# Patient Record
Sex: FEMALE | Race: WHITE | NOT HISPANIC OR LATINO | Employment: FULL TIME | URBAN - METROPOLITAN AREA
[De-identification: names, ages, dates, MRNs, and addresses within clinical notes are randomized per-mention and may not be internally consistent; named-entity substitution may affect disease eponyms.]

---

## 2021-04-15 ENCOUNTER — PATIENT MESSAGE (OUTPATIENT)
Dept: RESEARCH | Facility: HOSPITAL | Age: 41
End: 2021-04-15

## 2023-05-17 ENCOUNTER — HOSPITAL ENCOUNTER (INPATIENT)
Facility: HOSPITAL | Age: 43
LOS: 1 days | Discharge: HOME | DRG: 355 | End: 2023-05-19
Attending: EMERGENCY MEDICINE | Admitting: SURGERY
Payer: COMMERCIAL

## 2023-05-17 DIAGNOSIS — K45.8 INTERNAL HERNIA: Primary | ICD-10-CM

## 2023-05-17 PROCEDURE — 99285 PR EMERGENCY DEPT VISIT,LEVEL V: ICD-10-PCS | Mod: ,,, | Performed by: EMERGENCY MEDICINE

## 2023-05-17 PROCEDURE — 99285 EMERGENCY DEPT VISIT HI MDM: CPT | Mod: ,,, | Performed by: EMERGENCY MEDICINE

## 2023-05-17 PROCEDURE — 99285 EMERGENCY DEPT VISIT HI MDM: CPT

## 2023-05-18 ENCOUNTER — ANESTHESIA EVENT (OUTPATIENT)
Dept: SURGERY | Facility: HOSPITAL | Age: 43
DRG: 355 | End: 2023-05-18
Payer: COMMERCIAL

## 2023-05-18 ENCOUNTER — ANESTHESIA (OUTPATIENT)
Dept: SURGERY | Facility: HOSPITAL | Age: 43
DRG: 355 | End: 2023-05-18
Payer: COMMERCIAL

## 2023-05-18 LAB
ABO + RH BLD: NORMAL
ALBUMIN SERPL BCP-MCNC: 3.3 G/DL (ref 3.5–5.2)
ALBUMIN SERPL BCP-MCNC: 3.5 G/DL (ref 3.5–5.2)
ALP SERPL-CCNC: 45 U/L (ref 55–135)
ALP SERPL-CCNC: 49 U/L (ref 55–135)
ALT SERPL W/O P-5'-P-CCNC: 14 U/L (ref 10–44)
ALT SERPL W/O P-5'-P-CCNC: 15 U/L (ref 10–44)
ANION GAP SERPL CALC-SCNC: 11 MMOL/L (ref 8–16)
ANION GAP SERPL CALC-SCNC: 8 MMOL/L (ref 8–16)
AST SERPL-CCNC: 20 U/L (ref 10–40)
AST SERPL-CCNC: 21 U/L (ref 10–40)
B-HCG UR QL: NEGATIVE
BASOPHILS # BLD AUTO: 0.05 K/UL (ref 0–0.2)
BASOPHILS # BLD AUTO: 0.06 K/UL (ref 0–0.2)
BASOPHILS NFR BLD: 0.9 % (ref 0–1.9)
BASOPHILS NFR BLD: 1 % (ref 0–1.9)
BILIRUB SERPL-MCNC: 0.4 MG/DL (ref 0.1–1)
BILIRUB SERPL-MCNC: 0.5 MG/DL (ref 0.1–1)
BLD GP AB SCN CELLS X3 SERPL QL: NORMAL
BUN SERPL-MCNC: 14 MG/DL (ref 6–20)
BUN SERPL-MCNC: 14 MG/DL (ref 6–20)
CALCIUM SERPL-MCNC: 8.2 MG/DL (ref 8.7–10.5)
CALCIUM SERPL-MCNC: 8.2 MG/DL (ref 8.7–10.5)
CHLORIDE SERPL-SCNC: 107 MMOL/L (ref 95–110)
CHLORIDE SERPL-SCNC: 107 MMOL/L (ref 95–110)
CO2 SERPL-SCNC: 23 MMOL/L (ref 23–29)
CO2 SERPL-SCNC: 25 MMOL/L (ref 23–29)
CREAT SERPL-MCNC: 0.7 MG/DL (ref 0.5–1.4)
CREAT SERPL-MCNC: 0.7 MG/DL (ref 0.5–1.4)
CTP QC/QA: YES
DIFFERENTIAL METHOD: ABNORMAL
DIFFERENTIAL METHOD: ABNORMAL
EOSINOPHIL # BLD AUTO: 0.1 K/UL (ref 0–0.5)
EOSINOPHIL # BLD AUTO: 0.1 K/UL (ref 0–0.5)
EOSINOPHIL NFR BLD: 1 % (ref 0–8)
EOSINOPHIL NFR BLD: 2.2 % (ref 0–8)
ERYTHROCYTE [DISTWIDTH] IN BLOOD BY AUTOMATED COUNT: 18.6 % (ref 11.5–14.5)
ERYTHROCYTE [DISTWIDTH] IN BLOOD BY AUTOMATED COUNT: 18.6 % (ref 11.5–14.5)
EST. GFR  (NO RACE VARIABLE): >60 ML/MIN/1.73 M^2
EST. GFR  (NO RACE VARIABLE): >60 ML/MIN/1.73 M^2
GLUCOSE SERPL-MCNC: 83 MG/DL (ref 70–110)
GLUCOSE SERPL-MCNC: 89 MG/DL (ref 70–110)
HCT VFR BLD AUTO: 30.5 % (ref 37–48.5)
HCT VFR BLD AUTO: 30.7 % (ref 37–48.5)
HCV AB SERPL QL IA: NORMAL
HGB BLD-MCNC: 8.6 G/DL (ref 12–16)
HGB BLD-MCNC: 9 G/DL (ref 12–16)
HIV 1+2 AB+HIV1 P24 AG SERPL QL IA: NORMAL
IMM GRANULOCYTES # BLD AUTO: 0.01 K/UL (ref 0–0.04)
IMM GRANULOCYTES # BLD AUTO: 0.02 K/UL (ref 0–0.04)
IMM GRANULOCYTES NFR BLD AUTO: 0.2 % (ref 0–0.5)
IMM GRANULOCYTES NFR BLD AUTO: 0.3 % (ref 0–0.5)
LACTATE SERPL-SCNC: 0.6 MMOL/L (ref 0.5–2.2)
LYMPHOCYTES # BLD AUTO: 1.7 K/UL (ref 1–4.8)
LYMPHOCYTES # BLD AUTO: 1.8 K/UL (ref 1–4.8)
LYMPHOCYTES NFR BLD: 29 % (ref 18–48)
LYMPHOCYTES NFR BLD: 32.5 % (ref 18–48)
MAGNESIUM SERPL-MCNC: 2 MG/DL (ref 1.6–2.6)
MCH RBC QN AUTO: 21.6 PG (ref 27–31)
MCH RBC QN AUTO: 22.4 PG (ref 27–31)
MCHC RBC AUTO-ENTMCNC: 28.2 G/DL (ref 32–36)
MCHC RBC AUTO-ENTMCNC: 29.3 G/DL (ref 32–36)
MCV RBC AUTO: 76 FL (ref 82–98)
MCV RBC AUTO: 77 FL (ref 82–98)
MONOCYTES # BLD AUTO: 0.5 K/UL (ref 0.3–1)
MONOCYTES # BLD AUTO: 0.6 K/UL (ref 0.3–1)
MONOCYTES NFR BLD: 11.6 % (ref 4–15)
MONOCYTES NFR BLD: 8.6 % (ref 4–15)
NEUTROPHILS # BLD AUTO: 2.9 K/UL (ref 1.8–7.7)
NEUTROPHILS # BLD AUTO: 3.5 K/UL (ref 1.8–7.7)
NEUTROPHILS NFR BLD: 52.6 % (ref 38–73)
NEUTROPHILS NFR BLD: 60.1 % (ref 38–73)
NRBC BLD-RTO: 0 /100 WBC
NRBC BLD-RTO: 0 /100 WBC
PHOSPHATE SERPL-MCNC: 4.4 MG/DL (ref 2.7–4.5)
PLATELET # BLD AUTO: 321 K/UL (ref 150–450)
PLATELET # BLD AUTO: 322 K/UL (ref 150–450)
PMV BLD AUTO: 10.1 FL (ref 9.2–12.9)
PMV BLD AUTO: 9.6 FL (ref 9.2–12.9)
POTASSIUM SERPL-SCNC: 3.4 MMOL/L (ref 3.5–5.1)
POTASSIUM SERPL-SCNC: 3.7 MMOL/L (ref 3.5–5.1)
PROT SERPL-MCNC: 5.8 G/DL (ref 6–8.4)
PROT SERPL-MCNC: 6.1 G/DL (ref 6–8.4)
RBC # BLD AUTO: 3.98 M/UL (ref 4–5.4)
RBC # BLD AUTO: 4.02 M/UL (ref 4–5.4)
SODIUM SERPL-SCNC: 140 MMOL/L (ref 136–145)
SODIUM SERPL-SCNC: 141 MMOL/L (ref 136–145)
WBC # BLD AUTO: 5.53 K/UL (ref 3.9–12.7)
WBC # BLD AUTO: 5.9 K/UL (ref 3.9–12.7)

## 2023-05-18 PROCEDURE — 63600175 PHARM REV CODE 636 W HCPCS: Performed by: NURSE ANESTHETIST, CERTIFIED REGISTERED

## 2023-05-18 PROCEDURE — 63600175 PHARM REV CODE 636 W HCPCS

## 2023-05-18 PROCEDURE — D9220A PRA ANESTHESIA: ICD-10-PCS | Mod: CRNA,,, | Performed by: NURSE ANESTHETIST, CERTIFIED REGISTERED

## 2023-05-18 PROCEDURE — 63600175 PHARM REV CODE 636 W HCPCS: Performed by: STUDENT IN AN ORGANIZED HEALTH CARE EDUCATION/TRAINING PROGRAM

## 2023-05-18 PROCEDURE — 71000015 HC POSTOP RECOV 1ST HR: Performed by: SURGERY

## 2023-05-18 PROCEDURE — 71000033 HC RECOVERY, INTIAL HOUR: Performed by: SURGERY

## 2023-05-18 PROCEDURE — C9113 INJ PANTOPRAZOLE SODIUM, VIA: HCPCS | Performed by: STUDENT IN AN ORGANIZED HEALTH CARE EDUCATION/TRAINING PROGRAM

## 2023-05-18 PROCEDURE — 86900 BLOOD TYPING SEROLOGIC ABO: CPT | Performed by: ANESTHESIOLOGY

## 2023-05-18 PROCEDURE — D9220A PRA ANESTHESIA: ICD-10-PCS | Mod: ANES,,, | Performed by: ANESTHESIOLOGY

## 2023-05-18 PROCEDURE — 84100 ASSAY OF PHOSPHORUS: CPT | Performed by: STUDENT IN AN ORGANIZED HEALTH CARE EDUCATION/TRAINING PROGRAM

## 2023-05-18 PROCEDURE — 44238 UNLISTED LAPS PX INTESTINE: CPT | Mod: ,,, | Performed by: SURGERY

## 2023-05-18 PROCEDURE — 87389 HIV-1 AG W/HIV-1&-2 AB AG IA: CPT | Performed by: PHYSICIAN ASSISTANT

## 2023-05-18 PROCEDURE — 44238 PR LAPAROSCOPY; REDUCT/REPAIR  INTERNAL HERNIA/VOLVULUS/DEFECT: ICD-10-PCS | Mod: ,,, | Performed by: SURGERY

## 2023-05-18 PROCEDURE — 37000009 HC ANESTHESIA EA ADD 15 MINS: Performed by: SURGERY

## 2023-05-18 PROCEDURE — 83735 ASSAY OF MAGNESIUM: CPT | Performed by: STUDENT IN AN ORGANIZED HEALTH CARE EDUCATION/TRAINING PROGRAM

## 2023-05-18 PROCEDURE — 81025 URINE PREGNANCY TEST: CPT | Performed by: EMERGENCY MEDICINE

## 2023-05-18 PROCEDURE — 85025 COMPLETE CBC W/AUTO DIFF WBC: CPT | Performed by: STUDENT IN AN ORGANIZED HEALTH CARE EDUCATION/TRAINING PROGRAM

## 2023-05-18 PROCEDURE — 25000003 PHARM REV CODE 250: Performed by: STUDENT IN AN ORGANIZED HEALTH CARE EDUCATION/TRAINING PROGRAM

## 2023-05-18 PROCEDURE — D9220A PRA ANESTHESIA: Mod: CRNA,,, | Performed by: NURSE ANESTHETIST, CERTIFIED REGISTERED

## 2023-05-18 PROCEDURE — 71000016 HC POSTOP RECOV ADDL HR: Performed by: SURGERY

## 2023-05-18 PROCEDURE — 83605 ASSAY OF LACTIC ACID: CPT | Performed by: EMERGENCY MEDICINE

## 2023-05-18 PROCEDURE — 36000709 HC OR TIME LEV III EA ADD 15 MIN: Performed by: SURGERY

## 2023-05-18 PROCEDURE — 25000003 PHARM REV CODE 250: Performed by: SURGERY

## 2023-05-18 PROCEDURE — 36000708 HC OR TIME LEV III 1ST 15 MIN: Performed by: SURGERY

## 2023-05-18 PROCEDURE — 86803 HEPATITIS C AB TEST: CPT | Performed by: PHYSICIAN ASSISTANT

## 2023-05-18 PROCEDURE — 85025 COMPLETE CBC W/AUTO DIFF WBC: CPT | Mod: 91 | Performed by: EMERGENCY MEDICINE

## 2023-05-18 PROCEDURE — 25000003 PHARM REV CODE 250: Performed by: NURSE ANESTHETIST, CERTIFIED REGISTERED

## 2023-05-18 PROCEDURE — 63600175 PHARM REV CODE 636 W HCPCS: Performed by: ANESTHESIOLOGY

## 2023-05-18 PROCEDURE — 94761 N-INVAS EAR/PLS OXIMETRY MLT: CPT

## 2023-05-18 PROCEDURE — 80053 COMPREHEN METABOLIC PANEL: CPT | Mod: 91 | Performed by: EMERGENCY MEDICINE

## 2023-05-18 PROCEDURE — 27201423 OPTIME MED/SURG SUP & DEVICES STERILE SUPPLY: Performed by: SURGERY

## 2023-05-18 PROCEDURE — D9220A PRA ANESTHESIA: Mod: ANES,,, | Performed by: ANESTHESIOLOGY

## 2023-05-18 PROCEDURE — 37000008 HC ANESTHESIA 1ST 15 MINUTES: Performed by: SURGERY

## 2023-05-18 PROCEDURE — 80053 COMPREHEN METABOLIC PANEL: CPT | Performed by: STUDENT IN AN ORGANIZED HEALTH CARE EDUCATION/TRAINING PROGRAM

## 2023-05-18 PROCEDURE — 21400001 HC TELEMETRY ROOM

## 2023-05-18 RX ORDER — PANTOPRAZOLE SODIUM 40 MG/10ML
40 INJECTION, POWDER, LYOPHILIZED, FOR SOLUTION INTRAVENOUS DAILY
Status: DISCONTINUED | OUTPATIENT
Start: 2023-05-18 | End: 2023-05-19 | Stop reason: HOSPADM

## 2023-05-18 RX ORDER — ENOXAPARIN SODIUM 100 MG/ML
40 INJECTION SUBCUTANEOUS EVERY 24 HOURS
Status: DISCONTINUED | OUTPATIENT
Start: 2023-05-18 | End: 2023-05-19 | Stop reason: HOSPADM

## 2023-05-18 RX ORDER — SODIUM CHLORIDE, SODIUM LACTATE, POTASSIUM CHLORIDE, CALCIUM CHLORIDE 600; 310; 30; 20 MG/100ML; MG/100ML; MG/100ML; MG/100ML
INJECTION, SOLUTION INTRAVENOUS CONTINUOUS
Status: DISCONTINUED | OUTPATIENT
Start: 2023-05-18 | End: 2023-05-19

## 2023-05-18 RX ORDER — ACETAMINOPHEN 10 MG/ML
INJECTION, SOLUTION INTRAVENOUS
Status: DISCONTINUED | OUTPATIENT
Start: 2023-05-18 | End: 2023-05-18

## 2023-05-18 RX ORDER — OXYCODONE HYDROCHLORIDE 5 MG/1
5 TABLET ORAL EVERY 4 HOURS PRN
Status: DISCONTINUED | OUTPATIENT
Start: 2023-05-18 | End: 2023-05-19 | Stop reason: HOSPADM

## 2023-05-18 RX ORDER — HYDROMORPHONE HYDROCHLORIDE 1 MG/ML
0.2 INJECTION, SOLUTION INTRAMUSCULAR; INTRAVENOUS; SUBCUTANEOUS EVERY 5 MIN PRN
Status: DISCONTINUED | OUTPATIENT
Start: 2023-05-18 | End: 2023-05-18 | Stop reason: HOSPADM

## 2023-05-18 RX ORDER — ACETAMINOPHEN 10 MG/ML
1000 INJECTION, SOLUTION INTRAVENOUS EVERY 8 HOURS
Status: DISCONTINUED | OUTPATIENT
Start: 2023-05-18 | End: 2023-05-19 | Stop reason: HOSPADM

## 2023-05-18 RX ORDER — HYDROMORPHONE HYDROCHLORIDE 1 MG/ML
INJECTION, SOLUTION INTRAMUSCULAR; INTRAVENOUS; SUBCUTANEOUS
Status: COMPLETED
Start: 2023-05-18 | End: 2023-05-18

## 2023-05-18 RX ORDER — HYDROMORPHONE HYDROCHLORIDE 1 MG/ML
0.5 INJECTION, SOLUTION INTRAMUSCULAR; INTRAVENOUS; SUBCUTANEOUS EVERY 4 HOURS PRN
Status: DISCONTINUED | OUTPATIENT
Start: 2023-05-18 | End: 2023-05-18

## 2023-05-18 RX ORDER — LIDOCAINE HYDROCHLORIDE 10 MG/ML
1 INJECTION, SOLUTION EPIDURAL; INFILTRATION; INTRACAUDAL; PERINEURAL ONCE AS NEEDED
Status: DISCONTINUED | OUTPATIENT
Start: 2023-05-18 | End: 2023-05-19 | Stop reason: HOSPADM

## 2023-05-18 RX ORDER — SUCCINYLCHOLINE CHLORIDE 20 MG/ML
INJECTION INTRAMUSCULAR; INTRAVENOUS
Status: DISCONTINUED | OUTPATIENT
Start: 2023-05-18 | End: 2023-05-18

## 2023-05-18 RX ORDER — DEXAMETHASONE SODIUM PHOSPHATE 4 MG/ML
INJECTION, SOLUTION INTRA-ARTICULAR; INTRALESIONAL; INTRAMUSCULAR; INTRAVENOUS; SOFT TISSUE
Status: DISCONTINUED | OUTPATIENT
Start: 2023-05-18 | End: 2023-05-18

## 2023-05-18 RX ORDER — HALOPERIDOL 5 MG/ML
INJECTION INTRAMUSCULAR
Status: DISCONTINUED | OUTPATIENT
Start: 2023-05-18 | End: 2023-05-18

## 2023-05-18 RX ORDER — SODIUM CHLORIDE 0.9 % (FLUSH) 0.9 %
10 SYRINGE (ML) INJECTION
Status: DISCONTINUED | OUTPATIENT
Start: 2023-05-18 | End: 2023-05-18 | Stop reason: HOSPADM

## 2023-05-18 RX ORDER — TRAMADOL HYDROCHLORIDE 50 MG/1
50 TABLET ORAL EVERY 4 HOURS PRN
Status: DISCONTINUED | OUTPATIENT
Start: 2023-05-18 | End: 2023-05-19 | Stop reason: HOSPADM

## 2023-05-18 RX ORDER — PROPOFOL 10 MG/ML
VIAL (ML) INTRAVENOUS
Status: DISCONTINUED | OUTPATIENT
Start: 2023-05-18 | End: 2023-05-18

## 2023-05-18 RX ORDER — BUPIVACAINE HYDROCHLORIDE 2.5 MG/ML
INJECTION, SOLUTION EPIDURAL; INFILTRATION; INTRACAUDAL
Status: DISCONTINUED | OUTPATIENT
Start: 2023-05-18 | End: 2023-05-18 | Stop reason: HOSPADM

## 2023-05-18 RX ORDER — LIDOCAINE HYDROCHLORIDE 20 MG/ML
INJECTION, SOLUTION EPIDURAL; INFILTRATION; INTRACAUDAL; PERINEURAL
Status: DISCONTINUED | OUTPATIENT
Start: 2023-05-18 | End: 2023-05-18

## 2023-05-18 RX ORDER — DEXMEDETOMIDINE HYDROCHLORIDE 100 UG/ML
INJECTION, SOLUTION INTRAVENOUS
Status: DISCONTINUED | OUTPATIENT
Start: 2023-05-18 | End: 2023-05-18

## 2023-05-18 RX ORDER — ONDANSETRON 2 MG/ML
4 INJECTION INTRAMUSCULAR; INTRAVENOUS EVERY 12 HOURS PRN
Status: DISCONTINUED | OUTPATIENT
Start: 2023-05-18 | End: 2023-05-19 | Stop reason: HOSPADM

## 2023-05-18 RX ORDER — ONDANSETRON 2 MG/ML
INJECTION INTRAMUSCULAR; INTRAVENOUS
Status: DISCONTINUED | OUTPATIENT
Start: 2023-05-18 | End: 2023-05-18

## 2023-05-18 RX ORDER — ROCURONIUM BROMIDE 10 MG/ML
INJECTION, SOLUTION INTRAVENOUS
Status: DISCONTINUED | OUTPATIENT
Start: 2023-05-18 | End: 2023-05-18

## 2023-05-18 RX ORDER — QUETIAPINE FUMARATE 25 MG/1
50 TABLET, FILM COATED ORAL NIGHTLY PRN
COMMUNITY
End: 2023-10-03

## 2023-05-18 RX ORDER — FAMOTIDINE 10 MG/ML
INJECTION INTRAVENOUS
Status: DISCONTINUED | OUTPATIENT
Start: 2023-05-18 | End: 2023-05-18

## 2023-05-18 RX ORDER — CEFAZOLIN SODIUM 1 G/3ML
INJECTION, POWDER, FOR SOLUTION INTRAMUSCULAR; INTRAVENOUS
Status: DISCONTINUED | OUTPATIENT
Start: 2023-05-18 | End: 2023-05-18

## 2023-05-18 RX ORDER — MIDAZOLAM HYDROCHLORIDE 1 MG/ML
INJECTION, SOLUTION INTRAMUSCULAR; INTRAVENOUS
Status: DISCONTINUED | OUTPATIENT
Start: 2023-05-18 | End: 2023-05-18

## 2023-05-18 RX ORDER — QUETIAPINE FUMARATE 25 MG/1
50 TABLET, FILM COATED ORAL NIGHTLY PRN
Status: DISCONTINUED | OUTPATIENT
Start: 2023-05-18 | End: 2023-05-19 | Stop reason: HOSPADM

## 2023-05-18 RX ORDER — OXYCODONE HYDROCHLORIDE 10 MG/1
10 TABLET ORAL EVERY 4 HOURS PRN
Status: DISCONTINUED | OUTPATIENT
Start: 2023-05-18 | End: 2023-05-19 | Stop reason: HOSPADM

## 2023-05-18 RX ORDER — FENTANYL CITRATE 50 UG/ML
INJECTION, SOLUTION INTRAMUSCULAR; INTRAVENOUS
Status: DISCONTINUED | OUTPATIENT
Start: 2023-05-18 | End: 2023-05-18

## 2023-05-18 RX ORDER — HALOPERIDOL 5 MG/ML
0.5 INJECTION INTRAMUSCULAR EVERY 10 MIN PRN
Status: DISCONTINUED | OUTPATIENT
Start: 2023-05-18 | End: 2023-05-18 | Stop reason: HOSPADM

## 2023-05-18 RX ORDER — SODIUM CHLORIDE 0.9 % (FLUSH) 0.9 %
10 SYRINGE (ML) INJECTION
Status: DISCONTINUED | OUTPATIENT
Start: 2023-05-18 | End: 2023-05-19 | Stop reason: HOSPADM

## 2023-05-18 RX ORDER — ONDANSETRON 2 MG/ML
4 INJECTION INTRAMUSCULAR; INTRAVENOUS DAILY PRN
Status: DISCONTINUED | OUTPATIENT
Start: 2023-05-18 | End: 2023-05-18 | Stop reason: HOSPADM

## 2023-05-18 RX ORDER — SODIUM CHLORIDE, SODIUM LACTATE, POTASSIUM CHLORIDE, CALCIUM CHLORIDE 600; 310; 30; 20 MG/100ML; MG/100ML; MG/100ML; MG/100ML
INJECTION, SOLUTION INTRAVENOUS CONTINUOUS
Status: DISCONTINUED | OUTPATIENT
Start: 2023-05-18 | End: 2023-05-18

## 2023-05-18 RX ORDER — POLYETHYLENE GLYCOL 3350 17 G/17G
17 POWDER, FOR SOLUTION ORAL DAILY
Status: DISCONTINUED | OUTPATIENT
Start: 2023-05-18 | End: 2023-05-19 | Stop reason: HOSPADM

## 2023-05-18 RX ADMIN — FENTANYL CITRATE 50 MCG: 50 INJECTION, SOLUTION INTRAMUSCULAR; INTRAVENOUS at 11:05

## 2023-05-18 RX ADMIN — ACETAMINOPHEN 1000 MG: 10 INJECTION INTRAVENOUS at 09:05

## 2023-05-18 RX ADMIN — DEXMEDETOMIDINE 8 MCG: 100 INJECTION, SOLUTION, CONCENTRATE INTRAVENOUS at 11:05

## 2023-05-18 RX ADMIN — OXYCODONE HYDROCHLORIDE 10 MG: 10 TABLET ORAL at 09:05

## 2023-05-18 RX ADMIN — PROPOFOL 200 MG: 10 INJECTION, EMULSION INTRAVENOUS at 10:05

## 2023-05-18 RX ADMIN — HALOPERIDOL LACTATE 0.5 MG: 5 INJECTION, SOLUTION INTRAMUSCULAR at 11:05

## 2023-05-18 RX ADMIN — POLYETHYLENE GLYCOL 3350 17 G: 17 POWDER, FOR SOLUTION ORAL at 04:05

## 2023-05-18 RX ADMIN — LIDOCAINE HYDROCHLORIDE 100 MG: 20 INJECTION, SOLUTION EPIDURAL; INFILTRATION; INTRACAUDAL at 10:05

## 2023-05-18 RX ADMIN — ONDANSETRON 4 MG: 2 INJECTION INTRAMUSCULAR; INTRAVENOUS at 10:05

## 2023-05-18 RX ADMIN — ROCURONIUM BROMIDE 25 MG: 10 INJECTION, SOLUTION INTRAVENOUS at 10:05

## 2023-05-18 RX ADMIN — METHOCARBAMOL 500 MG: 100 INJECTION INTRAMUSCULAR; INTRAVENOUS at 10:05

## 2023-05-18 RX ADMIN — HYDROMORPHONE HYDROCHLORIDE 0.2 MG: 1 INJECTION, SOLUTION INTRAMUSCULAR; INTRAVENOUS; SUBCUTANEOUS at 12:05

## 2023-05-18 RX ADMIN — OXYCODONE 5 MG: 5 TABLET ORAL at 04:05

## 2023-05-18 RX ADMIN — CEFAZOLIN 2 G: 330 INJECTION, POWDER, FOR SOLUTION INTRAMUSCULAR; INTRAVENOUS at 10:05

## 2023-05-18 RX ADMIN — ROCURONIUM BROMIDE 50 MG: 10 INJECTION, SOLUTION INTRAVENOUS at 10:05

## 2023-05-18 RX ADMIN — PIPERACILLIN AND TAZOBACTAM 4.5 G: 4; .5 INJECTION, POWDER, LYOPHILIZED, FOR SOLUTION INTRAVENOUS; PARENTERAL at 04:05

## 2023-05-18 RX ADMIN — SODIUM CHLORIDE, POTASSIUM CHLORIDE, SODIUM LACTATE AND CALCIUM CHLORIDE: 600; 310; 30; 20 INJECTION, SOLUTION INTRAVENOUS at 12:05

## 2023-05-18 RX ADMIN — FENTANYL CITRATE 50 MCG: 50 INJECTION, SOLUTION INTRAMUSCULAR; INTRAVENOUS at 09:05

## 2023-05-18 RX ADMIN — SODIUM CHLORIDE, POTASSIUM CHLORIDE, SODIUM LACTATE AND CALCIUM CHLORIDE: 600; 310; 30; 20 INJECTION, SOLUTION INTRAVENOUS at 02:05

## 2023-05-18 RX ADMIN — FAMOTIDINE 20 MG: 10 INJECTION, SOLUTION INTRAVENOUS at 10:05

## 2023-05-18 RX ADMIN — FENTANYL CITRATE 50 MCG: 50 INJECTION, SOLUTION INTRAMUSCULAR; INTRAVENOUS at 10:05

## 2023-05-18 RX ADMIN — HYDROMORPHONE HYDROCHLORIDE 0.5 MG: 1 INJECTION, SOLUTION INTRAMUSCULAR; INTRAVENOUS; SUBCUTANEOUS at 04:05

## 2023-05-18 RX ADMIN — SODIUM CHLORIDE, SODIUM GLUCONATE, SODIUM ACETATE, POTASSIUM CHLORIDE, MAGNESIUM CHLORIDE, SODIUM PHOSPHATE, DIBASIC, AND POTASSIUM PHOSPHATE: .53; .5; .37; .037; .03; .012; .00082 INJECTION, SOLUTION INTRAVENOUS at 09:05

## 2023-05-18 RX ADMIN — ACETAMINOPHEN 1000 MG: 10 INJECTION INTRAVENOUS at 10:05

## 2023-05-18 RX ADMIN — SUCCINYLCHOLINE 120 MG: 20 INJECTION, SOLUTION INTRAMUSCULAR; INTRAVENOUS at 10:05

## 2023-05-18 RX ADMIN — PANTOPRAZOLE SODIUM 40 MG: 40 INJECTION, POWDER, FOR SOLUTION INTRAVENOUS at 04:05

## 2023-05-18 RX ADMIN — SUGAMMADEX 200 MG: 100 INJECTION, SOLUTION INTRAVENOUS at 11:05

## 2023-05-18 RX ADMIN — MIDAZOLAM HYDROCHLORIDE 2 MG: 1 INJECTION, SOLUTION INTRAMUSCULAR; INTRAVENOUS at 09:05

## 2023-05-18 RX ADMIN — DEXAMETHASONE SODIUM PHOSPHATE 8 MG: 4 INJECTION INTRA-ARTICULAR; INTRALESIONAL; INTRAMUSCULAR; INTRAVENOUS; SOFT TISSUE at 10:05

## 2023-05-18 RX ADMIN — ENOXAPARIN SODIUM 40 MG: 40 INJECTION SUBCUTANEOUS at 04:05

## 2023-05-18 NOTE — ANESTHESIA POSTPROCEDURE EVALUATION
Anesthesia Post Evaluation    Patient: Sarah Padron    Procedure(s) Performed: Procedure(s) (LRB):  LAPAROSCOPY, DIAGNOSTIC (N/A)    Final Anesthesia Type: general      Patient location during evaluation: PACU  Patient participation: Yes- Able to Participate  Level of consciousness: awake and alert  Post-procedure vital signs: reviewed and stable  Pain management: adequate  Airway patency: patent    PONV status at discharge: No PONV  Anesthetic complications: no      Cardiovascular status: blood pressure returned to baseline  Respiratory status: unassisted  Hydration status: euvolemic  Follow-up not needed.          Vitals Value Taken Time   BP 94/51 05/18/23 1317   Temp 36.6 °C (97.8 °F) 05/18/23 1300   Pulse 66 05/18/23 1320   Resp 13 05/18/23 1320   SpO2 100 % 05/18/23 1320   Vitals shown include unvalidated device data.      Event Time   Out of Recovery 05/18/2023 12:30:00         Pain/Wesley Score: Pain Rating Prior to Med Admin: 6 (5/18/2023 12:49 PM)  Pain Rating Post Med Admin: 9 (headache) (5/18/2023  9:22 AM)  Wesley Score: 10 (5/18/2023 12:30 PM)

## 2023-05-18 NOTE — ASSESSMENT & PLAN NOTE
43 y.o. female with PMHx of obesity s/p lap band in 2008, lap band removal with conversion to bypass in 2014, ex lap with reduction of internal hernia in 2016 who presents as transfer for evaluation of an internal hernia.     - Plan for OR today for diag lap, possible ex lap; consent obtained (in folder)  - lactate normal  - NPO  - mIVF  - Pain meds prn  - Antiemetics prn  - Daily labs  - Please call with questions or concerns

## 2023-05-18 NOTE — HPI
Sarah Padron is a 43 y.o. female with PMHx of obesity s/p lap band in 2008, lap band removal with conversion to bypass in 2014, ex lap with reduction of internal hernia in 2016 who presents as transfer for evaluation of an internal hernia. 4 days ago she developed epigastric pain after eating eggplant parmesan. This pain got progressively worse prompting her visit to an outside ED yesterday. She notes nausea and dry heaving, but no vomiting. Her last BM was yesterday, denies blood in stool, diarrhea, constipation. No fevers at home. She states her pain has now almost completely resolved after receiving a dose of Morphine.   She is hemodynamically stable with HR 70s to 80s, sBP 110s to 120s. WBC within normal limits, labs unremarkable overall. CT A/P with evidence of internal hernia.

## 2023-05-18 NOTE — ED PROVIDER NOTES
Encounter Date: 5/17/2023       History     Chief Complaint   Patient presents with    Transfer      transfer for twisted internal hernia. Here to see bariatrics      Patient is a 43-year-old female with past medical history of cholecystectomy, gastric lap band, hernia repair who presents from [facility] as a transfer for further evaluation and treatment of an internal hernia.  Patient had a CT scan at the outside hospital showing interval development of whirl sign at the root of the mesentery.  She reports her pain has improved since her arrival to  and is currently 2/10.     The history is provided by the patient and medical records.   Review of patient's allergies indicates:   Allergen Reactions    Dilaudid [hydromorphone] Itching     History reviewed. No pertinent past medical history.  Past Surgical History:   Procedure Laterality Date    ABDOMINAL SURGERY      BREAST SURGERY      lift    CHOLECYSTECTOMY      DIAGNOSTIC LAPAROSCOPY N/A 5/18/2023    Procedure: LAPAROSCOPY, DIAGNOSTIC;  Surgeon: Davy Barnes MD;  Location: [facility] OR [address] [place] [address];  Service: General;  Laterality: N/A;  REPAIR INTERNAL HERNIA    EYE SURGERY Right     cataract    GASTRIC BYPASS      HERNIA REPAIR N/A 2016    internal    lap band removal  7/9/14    LAPAROSCOPIC GASTRIC BANDING      LAPAROSCOPIC GASTRIC BANDING  2008    revision     Family History   Problem Relation Age of Onset    Hypertension Mother     Hypertension Father     Obesity Father      Social History     Tobacco Use    Smoking status: Never    Smokeless tobacco: Never   Substance Use Topics    Alcohol use: Yes     Comment: ocassional; wine    Drug use: No         Physical Exam     Initial Vitals [05/17/23 2313]   BP Pulse Resp Temp SpO2   122/68 96 16 98.2 °F (36.8 °C) 98 %      MAP       --         Physical Exam    Nursing note and vitals reviewed.  Constitutional: She appears well-developed and well-nourished. She is not diaphoretic. No distress.    HENT:   Head: Normocephalic and atraumatic.   Eyes: Conjunctivae and EOM are normal.   Neck: Neck supple.   Normal range of motion.  Cardiovascular:  Normal rate and regular rhythm.           Pulmonary/Chest: No respiratory distress.   Abdominal: She exhibits no distension. There is abdominal tenderness (mild to moderate). There is no rebound and no guarding.   Musculoskeletal:      Cervical back: Normal range of motion and neck supple.     Neurological: She is alert and oriented to person, place, and time. GCS score is 15. GCS eye subscore is 4. GCS verbal subscore is 5. GCS motor subscore is 6.   Skin: Skin is warm and dry.   Psychiatric: She has a normal mood and affect. Her behavior is normal. Judgment and thought content normal.       ED Course   Procedures  Labs Reviewed   COMPREHENSIVE METABOLIC PANEL - Abnormal; Notable for the following components:       Result Value    Potassium 3.4 (*)     Calcium 8.2 (*)     Alkaline Phosphatase 49 (*)     All other components within normal limits   CBC W/ AUTO DIFFERENTIAL - Abnormal; Notable for the following components:    Hemoglobin 9.0 (*)     Hematocrit 30.7 (*)     MCV 76 (*)     MCH 22.4 (*)     MCHC 29.3 (*)     RDW 18.6 (*)     All other components within normal limits   CBC W/ AUTO DIFFERENTIAL - Abnormal; Notable for the following components:    RBC 3.98 (*)     Hemoglobin 8.6 (*)     Hematocrit 30.5 (*)     MCV 77 (*)     MCH 21.6 (*)     MCHC 28.2 (*)     RDW 18.6 (*)     All other components within normal limits   COMPREHENSIVE METABOLIC PANEL - Abnormal; Notable for the following components:    Calcium 8.2 (*)     Total Protein 5.8 (*)     Albumin 3.3 (*)     Alkaline Phosphatase 45 (*)     All other components within normal limits   HIV 1 / 2 ANTIBODY    Narrative:     Release to patient->Immediate   HEPATITIS C ANTIBODY    Narrative:     Release to patient->Immediate   LACTIC ACID, PLASMA   MAGNESIUM   PHOSPHORUS   POCT URINE PREGNANCY           Imaging Results    None          Medications - No data to display  Medical Decision Making:   History:   Old Medical Records: I decided to obtain old medical records.  Old Records Summarized: records from clinic visits and records from previous admission(s).  Differential Diagnosis:   Internal hernia, SBO, abscess  Clinical Tests:   Lab Tests: Reviewed and Ordered  Radiological Study: Reviewed  Other:   I have discussed this case with another health care provider.       <> Summary of the Discussion: General surgery was consulted for internal hernia and pt was evaluated in the ED by surgery and admitted to the surgical service                        Clinical Impression:   Final diagnoses:  [K45.8] Internal hernia (Primary)        ED Disposition Condition    Admit                 Elissa Motley MD  05/20/23 0759

## 2023-05-18 NOTE — BRIEF OP NOTE
Operative Note       Surgery Date: 5/18/2023     Surgeon(s) and Role:     * Davy Barnes MD - Primary     * Lidia Cruz MD - Resident - Assisting    Pre-op Diagnosis:  Internal hernia [K45.8]    Post-op Diagnosis:  Internal hernia [K45.8]    Procedure(s) (LRB):  LAPAROSCOPY, DIAGNOSTIC (N/A)    Anesthesia: General    Procedure in Detail/Findings:  Large internal hernia with relatively small defect under andre limb.  All bowel viable.  No j-j defect.    Estimated Blood Loss: Minimal           Specimens (From admission, onward)      None          Implants: * No implants in log *           Disposition: PACU - hemodynamically stable.           Condition: Good    Attestation:  I was present and scrubbed for the entire procedure.

## 2023-05-18 NOTE — SUBJECTIVE & OBJECTIVE
No current facility-administered medications on file prior to encounter.     Current Outpatient Medications on File Prior to Encounter   Medication Sig    amitriptyline (ELAVIL) 25 MG tablet Take 1 tablet (25 mg total) by mouth nightly as needed for Insomnia.    ergocalciferol (ERGOCALCIFEROL) 50,000 unit Cap Take 1 capsule (50,000 Units total) by mouth twice a week.    ergocalciferol (VITAMIN D2) 50,000 unit Cap Take 1 capsule (50,000 Units total) by mouth twice a week.    esomeprazole (NEXIUM PACKET) 40 mg GrPS Take 40 mg by mouth before breakfast. Open capsule and dissolve pellets in water    pediatric multivit-iron-min (FLINTSTONES COMPLETE, IRON,) Chew Take 1 tablet by mouth 2 (two) times daily.    promethazine (PHENERGAN) 25 MG tablet Take 0.5 tablets (12.5 mg total) by mouth every 6 (six) hours as needed for Nausea.       Review of patient's allergies indicates:   Allergen Reactions    Dilaudid [hydromorphone] Itching       No past medical history on file.  Past Surgical History:   Procedure Laterality Date    ABDOMINAL SURGERY      BREAST SURGERY      lift    CHOLECYSTECTOMY      EYE SURGERY Right     cataract    GASTRIC BYPASS      HERNIA REPAIR N/A 2016    internal    lap band removal  7/9/14    LAPAROSCOPIC GASTRIC BANDING      LAPAROSCOPIC GASTRIC BANDING  2008    revision     Family History       Problem Relation (Age of Onset)    Hypertension Mother, Father    Obesity Father          Tobacco Use    Smoking status: Never    Smokeless tobacco: Never   Substance and Sexual Activity    Alcohol use: Yes     Comment: ocassional; wine    Drug use: No    Sexual activity: Yes     Partners: Male     Review of Systems   Constitutional:  Negative for fever.   Respiratory:  Negative for shortness of breath.    Cardiovascular:  Negative for chest pain.   Gastrointestinal:  Positive for abdominal pain and nausea. Negative for blood in stool, constipation, diarrhea and vomiting.   Genitourinary:  Negative for  difficulty urinating.   Objective:     Vital Signs (Most Recent):  Temp: 98.2 °F (36.8 °C) (05/17/23 2313)  Pulse: 89 (05/17/23 2345)  Resp: 18 (05/17/23 2345)  BP: (!) 121/58 (05/17/23 2345)  SpO2: 99 % (05/17/23 2345) Vital Signs (24h Range):  Temp:  [98.2 °F (36.8 °C)] 98.2 °F (36.8 °C)  Pulse:  [89-96] 89  Resp:  [16-18] 18  SpO2:  [98 %-99 %] 99 %  BP: (121-122)/(58-68) 121/58     Weight: 68.9 kg (152 lb)  Body mass index is 26.93 kg/m².     Physical Exam  Vitals reviewed.   Constitutional:       General: She is not in acute distress.     Appearance: She is well-developed.   HENT:      Head: Normocephalic and atraumatic.   Eyes:      General:         Right eye: No discharge.         Left eye: No discharge.      Conjunctiva/sclera: Conjunctivae normal.   Cardiovascular:      Rate and Rhythm: Normal rate and regular rhythm.   Pulmonary:      Effort: Pulmonary effort is normal. No respiratory distress.   Abdominal:      General: There is no distension.      Palpations: Abdomen is soft.      Tenderness: There is no abdominal tenderness. There is no guarding or rebound.      Comments: Non tender to deep palpation in all four quadrants  Well-healed incision   Musculoskeletal:         General: No deformity. Normal range of motion.      Cervical back: Normal range of motion.   Skin:     General: Skin is warm and dry.   Neurological:      Mental Status: She is alert and oriented to person, place, and time.   Psychiatric:         Behavior: Behavior normal.          I have reviewed all pertinent lab results within the past 24 hours.  CBC:   Recent Labs   Lab 05/18/23  0035   WBC 5.90   RBC 4.02   HGB 9.0*   HCT 30.7*      MCV 76*   MCH 22.4*   MCHC 29.3*     CMP: No results for input(s): GLU, CALCIUM, ALBUMIN, PROT, NA, K, CO2, CL, BUN, CREATININE, ALKPHOS, ALT, AST, BILITOT in the last 168 hours.    Significant Diagnostics:  I have reviewed all pertinent imaging results/findings within the past 24 hours.    CT  A/P  INTERVAL DEVELOPMENT OF WHIRL SIGN AT THE ROOT OF THE MESENTERY, LIKELY SECONDARY TO INTERNAL HERNIA. THERE IS ASSOCIATED DELAYED OPACIFICATION OF THE MESENTERIC VENOUS BRANCHES AND SOFT TISSUE STRANDING OF THE SMALL BOWEL MESENTERY, CONSISTENT WITH SOME DEGREE OF ISCHEMIA.     PREVIOUS GASTRIC BYPASS AND CHOLECYSTECTOMY.     INTERVAL DEVELOPMENT OF RADIOGRAPHIC AREA OF DECREASED DENSITY IN THE POSTERIOR LATERAL SEGMENT OF THE LEFT HEPATIC LOBE, LIKELY REPRESENTING FOCAL AREA OF FATTY INFILTRATION. THIS MAY BE CONFIRMED WITH ULTRASOUND OF THE ABDOMEN ON A NONEMERGENT BASIS.     INCIDENTAL FINDING OF HEPATIC CYSTS.

## 2023-05-18 NOTE — TRANSFER OF CARE
"Anesthesia Transfer of Care Note    Patient: Sarah Padron    Procedure(s) Performed: Procedure(s) (LRB):  LAPAROSCOPY, DIAGNOSTIC (N/A)    Patient location: PACU    Anesthesia Type: general    Transport from OR: Transported from OR on room air with adequate spontaneous ventilation    Post pain: adequate analgesia    Post assessment: no apparent anesthetic complications and tolerated procedure well    Post vital signs: stable    Level of consciousness: awake, alert and oriented    Nausea/Vomiting: no nausea/vomiting    Complications: none    Transfer of care protocol was followed      Last vitals:   Visit Vitals  BP (!) 99/57   Pulse 105   Temp 36.4 °C (97.5 °F) (Temporal)   Resp 16   Ht 5' 3" (1.6 m)   Wt 68.9 kg (152 lb)   SpO2 (!) 94%   BMI 26.93 kg/m²     "

## 2023-05-18 NOTE — SUBJECTIVE & OBJECTIVE
Interval History: NARESH  Admitted from ED for internal hernia; plan for operative exploration this am, time pending staff, OR availability    Medications:  Continuous Infusions:   lactated ringers 125 mL/hr at 05/18/23 0202     Scheduled Meds:  PRN Meds:HYDROmorphone, LIDOcaine (PF) 10 mg/ml (1%), ondansetron, oxyCODONE, sodium chloride 0.9%     Review of patient's allergies indicates:   Allergen Reactions    Dilaudid [hydromorphone] Itching     Objective:     Vital Signs (Most Recent):  Temp: 97.8 °F (36.6 °C) (05/18/23 0503)  Pulse: 76 (05/18/23 0503)  Resp: 18 (05/18/23 0503)  BP: (!) 101/59 (05/18/23 0503)  SpO2: 97 % (05/18/23 0503) Vital Signs (24h Range):  Temp:  [97.8 °F (36.6 °C)-98.2 °F (36.8 °C)] 97.8 °F (36.6 °C)  Pulse:  [68-96] 76  Resp:  [16-18] 18  SpO2:  [96 %-100 %] 97 %  BP: ()/(56-68) 101/59     Weight: 68.9 kg (152 lb)  Body mass index is 26.93 kg/m².    Intake/Output - Last 3 Shifts       None             Physical Exam  Vitals and nursing note reviewed.   Constitutional:       Appearance: Normal appearance.      Comments: Moving around in bed a lot, appears uncomfortable    HENT:      Head: Normocephalic and atraumatic.   Cardiovascular:      Rate and Rhythm: Normal rate and regular rhythm.   Pulmonary:      Effort: Pulmonary effort is normal. No respiratory distress.   Abdominal:      General: Abdomen is flat.      Palpations: Abdomen is soft. There is no mass.      Hernia: No hernia is present.      Comments: Multiple prior scars from prior surgeries, well healed  Pain in epigastrium primarily, moderately tender to palpation   Soft, nondistended   Musculoskeletal:      Right lower leg: No edema.      Left lower leg: No edema.   Skin:     General: Skin is warm and dry.   Neurological:      General: No focal deficit present.      Mental Status: She is alert and oriented to person, place, and time.   Psychiatric:         Mood and Affect: Mood normal.         Behavior: Behavior normal.         Significant Labs:  I have reviewed all pertinent lab results within the past 24 hours.    Significant Diagnostics:  I have reviewed all pertinent imaging results/findings within the past 24 hours.

## 2023-05-18 NOTE — ANESTHESIA PROCEDURE NOTES
Intubation    Date/Time: 5/18/2023 10:06 AM  Performed by: Fabiana Warner CRNA  Authorized by: Andrea Foster III, MD     Intubation:     Induction:  Intravenous    Intubated:  Postinduction    Mask Ventilation:  Not attempted    Attempts:  1    Attempted By:  CRNA    Method of Intubation:  Video laryngoscopy    Blade:  Bloom 3    Laryngeal View Grade: Grade I - full view of cords      Difficult Airway Encountered?: No      Complications:  None    Airway Device:  Oral endotracheal tube    Airway Device Size:  7.5    Style/Cuff Inflation:  Cuffed    Inflation Amount (mL):  5    Tube secured:  21    Secured at:  The lips    Placement Verified By:  Capnometry    Complicating Factors:  None    Findings Post-Intubation:  BS equal bilateral and atraumatic/condition of teeth unchanged

## 2023-05-18 NOTE — ED NOTES
"Surgery RN team at bedside to bring pt to OR. Report given, pt is A&Ox4 LR runs at 125ml/hr to 20G RFA pts belongings at bedside chart given to RN with no surgical consent.     Per pt she signed consent with unknown resident "last night after I got transferred here". Pt requesting pain meds for headache. Meds held as pt is NPO and consents need to be verified and/or signed. Pt aware   "

## 2023-05-18 NOTE — CONSULTS
[facility] - Emergency Dept  General Surgery  Consult Note    Patient Name: Sarah Padron  MRN: [id number]  Code Status: Full Code  Admission Date: 5/17/2023  Hospital Length of Stay: 0 days  Attending Physician: Davy Barnes MD  Primary Care Provider: Margarito Neil MD    Patient information was obtained from patient, past medical records and ER records.     Inpatient consult to General surgery  Consult performed by: Pietro Dale MD  Consult ordered by: Elissa Motley MD        Subjective:     Principal Problem: Internal hernia    History of Present Illness: Sarah Padron is a 43 y.o. female with PMHx of obesity s/p lap band in 2008, lap band removal with conversion to bypass in 2014, ex lap with reduction of internal hernia in 2016 who presents as transfer for evaluation of an internal hernia. 4 days ago she developed epigastric pain after eating eggplant parmesan. This pain got progressively worse prompting her visit to an outside ED yesterday. She notes nausea and dry heaving, but no vomiting. Her last BM was yesterday, denies blood in stool, diarrhea, constipation. No fevers at home. She states her pain has now almost completely resolved after receiving a dose of Morphine.   She is hemodynamically stable with HR 70s to 80s, sBP 110s to 120s. WBC within normal limits, labs unremarkable overall. CT A/P with evidence of internal hernia.             No current facility-administered medications on file prior to encounter.     Current Outpatient Medications on File Prior to Encounter   Medication Sig    amitriptyline (ELAVIL) 25 MG tablet Take 1 tablet (25 mg total) by mouth nightly as needed for Insomnia.    ergocalciferol (ERGOCALCIFEROL) 50,000 unit Cap Take 1 capsule (50,000 Units total) by mouth twice a week.    ergocalciferol (VITAMIN D2) 50,000 unit Cap Take 1 capsule (50,000 Units total) by mouth twice a week.    esomeprazole (NEXIUM PACKET) 40 mg GrPS Take 40 mg  by mouth before breakfast. Open capsule and dissolve pellets in water    pediatric multivit-iron-min (FLINTSTONES COMPLETE, IRON,) Chew Take 1 tablet by mouth 2 (two) times daily.    promethazine (PHENERGAN) 25 MG tablet Take 0.5 tablets (12.5 mg total) by mouth every 6 (six) hours as needed for Nausea.       Review of patient's allergies indicates:   Allergen Reactions    Dilaudid [hydromorphone] Itching       No past medical history on file.  Past Surgical History:   Procedure Laterality Date    ABDOMINAL SURGERY      BREAST SURGERY      lift    CHOLECYSTECTOMY      EYE SURGERY Right     cataract    GASTRIC BYPASS      HERNIA REPAIR N/A 2016    internal    lap band removal  7/9/14    LAPAROSCOPIC GASTRIC BANDING      LAPAROSCOPIC GASTRIC BANDING  2008    revision     Family History       Problem Relation (Age of Onset)    Hypertension Mother, Father    Obesity Father          Tobacco Use    Smoking status: Never    Smokeless tobacco: Never   Substance and Sexual Activity    Alcohol use: Yes     Comment: ocassional; wine    Drug use: No    Sexual activity: Yes     Partners: Male     Review of Systems   Constitutional:  Negative for fever.   Respiratory:  Negative for shortness of breath.    Cardiovascular:  Negative for chest pain.   Gastrointestinal:  Positive for abdominal pain and nausea. Negative for blood in stool, constipation, diarrhea and vomiting.   Genitourinary:  Negative for difficulty urinating.   Objective:     Vital Signs (Most Recent):  Temp: 98.2 °F (36.8 °C) (05/17/23 2313)  Pulse: 89 (05/17/23 2345)  Resp: 18 (05/17/23 2345)  BP: (!) 121/58 (05/17/23 2345)  SpO2: 99 % (05/17/23 2345) Vital Signs (24h Range):  Temp:  [98.2 °F (36.8 °C)] 98.2 °F (36.8 °C)  Pulse:  [89-96] 89  Resp:  [16-18] 18  SpO2:  [98 %-99 %] 99 %  BP: (121-122)/(58-68) 121/58     Weight: 68.9 kg (152 lb)  Body mass index is 26.93 kg/m².     Physical Exam  Vitals reviewed.   Constitutional:       General: She  is not in acute distress.     Appearance: She is well-developed.   HENT:      Head: Normocephalic and atraumatic.   Eyes:      General:         Right eye: No discharge.         Left eye: No discharge.      Conjunctiva/sclera: Conjunctivae normal.   Cardiovascular:      Rate and Rhythm: Normal rate and regular rhythm.   Pulmonary:      Effort: Pulmonary effort is normal. No respiratory distress.   Abdominal:      General: There is no distension.      Palpations: Abdomen is soft.      Tenderness: There is no abdominal tenderness. There is no guarding or rebound.      Comments: Non tender to deep palpation in all four quadrants  Well-healed incision   Musculoskeletal:         General: No deformity. Normal range of motion.      Cervical back: Normal range of motion.   Skin:     General: Skin is warm and dry.   Neurological:      Mental Status: She is alert and oriented to person, place, and time.   Psychiatric:         Behavior: Behavior normal.          I have reviewed all pertinent lab results within the past 24 hours.  CBC:   Recent Labs   Lab 05/18/23  0035   WBC 5.90   RBC 4.02   HGB 9.0*   HCT 30.7*      MCV 76*   MCH 22.4*   MCHC 29.3*     CMP: No results for input(s): GLU, CALCIUM, ALBUMIN, PROT, NA, K, CO2, CL, BUN, CREATININE, ALKPHOS, ALT, AST, BILITOT in the last 168 hours.    Significant Diagnostics:  I have reviewed all pertinent imaging results/findings within the past 24 hours.    CT A/P  INTERVAL DEVELOPMENT OF WHIRL SIGN AT THE ROOT OF THE MESENTERY, LIKELY SECONDARY TO INTERNAL HERNIA. THERE IS ASSOCIATED DELAYED OPACIFICATION OF THE MESENTERIC VENOUS BRANCHES AND SOFT TISSUE STRANDING OF THE SMALL BOWEL MESENTERY, CONSISTENT WITH SOME DEGREE OF ISCHEMIA.     PREVIOUS GASTRIC BYPASS AND CHOLECYSTECTOMY.     INTERVAL DEVELOPMENT OF RADIOGRAPHIC AREA OF DECREASED DENSITY IN THE POSTERIOR LATERAL SEGMENT OF THE LEFT HEPATIC LOBE, LIKELY REPRESENTING FOCAL AREA OF FATTY INFILTRATION. THIS MAY BE  CONFIRMED WITH ULTRASOUND OF THE ABDOMEN ON A NONEMERGENT BASIS.     INCIDENTAL FINDING OF HEPATIC CYSTS.        Assessment/Plan:     * Internal hernia  43 y.o. female with PMHx of obesity s/p lap band in 2008, lap band removal with conversion to bypass in 2014, ex lap with reduction of internal hernia in 2016 who presents as transfer for evaluation of an internal hernia.     - Admit to general surgery  - Plan for OR today for diag lap, possible ex lap; consent obtained (in folder)  - F/u lactate  - NPO  - mIVF  - Pain meds prn  - Antiemetics prn  - Daily labs  - Please call with questions or concerns      VTE Risk Mitigation (From admission, onward)         Ordered     IP VTE HIGH RISK PATIENT  Once         05/18/23 0046     Place sequential compression device  Until discontinued         05/18/23 0046                    Pietro Dale MD  General Surgery  [facility] - [facility] Dept

## 2023-05-18 NOTE — ED NOTES
Pt stated was having bilateral flank pain, but has improved since medication was given. Pt denies any nausea or vomiting. Pt breathing regular, unlabored, and symmetrical with no accessory muscle use. Pt abdomen is soft, non distended, and non tender. Pt skin is warm, dry, and intact. Pt is AAOx4, ambulatory, and VSS.

## 2023-05-18 NOTE — NURSING TRANSFER
Nursing Transfer Note      5/18/2023     Transfer To: 509    Transfer via stretcher    Transported by transporter    Medicines sent: LR gtt    Chart send with patient: Yes    Notified: mother    Patient reassessed at: 5/18/2023 1300    Report given to RADHA Augustine

## 2023-05-18 NOTE — ASSESSMENT & PLAN NOTE
43 y.o. female with PMHx of obesity s/p lap band in 2008, lap band removal with conversion to bypass in 2014, ex lap with reduction of internal hernia in 2016 who presents as transfer for evaluation of an internal hernia.     - Admit to general surgery  - Plan for OR today for diag lap, possible ex lap; consent obtained (in folder)  - F/u lactate  - NPO  - mIVF  - Pain meds prn  - Antiemetics prn  - Daily labs  - Please call with questions or concerns

## 2023-05-18 NOTE — BRIEF OP NOTE
[facility] - Surgery ([facility])  Brief Operative Note    SUMMARY     Surgery Date: 5/18/2023     Surgeon(s) and Role:     * Davy Barnes MD - Primary     * Lidia Cruz MD - Resident - Assisting        Pre-op Diagnosis:  Internal hernia [K45.8]    Post-op Diagnosis:  Post-Op Diagnosis Codes:     * Internal hernia [K45.8]    Procedure(s) (LRB):  LAPAROSCOPY, DIAGNOSTIC (N/A)    Anesthesia: General    Operative Findings: Entirety of bowel ran from cecum to ligament of treitz and closely examined. Internal hernia through Galeana's space, bowel reduced and appeared viable. Space closed with Endostitch.    Estimated Blood Loss: Minimal    Estimated Blood Loss has been documented.         Specimens:   Specimen (24h ago, onward)      None            [id number]

## 2023-05-18 NOTE — NURSING
Nurses Note -- 4 Eyes      5/18/2023   4:56 PM      Skin assessed during: Admit      [x] No Altered Skin Integrity Present    []Prevention Measures Documented      [] Yes- Altered Skin Integrity Present or Discovered   [] LDA Added if Not in Epic (Describe Wound)   [] New Altered Skin Integrity was Present on Admit and Documented in LDA   [] Wound Image Taken    Wound Care Consulted? NO    Attending Nurse:  Ingrid De Souza RN     Second RN/Staff Member: Fawn Hardy RN

## 2023-05-18 NOTE — PROGRESS NOTES
[facility] Dept  General Surgery  Progress Note    Subjective:     History of Present Illness:  Sarah aPdron is a 43 y.o. female with PMHx of obesity s/p lap band in 2008, lap band removal with conversion to bypass in 2014, ex lap with reduction of internal hernia in 2016 who presents as transfer for evaluation of an internal hernia. 4 days ago she developed epigastric pain after eating eggplant parmesan. This pain got progressively worse prompting her visit to an outside ED yesterday. She notes nausea and dry heaving, but no vomiting. Her last BM was yesterday, denies blood in stool, diarrhea, constipation. No fevers at home. She states her pain has now almost completely resolved after receiving a dose of Morphine.   She is hemodynamically stable with HR 70s to 80s, sBP 110s to 120s. WBC within normal limits, labs unremarkable overall. CT A/P with evidence of internal hernia.             Post-Op Info:  Procedure(s) (LRB):  LAPAROSCOPY, DIAGNOSTIC, possible ex lap, possible bowel resection (N/A)         Interval History: NARESH  Admitted from ED for internal hernia; plan for operative exploration this am, time pending staff, OR availability    Medications:  Continuous Infusions:   lactated ringers 125 mL/hr at 05/18/23 0202     Scheduled Meds:  PRN Meds:HYDROmorphone, LIDOcaine (PF) 10 mg/ml (1%), ondansetron, oxyCODONE, sodium chloride 0.9%     Review of patient's allergies indicates:   Allergen Reactions    Dilaudid [hydromorphone] Itching     Objective:     Vital Signs (Most Recent):  Temp: 97.8 °F (36.6 °C) (05/18/23 0503)  Pulse: 76 (05/18/23 0503)  Resp: 18 (05/18/23 0503)  BP: (!) 101/59 (05/18/23 0503)  SpO2: 97 % (05/18/23 0503) Vital Signs (24h Range):  Temp:  [97.8 °F (36.6 °C)-98.2 °F (36.8 °C)] 97.8 °F (36.6 °C)  Pulse:  [68-96] 76  Resp:  [16-18] 18  SpO2:  [96 %-100 %] 97 %  BP: ()/(56-68) 101/59     Weight: 68.9 kg (152 lb)  Body mass index is 26.93  kg/m².    Intake/Output - Last 3 Shifts       None             Physical Exam  Vitals and nursing note reviewed.   Constitutional:       Appearance: Normal appearance.      Comments: Moving around in bed a lot, appears uncomfortable    HENT:      Head: Normocephalic and atraumatic.   Cardiovascular:      Rate and Rhythm: Normal rate and regular rhythm.   Pulmonary:      Effort: Pulmonary effort is normal. No respiratory distress.   Abdominal:      General: Abdomen is flat.      Palpations: Abdomen is soft. There is no mass.      Hernia: No hernia is present.      Comments: Multiple prior scars from prior surgeries, well healed  Pain in epigastrium primarily, moderately tender to palpation   Soft, nondistended   Musculoskeletal:      Right lower leg: No edema.      Left lower leg: No edema.   Skin:     General: Skin is warm and dry.   Neurological:      General: No focal deficit present.      Mental Status: She is alert and oriented to person, place, and time.   Psychiatric:         Mood and Affect: Mood normal.         Behavior: Behavior normal.        Significant Labs:  I have reviewed all pertinent lab results within the past 24 hours.    Significant Diagnostics:  I have reviewed all pertinent imaging results/findings within the past 24 hours.    Assessment/Plan:     * Internal hernia  43 y.o. female with PMHx of obesity s/p lap band in 2008, lap band removal with conversion to bypass in 2014, ex lap with reduction of internal hernia in 2016 who presents as transfer for evaluation of an internal hernia.     - Plan for OR today for diag lap, possible ex lap; consent obtained (in folder)  - lactate normal  - NPO  - mIVF  - Pain meds prn  - Antiemetics prn  - Daily labs  - Please call with questions or concerns        Shelley Funk MD  General Surgery  [facility] - Emergency Dept

## 2023-05-18 NOTE — OP NOTE
DATE OF PROCEDURE: 5/18/2023    PRE OP DIAGNOSIS: Internal hernia [K45.8]    POST OP DIAGNOSIS: Internal hernia [K45.8]    PROCEDURE: Procedure(s) (LRB):  LAPAROSCOPY, DIAGNOSTIC (N/A)    Surgeon(s) and Role:     * Davy Barnes MD - Primary     * Lidia Cruz MD - Resident - Assisting    ANESTHESIA: General.     Procedure:    The patient was placed under general anesthesia and prepped and draped in the usual manner.  Access to the peritoneum was gained through the umbilicus and pneumoperitoneum to 15mm with CO2 was obtained.  2 5mm trocars were placed in the left abdomen and a 5 and 10mm trocar placed in the right.  There was no ischemic appearing bowel.  The small bowel was run from the cecum to the jj and then to the proximal stomach pouch and the LOT.  A retro andre internal hernia was reduced and there was obstructive changes in the bowel and mesentery without ischemic changes such as lymphatic obstructive changes and there was chronic inflammatory change around the edges of the hernia defect.  This was repaired with running 2-0 permanent endostitch.  There was no jj defect.  No other abnormalities seen.  The abdomen was inspected for hemostasis, trocars removed under direct vision and pneumoperitoneum was released prior to the last trocar was removed.  The fascia at the navel was repaired with 0 vicryl and skin incisions infiltrated with marcaine and repaired with 4-0 plain catgut and reinforced with dermabond.  The patient tolerated the procedure well and brought to the recovery room in stable condition.  Sponge and needle counts were correct at the end of the case.  Blood loss was minimal, complications none and pathology none.

## 2023-05-18 NOTE — ANESTHESIA PREPROCEDURE EVALUATION
05/18/2023  Pre-operative evaluation for Procedure(s) (LRB):  LAPAROSCOPY, DIAGNOSTIC (N/A)  LAPAROTOMY, EXPLORATORY (N/A)  EXCISION, SMALL INTESTINE (N/A)    Sarah Padron is a 43 y.o. female with PMHx of obesity s/p lap band in 2008, lap band removal with conversion to bypass in 2014, ex lap with reduction of internal hernia in 2016 who presents as transfer for evaluation of an internal hernia.     Patient Active Problem List   Diagnosis    Vitamin D deficiency    History of Yogesh-en-Y gastric bypass    Cervical radiculopathy    Facet arthropathy, cervical    DDD (degenerative disc disease)    Spondylosis without myelopathy    Cervical radicular pain    Pain in limb    Internal hernia    Supervision of normal pregnancy in third trimester    BMI 27.0-27.9,adult       Review of patient's allergies indicates:   Allergen Reactions    Dilaudid [hydromorphone] Itching       No current facility-administered medications on file prior to encounter.     Current Outpatient Medications on File Prior to Encounter   Medication Sig Dispense Refill    amitriptyline (ELAVIL) 25 MG tablet Take 1 tablet (25 mg total) by mouth nightly as needed for Insomnia. 30 tablet 2    ergocalciferol (ERGOCALCIFEROL) 50,000 unit Cap Take 1 capsule (50,000 Units total) by mouth twice a week. 24 capsule 0    ergocalciferol (VITAMIN D2) 50,000 unit Cap Take 1 capsule (50,000 Units total) by mouth twice a week. 24 capsule 0    esomeprazole (NEXIUM PACKET) 40 mg GrPS Take 40 mg by mouth before breakfast. Open capsule and dissolve pellets in water 1200 mg 2    pediatric multivit-iron-min (FLINTSTONES COMPLETE, IRON,) Chew Take 1 tablet by mouth 2 (two) times daily.      promethazine (PHENERGAN) 25 MG tablet Take 0.5 tablets (12.5 mg total) by mouth every 6 (six) hours as needed for Nausea. 15 tablet 0       Past  Surgical History:   Procedure Laterality Date    ABDOMINAL SURGERY      BREAST SURGERY      lift    CHOLECYSTECTOMY      EYE SURGERY Right     cataract    GASTRIC BYPASS      HERNIA REPAIR N/A 2016    internal    lap band removal  14    LAPAROSCOPIC GASTRIC BANDING      LAPAROSCOPIC GASTRIC BANDING  2008    revision       Social History     Socioeconomic History    Marital status:    Tobacco Use    Smoking status: Never    Smokeless tobacco: Never   Substance and Sexual Activity    Alcohol use: Yes     Comment: ocassional; wine    Drug use: No    Sexual activity: Yes     Partners: Male         CBC:   Recent Labs     23  0035 23  0409   WBC 5.90 5.53   RBC 4.02 3.98*   HGB 9.0* 8.6*   HCT 30.7* 30.5*    322   MCV 76* 77*   MCH 22.4* 21.6*   MCHC 29.3* 28.2*       CMP:   Recent Labs     235 23  0409    140   K 3.4* 3.7    107   CO2 23 25   BUN 14 14   CREATININE 0.7 0.7   GLU 83 89   MG  --  2.0   PHOS  --  4.4   CALCIUM 8.2* 8.2*   ALBUMIN 3.5 3.3*   PROT 6.1 5.8*   ALKPHOS 49* 45*   ALT 15 14   AST 21 20   BILITOT 0.5 0.4       INR  No results for input(s): PT, INR, PROTIME, APTT in the last 72 hours.        Diagnostic Studies:      EKD Echo:  No results found for this or any previous visit.        Pre-op Assessment    I have reviewed the Patient Summary Reports.     I have reviewed the Nursing Notes.       Review of Systems  Anesthesia Hx:  No problems with previous Anesthesia    Hematology/Oncology:     Oncology Normal    -- Anemia:   EENT/Dental:EENT/Dental Normal   Cardiovascular:  Cardiovascular Normal     Pulmonary:  Pulmonary Normal    Renal/:  Renal/ Normal     Hepatic/GI:   Extensive abdominal surgical history now s/p gastric bypass with history of internal hernia   Musculoskeletal:   Arthritis   Spine Disorders: cervical    Endocrine:  Endocrine Normal    Psych:  Psychiatric Normal           Physical Exam  General: Well  nourished, Cooperative, Alert and Oriented    Airway:  Mallampati: I   Mouth Opening: Small, but > 3cm  TM Distance: Normal  Neck ROM: Normal ROM        Anesthesia Plan  Type of Anesthesia, risks & benefits discussed:    Anesthesia Type: Gen ETT  Intra-op Monitoring Plan: Standard ASA Monitors  Post Op Pain Control Plan: multimodal analgesia and IV/PO Opioids PRN  Induction:  IV and rapid sequence  Airway Plan: Video  Informed Consent: Informed consent signed with the Patient and all parties understand the risks and agree with anesthesia plan.  All questions answered.   ASA Score: 3 Emergent  Day of Surgery Review of History & Physical: H&P Update referred to the surgeon/provider.    Ready For Surgery From Anesthesia Perspective.     .

## 2023-05-19 VITALS
WEIGHT: 152 LBS | BODY MASS INDEX: 26.93 KG/M2 | DIASTOLIC BLOOD PRESSURE: 58 MMHG | TEMPERATURE: 97 F | RESPIRATION RATE: 18 BRPM | HEIGHT: 63 IN | OXYGEN SATURATION: 100 % | HEART RATE: 73 BPM | SYSTOLIC BLOOD PRESSURE: 100 MMHG

## 2023-05-19 LAB
ALBUMIN SERPL BCP-MCNC: 2.9 G/DL (ref 3.5–5.2)
ALP SERPL-CCNC: 46 U/L (ref 55–135)
ALT SERPL W/O P-5'-P-CCNC: 11 U/L (ref 10–44)
ANION GAP SERPL CALC-SCNC: 8 MMOL/L (ref 8–16)
AST SERPL-CCNC: 18 U/L (ref 10–40)
BASOPHILS # BLD AUTO: 0.08 K/UL (ref 0–0.2)
BASOPHILS NFR BLD: 1.1 % (ref 0–1.9)
BILIRUB SERPL-MCNC: 0.5 MG/DL (ref 0.1–1)
BUN SERPL-MCNC: 7 MG/DL (ref 6–20)
CALCIUM SERPL-MCNC: 8.3 MG/DL (ref 8.7–10.5)
CHLORIDE SERPL-SCNC: 105 MMOL/L (ref 95–110)
CO2 SERPL-SCNC: 27 MMOL/L (ref 23–29)
CREAT SERPL-MCNC: 0.6 MG/DL (ref 0.5–1.4)
DIFFERENTIAL METHOD: ABNORMAL
EOSINOPHIL # BLD AUTO: 0.1 K/UL (ref 0–0.5)
EOSINOPHIL NFR BLD: 1.7 % (ref 0–8)
ERYTHROCYTE [DISTWIDTH] IN BLOOD BY AUTOMATED COUNT: 18.7 % (ref 11.5–14.5)
EST. GFR  (NO RACE VARIABLE): >60 ML/MIN/1.73 M^2
GLUCOSE SERPL-MCNC: 87 MG/DL (ref 70–110)
HCT VFR BLD AUTO: 28.6 % (ref 37–48.5)
HGB BLD-MCNC: 8.1 G/DL (ref 12–16)
IMM GRANULOCYTES # BLD AUTO: 0.02 K/UL (ref 0–0.04)
IMM GRANULOCYTES NFR BLD AUTO: 0.3 % (ref 0–0.5)
LYMPHOCYTES # BLD AUTO: 1.5 K/UL (ref 1–4.8)
LYMPHOCYTES NFR BLD: 20.9 % (ref 18–48)
MAGNESIUM SERPL-MCNC: 1.9 MG/DL (ref 1.6–2.6)
MCH RBC QN AUTO: 21.8 PG (ref 27–31)
MCHC RBC AUTO-ENTMCNC: 28.3 G/DL (ref 32–36)
MCV RBC AUTO: 77 FL (ref 82–98)
MONOCYTES # BLD AUTO: 0.8 K/UL (ref 0.3–1)
MONOCYTES NFR BLD: 11.5 % (ref 4–15)
NEUTROPHILS # BLD AUTO: 4.6 K/UL (ref 1.8–7.7)
NEUTROPHILS NFR BLD: 64.5 % (ref 38–73)
NRBC BLD-RTO: 0 /100 WBC
PHOSPHATE SERPL-MCNC: 3.6 MG/DL (ref 2.7–4.5)
PLATELET # BLD AUTO: 309 K/UL (ref 150–450)
PMV BLD AUTO: 10 FL (ref 9.2–12.9)
POTASSIUM SERPL-SCNC: 3.5 MMOL/L (ref 3.5–5.1)
PROT SERPL-MCNC: 5 G/DL (ref 6–8.4)
RBC # BLD AUTO: 3.72 M/UL (ref 4–5.4)
SODIUM SERPL-SCNC: 140 MMOL/L (ref 136–145)
WBC # BLD AUTO: 7.16 K/UL (ref 3.9–12.7)

## 2023-05-19 PROCEDURE — 63600175 PHARM REV CODE 636 W HCPCS: Performed by: STUDENT IN AN ORGANIZED HEALTH CARE EDUCATION/TRAINING PROGRAM

## 2023-05-19 PROCEDURE — 83735 ASSAY OF MAGNESIUM: CPT | Performed by: STUDENT IN AN ORGANIZED HEALTH CARE EDUCATION/TRAINING PROGRAM

## 2023-05-19 PROCEDURE — 94761 N-INVAS EAR/PLS OXIMETRY MLT: CPT

## 2023-05-19 PROCEDURE — 36415 COLL VENOUS BLD VENIPUNCTURE: CPT | Performed by: STUDENT IN AN ORGANIZED HEALTH CARE EDUCATION/TRAINING PROGRAM

## 2023-05-19 PROCEDURE — 25000003 PHARM REV CODE 250: Performed by: STUDENT IN AN ORGANIZED HEALTH CARE EDUCATION/TRAINING PROGRAM

## 2023-05-19 PROCEDURE — C9113 INJ PANTOPRAZOLE SODIUM, VIA: HCPCS | Performed by: STUDENT IN AN ORGANIZED HEALTH CARE EDUCATION/TRAINING PROGRAM

## 2023-05-19 PROCEDURE — 80053 COMPREHEN METABOLIC PANEL: CPT | Performed by: STUDENT IN AN ORGANIZED HEALTH CARE EDUCATION/TRAINING PROGRAM

## 2023-05-19 PROCEDURE — 84100 ASSAY OF PHOSPHORUS: CPT | Performed by: STUDENT IN AN ORGANIZED HEALTH CARE EDUCATION/TRAINING PROGRAM

## 2023-05-19 PROCEDURE — 94799 UNLISTED PULMONARY SVC/PX: CPT

## 2023-05-19 PROCEDURE — 85025 COMPLETE CBC W/AUTO DIFF WBC: CPT | Performed by: STUDENT IN AN ORGANIZED HEALTH CARE EDUCATION/TRAINING PROGRAM

## 2023-05-19 RX ORDER — POLYETHYLENE GLYCOL 3350 17 G/17G
17 POWDER, FOR SOLUTION ORAL DAILY
Refills: 0 | COMMUNITY
Start: 2023-05-19 | End: 2023-10-03

## 2023-05-19 RX ORDER — ESOMEPRAZOLE MAGNESIUM 40 MG/1
40 GRANULE, DELAYED RELEASE ORAL
Qty: 30 EACH | Refills: 11 | Status: SHIPPED | OUTPATIENT
Start: 2023-05-19 | End: 2023-10-03

## 2023-05-19 RX ORDER — OXYCODONE HYDROCHLORIDE 5 MG/1
5 TABLET ORAL EVERY 4 HOURS PRN
Qty: 20 TABLET | Refills: 0 | Status: ON HOLD | OUTPATIENT
Start: 2023-05-19 | End: 2023-07-06 | Stop reason: HOSPADM

## 2023-05-19 RX ADMIN — OXYCODONE HYDROCHLORIDE 10 MG: 10 TABLET ORAL at 01:05

## 2023-05-19 RX ADMIN — OXYCODONE HYDROCHLORIDE 10 MG: 10 TABLET ORAL at 07:05

## 2023-05-19 RX ADMIN — POLYETHYLENE GLYCOL 3350 17 G: 17 POWDER, FOR SOLUTION ORAL at 08:05

## 2023-05-19 RX ADMIN — METHOCARBAMOL 500 MG: 100 INJECTION INTRAMUSCULAR; INTRAVENOUS at 06:05

## 2023-05-19 RX ADMIN — PANTOPRAZOLE SODIUM 40 MG: 40 INJECTION, POWDER, FOR SOLUTION INTRAVENOUS at 08:05

## 2023-05-19 RX ADMIN — ACETAMINOPHEN 1000 MG: 10 INJECTION INTRAVENOUS at 06:05

## 2023-05-19 RX ADMIN — PIPERACILLIN AND TAZOBACTAM 4.5 G: 4; .5 INJECTION, POWDER, LYOPHILIZED, FOR SOLUTION INTRAVENOUS; PARENTERAL at 12:05

## 2023-05-19 NOTE — PLAN OF CARE
[facility] - [facility]  Initial Discharge Assessment       Primary Care Provider: Margarito Neil MD    Admission Diagnosis: Internal hernia [K45.8]    Admission Date: 5/17/2023  Expected Discharge Date: 5/19/2023    Transition of Care Barriers: None    Payor: [facility] RESOURCES / Plan: [facility] HEALTH / Product Type: Commercial /     Extended Emergency Contact Information  Primary Emergency Contact: Niels Padron  Address: [address]            [place] [address] [facility]  Home Phone: [phone]  Mobile Phone: [phone]  Relation: Spouse    Discharge Plan A: Home with family         [id number] - [place] - [address] [facility] [address] [place]  [address]  [place] [address]  Phone: [phone] Fax: [phone]      Initial Assessment (most recent)       Adult Discharge Assessment - 05/19/23 1100          Discharge Assessment    Assessment Type Discharge Planning Assessment     Confirmed/corrected address, phone number and insurance Yes     Confirmed Demographics Correct on Facesheet     Source of Information patient     Communicated AMANDA with patient/caregiver Yes     People in Home child(aidan), dependent     Prior to hospitilization cognitive status: Alert/Oriented     Current cognitive status: Alert/Oriented     Home Accessibility wheelchair accessible     Home Layout Able to live on 1st floor     Equipment Currently Used at Home none     Readmission within 30 days? No     Patient currently being followed by outpatient case management? No     Do you currently have service(s) that help you manage your care at home? No     Do you take prescription medications? Yes     Do you have prescription coverage? Yes     Is the patient taking medications as prescribed? yes     How do you get to doctors appointments? car, drives self;family or friend will provide     Are you on dialysis? No     Do you take coumadin? No     Discharge Plan A Home with family     DME  Needed Upon Discharge  none     Discharge Plan discussed with: Patient     Transition of Care Barriers None        Social Connections    Are you , , , , never , or living with a partner?                          SW completed discharge planning assessment with the patient at bedside. SW verified demographic information listed on the pt.'s Face sheet.Pt reports living alone in a single story home w/ her children. Pt is . Pt reports having a good support system, pt's mother ans/or sister able to help mange her care upon d/c.SW is following this Pt for DC planning needs. There are no identified needs at this time.    Mary Galeas, CHRISTOFER  Case Management   [facility]   Ext. [phone]

## 2023-05-19 NOTE — DISCHARGE SUMMARY
HOSPITAL DISCHARGE SUMMARY      I.   IDENTIFYING DATA         A.  Name:Sarah Padron              Sex:female              MRN:[id number]              :1980              Date of admission:2023 11:15 PM              Date of discharge: 2023  2:50 PM       II. SUCCINCT SUMMARY OF ADMISSION STATUS AND HOSPITAL COURSE    For details of hospital stay, please refer to daily progress notes. Briefly, this is a 43 y.o. female presented on  with several days of abdominal pain admitted for internal hernia.  Patient was taken to OR  later that day and underwent laparoscopic repair of a large internal hernia. Post-operatively patient was admitted to the floor and had an uncomplicated hospital recovery.     On the day of discharge, the patient was ambulating without difficulty, voiding spontaneously, was tolerating a diet without nausea or vomiting, and pain was well controlled on PO pain medications. Discharge instructions were explained to the patient and appropriate follow-up was arranged.      III. PATIENT'S MEDICAL CONDITION AT DISCHARGE       good    IV. SUMMARY OF PROCEDURE(S) PERFORMED          Procedure(s) (LRB):  LAPAROSCOPY, DIAGNOSTIC (N/A)      V.  DISCHARGE DIAGNOSES        Internal hernia       VI. RESULTS PENDING AT TIME OF DISCHARGE         None    VII. MEDICATIONS TAKEN PRIOR TO ADMISSION    Current Outpatient Medications   Medication Instructions    amitriptyline (ELAVIL) 25 mg, Oral, Nightly PRN    ergocalciferol (ERGOCALCIFEROL) 50,000 Units, Oral, Twice weekly    ergocalciferol (VITAMIN D2) 50,000 Units, Oral, Twice weekly    esomeprazole (NEXIUM PACKET) 40 mg, Oral, Before breakfast, Open capsule and dissolve pellets in water    oxyCODONE (ROXICODONE) 5 mg, Oral, Every 4 hours PRN    pediatric multivit-iron-min (FLINTSTONES COMPLETE, IRON,) Chew 1 tablet, Oral, 2 times daily    polyethylene glycol (GLYCOLAX) 17 g, Oral, Daily    promethazine (PHENERGAN) 12.5 mg, Oral, Every 6  hours PRN    QUEtiapine (SEROQUEL) 50 mg, Oral, Nightly PRN        VIII. MEDICATIONS TO BE TAKEN FOLLOWING DISCHARGE       Medication List        START taking these medications      oxyCODONE 5 MG immediate release tablet  Commonly known as: ROXICODONE  Take 1 tablet (5 mg total) by mouth every 4 (four) hours as needed for Pain.     polyethylene glycol 17 gram Pwpk  Commonly known as: GLYCOLAX  Take 17 g by mouth once daily.            CONTINUE taking these medications      amitriptyline 25 MG tablet  Commonly known as: ELAVIL  Take 1 tablet (25 mg total) by mouth nightly as needed for Insomnia.     * ergocalciferol 50,000 unit Cap  Commonly known as: VITAMIN D2  Take 1 capsule (50,000 Units total) by mouth twice a week.     * ergocalciferol 50,000 unit Cap  Commonly known as: ERGOCALCIFEROL  Take 1 capsule (50,000 Units total) by mouth twice a week.     esomeprazole 40 mg Grps  Commonly known as: NexIUM Packet  Take 40 mg by mouth before breakfast. Open capsule and dissolve pellets in water     FLINTSTONES COMPLETE (IRON) Chew  Generic drug: pediatric multivit-iron-min     promethazine 25 MG tablet  Commonly known as: PHENERGAN  Take 0.5 tablets (12.5 mg total) by mouth every 6 (six) hours as needed for Nausea.     QUEtiapine 25 MG Tab  Commonly known as: SEROQUEL           * This list has 2 medication(s) that are the same as other medications prescribed for you. Read the directions carefully, and ask your doctor or other care provider to review them with you.                   Where to Get Your Medications        These medications were sent to [facility] [address], [place] [address]      Hours: Mon-Fri 7a-7p, Sat-Sun 10a-4p Phone: [phone]   esomeprazole 40 mg Grps  oxyCODONE 5 MG immediate release tablet       You can get these medications from any pharmacy    You don't need a prescription for these medications  polyethylene glycol 17 gram Pwpk          IX.  DISCHARGE ORDERS AND  INSTRUCTIONS    Discharge Procedure Orders   Diet clear liquid   Order Comments: Advance as tolerated, per patient request     No driving until:   Order Comments: No driving while still taking pain medications daily.   Take a stool softener while taking pain medications daily.     Lifting restrictions   Order Comments: No lifting more than 10 lbs for 6 weeks.     Notify your health care provider if you experience any of the following:  increased confusion or weakness     Notify your health care provider if you experience any of the following:  persistent dizziness, light-headedness, or visual disturbances     Notify your health care provider if you experience any of the following:  worsening rash     Notify your health care provider if you experience any of the following:  severe persistent headache     Notify your health care provider if you experience any of the following:  difficulty breathing or increased cough     Notify your health care provider if you experience any of the following:  redness, tenderness, or signs of infection (pain, swelling, redness, odor or green/yellow discharge around incision site)     Notify your health care provider if you experience any of the following:  severe uncontrolled pain     Notify your health care provider if you experience any of the following:  persistent nausea and vomiting or diarrhea     Notify your health care provider if you experience any of the following:  temperature >100.4     No dressing needed   Order Comments: Okay to take shower and get incision wet in 48 hours. Do NOT soak/submerge incision in water (aka no bathing, swimming, etc) for 2 weeks. Skin glue overlying incision will fall off on its own in 2-3 weeks.     Activity as tolerated       X. DISCHARGE DISPOSITION          Home or Self Care      Debbie De Santiago M.D.  General Surgery, PGY-1

## 2023-05-19 NOTE — SUBJECTIVE & OBJECTIVE
Interval History: NAEON. S/p laparoscopic repair of internal hernia, no bowel resected. Pain well controlled. Tolerating CLD with no N/V.    Medications:  Continuous Infusions:   lactated ringers 100 mL/hr at 05/18/23 1554     Scheduled Meds:   acetaminophen  1,000 mg Intravenous Q8H    enoxparin  40 mg Subcutaneous Daily    methocarbamol (ROBAXIN) IVPB  500 mg Intravenous Q8H    pantoprazole  40 mg Intravenous Daily    piperacillin-tazobactam (ZOSYN) IVPB  4.5 g Intravenous Q8H    polyethylene glycol  17 g Oral Daily     PRN Meds:LIDOcaine (PF) 10 mg/ml (1%), ondansetron, oxyCODONE, oxyCODONE, QUEtiapine, sodium chloride 0.9%, traMADoL     Review of patient's allergies indicates:   Allergen Reactions    Dilaudid [hydromorphone] Itching     Objective:     Vital Signs (Most Recent):  Temp: 98 °F (36.7 °C) (05/19/23 0500)  Pulse: 82 (05/19/23 0730)  Resp: 18 (05/19/23 0726)  BP: 110/67 (05/19/23 0730)  SpO2: 99 % (05/19/23 0730) Vital Signs (24h Range):  Temp:  [97.5 °F (36.4 °C)-98.3 °F (36.8 °C)] 98 °F (36.7 °C)  Pulse:  [] 82  Resp:  [11-22] 18  SpO2:  [94 %-100 %] 99 %  BP: ()/(51-67) 110/67     Weight: 68.9 kg (152 lb)  Body mass index is 26.93 kg/m².    Intake/Output - Last 3 Shifts         05/17 0700 05/18 0659 05/18 0700  05/19 0659 05/19 0700 05/20 0659    P.O.  120     IV Piggyback  1200     Total Intake(mL/kg)  1320 (19.2)     Urine (mL/kg/hr)  300 (0.2)     Total Output  300     Net  +1020            Urine Occurrence  1 x              Physical Exam  Vitals and nursing note reviewed.   Constitutional:       General: She is not in acute distress.     Appearance: Normal appearance.   HENT:      Head: Normocephalic and atraumatic.   Cardiovascular:      Rate and Rhythm: Normal rate and regular rhythm.   Pulmonary:      Effort: Pulmonary effort is normal. No respiratory distress.   Abdominal:      General: Abdomen is flat.      Palpations: Abdomen is soft. There is no mass.      Hernia: No hernia  is present.      Comments: Appropriately tender to palpation, port site incisions c/d/i  Soft, nondistended   Musculoskeletal:      Right lower leg: No edema.      Left lower leg: No edema.   Skin:     General: Skin is warm and dry.   Neurological:      General: No focal deficit present.      Mental Status: She is alert and oriented to person, place, and time.   Psychiatric:         Mood and Affect: Mood normal.         Behavior: Behavior normal.        Significant Labs:  I have reviewed all pertinent lab results within the past 24 hours.    Significant Diagnostics:  I have reviewed all pertinent imaging results/findings within the past 24 hours.

## 2023-05-19 NOTE — NURSING
Paged on-call for surgery per patient request for Seroquel patient takes at night, at home. On-call resident states she will confer with attending.

## 2023-05-19 NOTE — PROGRESS NOTES
[facility]  General Surgery  Progress Note    Subjective:     History of Present Illness:  Sarah Padron is a 43 y.o. female with PMHx of obesity s/p lap band in 2008, lap band removal with conversion to bypass in 2014, ex lap with reduction of internal hernia in 2016 who presents as transfer for evaluation of an internal hernia. 4 days ago she developed epigastric pain after eating eggplant parmesan. This pain got progressively worse prompting her visit to an outside ED yesterday. She notes nausea and dry heaving, but no vomiting. Her last BM was yesterday, denies blood in stool, diarrhea, constipation. No fevers at home. She states her pain has now almost completely resolved after receiving a dose of Morphine.   She is hemodynamically stable with HR 70s to 80s, sBP 110s to 120s. WBC within normal limits, labs unremarkable overall. CT A/P with evidence of internal hernia.             Post-Op Info:  Procedure(s) (LRB):  LAPAROSCOPY, DIAGNOSTIC (N/A)   1 Day Post-Op     Interval History: NAEON. S/p laparoscopic repair of internal hernia, no bowel resected. Pain well controlled. Tolerating CLD with no N/V.    Medications:  Continuous Infusions:   lactated ringers 100 mL/hr at 05/18/23 1554     Scheduled Meds:   acetaminophen  1,000 mg Intravenous Q8H    enoxparin  40 mg Subcutaneous Daily    methocarbamol (ROBAXIN) IVPB  500 mg Intravenous Q8H    pantoprazole  40 mg Intravenous Daily    piperacillin-tazobactam (ZOSYN) IVPB  4.5 g Intravenous Q8H    polyethylene glycol  17 g Oral Daily     PRN Meds:LIDOcaine (PF) 10 mg/ml (1%), ondansetron, oxyCODONE, oxyCODONE, QUEtiapine, sodium chloride 0.9%, traMADoL     Review of patient's allergies indicates:   Allergen Reactions    Dilaudid [hydromorphone] Itching     Objective:     Vital Signs (Most Recent):  Temp: 98 °F (36.7 °C) (05/19/23 0500)  Pulse: 82 (05/19/23 0730)  Resp: 18 (05/19/23 0726)  BP: 110/67 (05/19/23 0730)  SpO2: 99 % (05/19/23  0730) Vital Signs (24h Range):  Temp:  [97.5 °F (36.4 °C)-98.3 °F (36.8 °C)] 98 °F (36.7 °C)  Pulse:  [] 82  Resp:  [11-22] 18  SpO2:  [94 %-100 %] 99 %  BP: ()/(51-67) 110/67     Weight: 68.9 kg (152 lb)  Body mass index is 26.93 kg/m².    Intake/Output - Last 3 Shifts         05/17 0700  05/18 0659 05/18 0700  05/19 0659 05/19 0700  05/20 0659    P.O.  120     IV Piggyback  1200     Total Intake(mL/kg)  1320 (19.2)     Urine (mL/kg/hr)  300 (0.2)     Total Output  300     Net  +1020            Urine Occurrence  1 x             Physical Exam  Vitals and nursing note reviewed.   Constitutional:       General: She is not in acute distress.     Appearance: Normal appearance.   HENT:      Head: Normocephalic and atraumatic.   Cardiovascular:      Rate and Rhythm: Normal rate and regular rhythm.   Pulmonary:      Effort: Pulmonary effort is normal. No respiratory distress.   Abdominal:      General: Abdomen is flat.      Palpations: Abdomen is soft. There is no mass.      Hernia: No hernia is present.      Comments: Appropriately tender to palpation, port site incisions c/d/i  Soft, nondistended   Musculoskeletal:      Right lower leg: No edema.      Left lower leg: No edema.   Skin:     General: Skin is warm and dry.   Neurological:      General: No focal deficit present.      Mental Status: She is alert and oriented to person, place, and time.   Psychiatric:         Mood and Affect: Mood normal.         Behavior: Behavior normal.        Significant Labs:  I have reviewed all pertinent lab results within the past 24 hours.    Significant Diagnostics:  I have reviewed all pertinent imaging results/findings within the past 24 hours.    Assessment/Plan:     * Internal hernia  43 y.o. female with PMHx of obesity s/p lap band in 2008, lap band removal with conversion to bypass in 2014, ex lap with reduction of internal hernia in 2016 who presents as transfer for evaluation of an internal hernia. Now s/p  laparoscopic repair of internal hernia.    - CLD  - d/c mIVF  - Pain meds scheduled and prn oxy  - Antiemetics prn  - Daily labs  - PPI  - Likely d/c to home this afternoon        Debbie De Santiago MD  General Surgery  [facility]

## 2023-05-19 NOTE — ASSESSMENT & PLAN NOTE
43 y.o. female with PMHx of obesity s/p lap band in 2008, lap band removal with conversion to bypass in 2014, ex lap with reduction of internal hernia in 2016 who presents as transfer for evaluation of an internal hernia. Now s/p laparoscopic repair of internal hernia.    - CLD  - d/c mIVF  - Pain meds scheduled and prn oxy  - Antiemetics prn  - Daily labs  - PPI  - Likely d/c to home this afternoon

## 2023-05-19 NOTE — NURSING
Pts blood pressure=87/51- HR=73, asymptomatic, MD notified, no new orders given will continue to monitor.

## 2023-07-04 ENCOUNTER — HOSPITAL ENCOUNTER (INPATIENT)
Facility: HOSPITAL | Age: 43
LOS: 2 days | Discharge: HOME | DRG: 331 | End: 2023-07-06
Attending: EMERGENCY MEDICINE | Admitting: SURGERY
Payer: COMMERCIAL

## 2023-07-04 DIAGNOSIS — K56.1: Primary | ICD-10-CM

## 2023-07-04 LAB
ALBUMIN SERPL BCP-MCNC: 4.2 G/DL (ref 3.5–5.2)
ALP SERPL-CCNC: 60 U/L (ref 55–135)
ALT SERPL W/O P-5'-P-CCNC: 60 U/L (ref 10–44)
ANION GAP SERPL CALC-SCNC: 13 MMOL/L (ref 8–16)
AST SERPL-CCNC: 231 U/L (ref 10–40)
B-HCG UR QL: NEGATIVE
BASOPHILS # BLD AUTO: 0.11 K/UL (ref 0–0.2)
BASOPHILS NFR BLD: 1 % (ref 0–1.9)
BILIRUB SERPL-MCNC: 0.3 MG/DL (ref 0.1–1)
BILIRUB UR QL STRIP: NEGATIVE
BUN SERPL-MCNC: 20 MG/DL (ref 6–20)
BUN SERPL-MCNC: 23 MG/DL (ref 6–30)
CALCIUM SERPL-MCNC: 9.3 MG/DL (ref 8.7–10.5)
CHLORIDE SERPL-SCNC: 108 MMOL/L (ref 95–110)
CHLORIDE SERPL-SCNC: 108 MMOL/L (ref 95–110)
CLARITY UR REFRACT.AUTO: CLEAR
CO2 SERPL-SCNC: 19 MMOL/L (ref 23–29)
COLOR UR AUTO: COLORLESS
CREAT SERPL-MCNC: 0.7 MG/DL (ref 0.5–1.4)
CREAT SERPL-MCNC: 0.9 MG/DL (ref 0.5–1.4)
CTP QC/QA: YES
DIFFERENTIAL METHOD: ABNORMAL
EOSINOPHIL # BLD AUTO: 0.2 K/UL (ref 0–0.5)
EOSINOPHIL NFR BLD: 1.6 % (ref 0–8)
ERYTHROCYTE [DISTWIDTH] IN BLOOD BY AUTOMATED COUNT: 17.8 % (ref 11.5–14.5)
EST. GFR  (NO RACE VARIABLE): >60 ML/MIN/1.73 M^2
GLUCOSE SERPL-MCNC: 55 MG/DL (ref 70–110)
GLUCOSE SERPL-MCNC: 59 MG/DL (ref 70–110)
GLUCOSE UR QL STRIP: NEGATIVE
HCT VFR BLD AUTO: 33.5 % (ref 37–48.5)
HCT VFR BLD CALC: 36 %PCV (ref 36–54)
HGB BLD-MCNC: 9.9 G/DL (ref 12–16)
HGB UR QL STRIP: NEGATIVE
IMM GRANULOCYTES # BLD AUTO: 0.02 K/UL (ref 0–0.04)
IMM GRANULOCYTES NFR BLD AUTO: 0.2 % (ref 0–0.5)
KETONES UR QL STRIP: NEGATIVE
LACTATE SERPL-SCNC: 1.9 MMOL/L (ref 0.5–2.2)
LEUKOCYTE ESTERASE UR QL STRIP: NEGATIVE
LIPASE SERPL-CCNC: 28 U/L (ref 4–60)
LYMPHOCYTES # BLD AUTO: 3.7 K/UL (ref 1–4.8)
LYMPHOCYTES NFR BLD: 35.2 % (ref 18–48)
MCH RBC QN AUTO: 22.3 PG (ref 27–31)
MCHC RBC AUTO-ENTMCNC: 29.6 G/DL (ref 32–36)
MCV RBC AUTO: 76 FL (ref 82–98)
MONOCYTES # BLD AUTO: 0.9 K/UL (ref 0.3–1)
MONOCYTES NFR BLD: 8.5 % (ref 4–15)
NEUTROPHILS # BLD AUTO: 5.7 K/UL (ref 1.8–7.7)
NEUTROPHILS NFR BLD: 53.5 % (ref 38–73)
NITRITE UR QL STRIP: NEGATIVE
NRBC BLD-RTO: 0 /100 WBC
PH UR STRIP: 5 [PH] (ref 5–8)
PLATELET # BLD AUTO: 361 K/UL (ref 150–450)
PMV BLD AUTO: 9.5 FL (ref 9.2–12.9)
POC IONIZED CALCIUM: 1.11 MMOL/L (ref 1.06–1.42)
POC TCO2 (MEASURED): 19 MMOL/L (ref 23–29)
POTASSIUM BLD-SCNC: 3.8 MMOL/L (ref 3.5–5.1)
POTASSIUM SERPL-SCNC: 3.7 MMOL/L (ref 3.5–5.1)
PROT SERPL-MCNC: 7.8 G/DL (ref 6–8.4)
PROT UR QL STRIP: NEGATIVE
RBC # BLD AUTO: 4.43 M/UL (ref 4–5.4)
SAMPLE: ABNORMAL
SODIUM BLD-SCNC: 139 MMOL/L (ref 136–145)
SODIUM SERPL-SCNC: 140 MMOL/L (ref 136–145)
SP GR UR STRIP: 1 (ref 1–1.03)
URN SPEC COLLECT METH UR: ABNORMAL
WBC # BLD AUTO: 10.62 K/UL (ref 3.9–12.7)

## 2023-07-04 PROCEDURE — 80047 BASIC METABLC PNL IONIZED CA: CPT

## 2023-07-04 PROCEDURE — 81003 URINALYSIS AUTO W/O SCOPE: CPT

## 2023-07-04 PROCEDURE — 99285 EMERGENCY DEPT VISIT HI MDM: CPT | Mod: 25

## 2023-07-04 PROCEDURE — 83605 ASSAY OF LACTIC ACID: CPT

## 2023-07-04 PROCEDURE — 96374 THER/PROPH/DIAG INJ IV PUSH: CPT

## 2023-07-04 PROCEDURE — 94761 N-INVAS EAR/PLS OXIMETRY MLT: CPT

## 2023-07-04 PROCEDURE — 83690 ASSAY OF LIPASE: CPT

## 2023-07-04 PROCEDURE — 81025 URINE PREGNANCY TEST: CPT

## 2023-07-04 PROCEDURE — 12000002 HC ACUTE/MED SURGE SEMI-PRIVATE ROOM

## 2023-07-04 PROCEDURE — 80053 COMPREHEN METABOLIC PANEL: CPT

## 2023-07-04 PROCEDURE — 25500020 PHARM REV CODE 255: Performed by: EMERGENCY MEDICINE

## 2023-07-04 PROCEDURE — 96361 HYDRATE IV INFUSION ADD-ON: CPT

## 2023-07-04 PROCEDURE — 63600175 PHARM REV CODE 636 W HCPCS

## 2023-07-04 PROCEDURE — 63600175 PHARM REV CODE 636 W HCPCS: Performed by: STUDENT IN AN ORGANIZED HEALTH CARE EDUCATION/TRAINING PROGRAM

## 2023-07-04 PROCEDURE — 85025 COMPLETE CBC W/AUTO DIFF WBC: CPT

## 2023-07-04 PROCEDURE — 96375 TX/PRO/DX INJ NEW DRUG ADDON: CPT

## 2023-07-04 RX ORDER — SODIUM CHLORIDE 0.9 % (FLUSH) 0.9 %
10 SYRINGE (ML) INJECTION
Status: DISCONTINUED | OUTPATIENT
Start: 2023-07-04 | End: 2023-07-06 | Stop reason: HOSPADM

## 2023-07-04 RX ORDER — DIPHENHYDRAMINE HYDROCHLORIDE 50 MG/ML
12.5 INJECTION INTRAMUSCULAR; INTRAVENOUS
Status: COMPLETED | OUTPATIENT
Start: 2023-07-04 | End: 2023-07-04

## 2023-07-04 RX ORDER — HYDROMORPHONE HYDROCHLORIDE 1 MG/ML
0.5 INJECTION, SOLUTION INTRAMUSCULAR; INTRAVENOUS; SUBCUTANEOUS EVERY 6 HOURS PRN
Status: DISCONTINUED | OUTPATIENT
Start: 2023-07-04 | End: 2023-07-05

## 2023-07-04 RX ORDER — ACETAMINOPHEN 10 MG/ML
1000 INJECTION, SOLUTION INTRAVENOUS EVERY 8 HOURS
Status: COMPLETED | OUTPATIENT
Start: 2023-07-04 | End: 2023-07-05

## 2023-07-04 RX ORDER — MORPHINE SULFATE 2 MG/ML
6 INJECTION, SOLUTION INTRAMUSCULAR; INTRAVENOUS
Status: COMPLETED | OUTPATIENT
Start: 2023-07-04 | End: 2023-07-04

## 2023-07-04 RX ORDER — AMITRIPTYLINE HYDROCHLORIDE 25 MG/1
25 TABLET, FILM COATED ORAL NIGHTLY PRN
Status: DISCONTINUED | OUTPATIENT
Start: 2023-07-04 | End: 2023-07-06 | Stop reason: HOSPADM

## 2023-07-04 RX ORDER — LIDOCAINE HYDROCHLORIDE 10 MG/ML
1 INJECTION, SOLUTION EPIDURAL; INFILTRATION; INTRACAUDAL; PERINEURAL ONCE AS NEEDED
Status: DISCONTINUED | OUTPATIENT
Start: 2023-07-04 | End: 2023-07-06 | Stop reason: HOSPADM

## 2023-07-04 RX ORDER — PANTOPRAZOLE SODIUM 40 MG/10ML
40 INJECTION, POWDER, LYOPHILIZED, FOR SOLUTION INTRAVENOUS DAILY
Status: DISCONTINUED | OUTPATIENT
Start: 2023-07-05 | End: 2023-07-06

## 2023-07-04 RX ORDER — QUETIAPINE FUMARATE 25 MG/1
50 TABLET, FILM COATED ORAL NIGHTLY PRN
Status: DISCONTINUED | OUTPATIENT
Start: 2023-07-04 | End: 2023-07-06 | Stop reason: HOSPADM

## 2023-07-04 RX ORDER — DEXTROSE MONOHYDRATE AND SODIUM CHLORIDE 5; .9 G/100ML; G/100ML
1000 INJECTION, SOLUTION INTRAVENOUS
Status: COMPLETED | OUTPATIENT
Start: 2023-07-04 | End: 2023-07-04

## 2023-07-04 RX ORDER — ONDANSETRON 2 MG/ML
4 INJECTION INTRAMUSCULAR; INTRAVENOUS ONCE
Status: COMPLETED | OUTPATIENT
Start: 2023-07-04 | End: 2023-07-04

## 2023-07-04 RX ORDER — DEXTROSE, SODIUM CHLORIDE, SODIUM LACTATE, POTASSIUM CHLORIDE, AND CALCIUM CHLORIDE 5; .6; .31; .03; .02 G/100ML; G/100ML; G/100ML; G/100ML; G/100ML
INJECTION, SOLUTION INTRAVENOUS CONTINUOUS
Status: DISCONTINUED | OUTPATIENT
Start: 2023-07-04 | End: 2023-07-05

## 2023-07-04 RX ORDER — HYDROMORPHONE HYDROCHLORIDE 1 MG/ML
0.2 INJECTION, SOLUTION INTRAMUSCULAR; INTRAVENOUS; SUBCUTANEOUS EVERY 6 HOURS PRN
Status: DISCONTINUED | OUTPATIENT
Start: 2023-07-04 | End: 2023-07-05

## 2023-07-04 RX ADMIN — DIPHENHYDRAMINE HYDROCHLORIDE 12.5 MG: 50 INJECTION, SOLUTION INTRAMUSCULAR; INTRAVENOUS at 06:07

## 2023-07-04 RX ADMIN — DEXTROSE AND SODIUM CHLORIDE 1000 ML: 5; 900 INJECTION, SOLUTION INTRAVENOUS at 09:07

## 2023-07-04 RX ADMIN — IOHEXOL 75 ML: 350 INJECTION, SOLUTION INTRAVENOUS at 07:07

## 2023-07-04 RX ADMIN — MORPHINE SULFATE 6 MG: 2 INJECTION, SOLUTION INTRAMUSCULAR; INTRAVENOUS at 06:07

## 2023-07-04 RX ADMIN — ONDANSETRON 4 MG: 2 INJECTION INTRAMUSCULAR; INTRAVENOUS at 06:07

## 2023-07-04 RX ADMIN — SODIUM CHLORIDE, SODIUM LACTATE, POTASSIUM CHLORIDE, CALCIUM CHLORIDE AND DEXTROSE MONOHYDRATE: 5; 600; 310; 30; 20 INJECTION, SOLUTION INTRAVENOUS at 11:07

## 2023-07-04 NOTE — ED NOTES
I-STAT Chem-8+ Results:   Value Reference Range   Sodium 139 136-145 mmol/L   Potassium  3.8 3.5-5.1 mmol/L   Chloride 108  mmol/L   Ionized Calcium 1.11 1.06-1.42 mmol/L   CO2 (measured) 19 23-29 mmol/L   Glucose 59  mg/dL   BUN 23 6-30 mg/dL   Creatinine 0.9 0.5-1.4 mg/dL   Hematocrit 36 36-54%

## 2023-07-04 NOTE — Clinical Note
Diagnosis: Enteric intussusception [[id number]]   Future Attending Provider: LALA DUCKWORTH JR [[id number]]   Admitting Provider:: LALA DUCKWORTH JR [[id number]]

## 2023-07-04 NOTE — ED PROVIDER NOTES
Encounter Date: 7/4/2023       History     Chief Complaint   Patient presents with    Abdominal Pain     Gastric bypass 2014, revision 6 mos ago, sm intestine was trapped in bet stomach     Sarah Padron is a 43 y.o. female with PMHx of obesity s/p lap band in 2008, lap band removal with conversion to bypass in 2014, ex lap with reduction of internal hernia in 2016 who presents to the ED regarding epigastric abdominal pain onset 3 hours ago. One episode of dark brown emesis since arriving to the ED. She had internal hernia repaired here 5/18 with no complications since then. States it feels similar to previous ED visit. Denies diarrhea, constipation, fever, chest pain, shortness of breath, or any other complaints at this time.     The history is provided by the patient and medical records.   Review of patient's allergies indicates:   Allergen Reactions    Dilaudid [hydromorphone] Itching    Demerol [meperidine] Nausea And Vomiting     History reviewed. No pertinent past medical history.  Past Surgical History:   Procedure Laterality Date    ABDOMINAL SURGERY      BREAST SURGERY      lift    CHOLECYSTECTOMY      DIAGNOSTIC LAPAROSCOPY N/A 5/18/2023    Procedure: LAPAROSCOPY, DIAGNOSTIC;  Surgeon: Davy Barnes MD;  Location: [facility] OR [address] [place] [address];  Service: General;  Laterality: N/A;  REPAIR INTERNAL HERNIA    EYE SURGERY Right     cataract    GASTRIC BYPASS      HERNIA REPAIR N/A 2016    internal    lap band removal  7/9/14    LAPAROSCOPIC GASTRIC BANDING      LAPAROSCOPIC GASTRIC BANDING  2008    revision     Family History   Problem Relation Age of Onset    Hypertension Mother     Hypertension Father     Obesity Father      Social History     Tobacco Use    Smoking status: Never    Smokeless tobacco: Never   Substance Use Topics    Alcohol use: Yes     Comment: ocassional; wine    Drug use: No     Review of Systems   Constitutional:  Negative for fever.   HENT:  Negative for sore throat.     Respiratory:  Negative for shortness of breath.    Cardiovascular:  Negative for chest pain.   Gastrointestinal:  Positive for abdominal pain, nausea and vomiting.   Genitourinary:  Negative for dysuria.   Musculoskeletal:  Negative for back pain.   Skin:  Negative for rash.   Neurological:  Negative for weakness.   Hematological:  Does not bruise/bleed easily.     Physical Exam     Initial Vitals [07/04/23 1812]   BP Pulse Resp Temp SpO2   127/72 108 18 98.6 °F (37 °C) 97 %      MAP       --         Physical Exam    Vitals reviewed.  Constitutional: She appears well-developed and well-nourished. She is diaphoretic. She appears distressed.   Actively vomiting and diaphoretic, mildly in distress   HENT:   Head: Normocephalic and atraumatic.   Neck: Neck supple.   Cardiovascular:  Normal rate, regular rhythm and normal heart sounds.     Exam reveals no gallop and no friction rub.       No murmur heard.  Pulmonary/Chest: Breath sounds normal. She has no wheezes. She has no rhonchi. She has no rales.   Abdominal: Abdomen is soft and flat. There is no abdominal tenderness. There is no rebound and no guarding.   Musculoskeletal:      Cervical back: Neck supple.     Neurological: She is alert and oriented to person, place, and time.   Psychiatric: She has a normal mood and affect.       ED Course   Procedures  Labs Reviewed   CBC W/ AUTO DIFFERENTIAL - Abnormal; Notable for the following components:       Result Value    Hemoglobin 9.9 (*)     Hematocrit 33.5 (*)     MCV 76 (*)     MCH 22.3 (*)     MCHC 29.6 (*)     RDW 17.8 (*)     All other components within normal limits   COMPREHENSIVE METABOLIC PANEL - Abnormal; Notable for the following components:    CO2 19 (*)     Glucose 55 (*)      (*)     ALT 60 (*)     All other components within normal limits   URINALYSIS, REFLEX TO URINE CULTURE - Abnormal; Notable for the following components:    Color, UA Colorless (*)     All other components within normal limits     Narrative:     Specimen Source->Urine   ISTAT PROCEDURE - Abnormal; Notable for the following components:    POC Glucose 59 (*)     POC TCO2 (MEASURED) 19 (*)     All other components within normal limits   LIPASE   LACTIC ACID, PLASMA   POCT URINE PREGNANCY   ISTAT CHEM8          Imaging Results              CT Abdomen Pelvis With Contrast (Final result)  Result time 07/04/23 19:59:31      Final result by Christopher Driscoll MD (07/04/23 19:59:31)                   Impression:      1. Suspected newly developed short-segment small bowel into small bowel intussusception in the left mid abdomen near the small bowel anastomosis site with mild upstream dilatation; however, no convincing CT evidence of high-grade bowel obstruction at this time.  Further evaluation/follow-up as warranted.  2. Previous whirling of the SMA and SMV/whirl sign at the root of the mesentery is no longer identified.  3. Previous gastric bypass and cholecystectomy with grossly stable prominence of the intrahepatic and common bile ducts.  4. Grossly stable additional chronic findings as above.      Electronically signed by: Christopher Driscoll MD  Date:    07/04/2023  Time:    19:59               Narrative:    EXAMINATION:  CT ABDOMEN PELVIS WITH CONTRAST    CLINICAL HISTORY:  Abdominal pain, acute, nonlocalized;    TECHNIQUE:  Low dose axial images, sagittal and coronal reformations were obtained from the lung bases to the pubic symphysis following the IV administration of 75 mL of Omnipaque 350 .  Oral contrast was not given.    COMPARISON:  CT abdomen and pelvis 05/17/2023, MRI abdomen 02/03/2016, abdominal ultrasound 02/03/2016    FINDINGS:  Partially imaged bilateral breast implants.  Imaged lung bases are clear.  Base of the heart is within normal limits.    Patient is status post gastric bypass with left abdominal small bowel anastomosis.  Stomach and duodenum are otherwise without significant abnormality.    There is suspected short-segment small  bowel into small bowel intussusception about the region of the left mid abdominal small bowel anastomosis with mostly decompressed bowel loops distal to this and new from prior.  Remainder of the small bowel and large bowel loops are within normal limits.  No CT evidence of high-grade bowel obstruction at this time.  Appendix and terminal ileum are within normal limits.    Previous swirling of the SMA and SMV is no longer identified.  Interval resolution of previous soft tissue stranding throughout the mesenteric fat.    Appendix and terminal ileum are within normal limits.  No pneumatosis or portal venous gas.    Remote cholecystectomy.  Stable prominence of the intrahepatic and common bile ducts.  Liver and spleen are normal in size.  Stable subcentimeter hypoattenuating few parenchymal foci within the liver which are too small to characterize.  Pancreas and bilateral adrenal glands are within normal limits.    No ascites or free air.  No lymphadenopathy by CT criteria.  No significant atherosclerosis.  No aortic aneurysm or dissection.    Bilateral kidneys are normal in size, shape and location with symmetric normal enhancement.  No hydronephrosis or significant perinephric stranding.  Ureters are nondilated.  Urinary bladder is well distended without wall thickening.  Uterus is somewhat anteflexed.  Few small follicles at each adnexa.  Trace volume nonspecific free fluid in the cul-de-sac, commonly physiologic in this age group.  Few scattered punctate pelvic phleboliths noted.    Extraperitoneal soft tissues are similar to prior.  Osseous structures appear stable without acute process seen.                                       Medications   ondansetron injection 4 mg (4 mg Intravenous Given 7/4/23 1837)   morphine injection 6 mg (6 mg Intravenous Given 7/4/23 1837)   diphenhydrAMINE injection 12.5 mg (12.5 mg Intravenous Given 7/4/23 1837)   iohexoL (OMNIPAQUE 350) injection 75 mL (75 mLs Intravenous Given  7/4/23 1940)   dextrose 5 % and 0.9 % NaCl infusion (1,000 mLs Intravenous New Bag 7/4/23 2118)     Medical Decision Making:   History:   Old Medical Records: I decided to obtain old medical records.  Old Records Summarized: records from previous admission(s).       <> Summary of Records:  Patient had a CT scan at the outside hospital showing interval development of whirl sign at the root of the mesentery.  Initial Assessment:   Sarah Padron is a 43 y.o. female with PMHx of obesity s/p lap band in 2008, lap band removal with conversion to bypass in 2014, ex lap with reduction of internal hernia in 2016 and 5/18/23 who presents to ED for acute epigastric abdominal pain onset 3 hours. Dark brown emesis. Vitals WNL. Appears diaphoretic and mildly distressed. Abdomen soft, nontender. Pain appears out of proportion to exam. Concered for recurrent internal hernia or mesenteric ischemia.   Differential Diagnosis:   My differential diagnoses include but are not limited to:   Internal hernia, mesenteric ischemia, gastritis, SBO, perforation, sepsis though unlikely with vitals WNL  Clinical Tests:   Lab Tests: Ordered and Reviewed  The following lab test(s) were unremarkable: Urinalysis, Lipase, Lactate and CBC       <> Summary of Lab: Elevated LFTs  Radiological Study: Ordered and Reviewed  ED Management:  CT showed suspected newly developed short-segment small bowel into small bowel intussusception in the left mid abdomen near the small bowel anastomosis site with mild upstream dilatation; however, no convincing CT evidence of high-grade bowel obstruction at this time. Consulted general surgery. Patient educated on findings. General surgery will admit to their service for observation.  I have reviewed the patient's records and discussed with my supervising physician.           ED Course as of 07/04/23 2201 Tue Jul 04, 2023 1858 Preg Test, Ur: Negative [KB]   1858 Hemoglobin(!): 9.9  Baseline [KB]   1937  Lactate, Mc: 1.9 [KB]   1938 ALT(!): 60 [KB]   1938 AST(!): 231 [KB]   1938 Lipase: 28 [KB]   2017 CT Abdomen Pelvis With Contrast  1. Suspected newly developed short-segment small bowel into small bowel intussusception in the left mid abdomen near the small bowel anastomosis site with mild upstream dilatation; however, no convincing CT evidence of high-grade bowel obstruction at this time.  Further evaluation/follow-up as warranted.  2. Previous whirling of the SMA and SMV/whirl sign at the root of the mesentery is no longer identified.  3. Previous gastric bypass and cholecystectomy with grossly stable prominence of the intrahepatic and common bile ducts.  4. Grossly stable additional chronic findings as above. [KB]   2034 Glucose(!): 55  Will give D5 [KB]   2040 Consulted general surgery who is coming to see patient [KB]      ED Course User Index  [KB] Karrie Stone PA-C                 Clinical Impression:   Final diagnoses:  [K56.1] Enteric intussusception (Primary)               Karrie Stone PA-C  07/04/23 6418

## 2023-07-04 NOTE — ED TRIAGE NOTES
Gastric bypass 2014, revision 6 mos ago, sm intestine was trapped in bet stomach, pt complaint of pain, pt vomiting coffee ground .

## 2023-07-05 ENCOUNTER — ANESTHESIA (OUTPATIENT)
Dept: SURGERY | Facility: HOSPITAL | Age: 43
DRG: 331 | End: 2023-07-05
Payer: COMMERCIAL

## 2023-07-05 ENCOUNTER — ANESTHESIA EVENT (OUTPATIENT)
Dept: SURGERY | Facility: HOSPITAL | Age: 43
DRG: 331 | End: 2023-07-05
Payer: COMMERCIAL

## 2023-07-05 LAB
ALBUMIN SERPL BCP-MCNC: 3.2 G/DL (ref 3.5–5.2)
ALP SERPL-CCNC: 64 U/L (ref 55–135)
ALT SERPL W/O P-5'-P-CCNC: 84 U/L (ref 10–44)
ANION GAP SERPL CALC-SCNC: 6 MMOL/L (ref 8–16)
AST SERPL-CCNC: 147 U/L (ref 10–40)
BASOPHILS # BLD AUTO: 0.05 K/UL (ref 0–0.2)
BASOPHILS NFR BLD: 0.7 % (ref 0–1.9)
BILIRUB SERPL-MCNC: 0.4 MG/DL (ref 0.1–1)
BUN SERPL-MCNC: 14 MG/DL (ref 6–20)
CALCIUM SERPL-MCNC: 8.2 MG/DL (ref 8.7–10.5)
CHLORIDE SERPL-SCNC: 108 MMOL/L (ref 95–110)
CO2 SERPL-SCNC: 26 MMOL/L (ref 23–29)
CREAT SERPL-MCNC: 0.6 MG/DL (ref 0.5–1.4)
DIFFERENTIAL METHOD: ABNORMAL
EOSINOPHIL # BLD AUTO: 0.2 K/UL (ref 0–0.5)
EOSINOPHIL NFR BLD: 3 % (ref 0–8)
ERYTHROCYTE [DISTWIDTH] IN BLOOD BY AUTOMATED COUNT: 17.5 % (ref 11.5–14.5)
EST. GFR  (NO RACE VARIABLE): >60 ML/MIN/1.73 M^2
GLUCOSE SERPL-MCNC: 86 MG/DL (ref 70–110)
HCT VFR BLD AUTO: 29.8 % (ref 37–48.5)
HGB BLD-MCNC: 8.6 G/DL (ref 12–16)
IMM GRANULOCYTES # BLD AUTO: 0.03 K/UL (ref 0–0.04)
IMM GRANULOCYTES NFR BLD AUTO: 0.4 % (ref 0–0.5)
LYMPHOCYTES # BLD AUTO: 1.3 K/UL (ref 1–4.8)
LYMPHOCYTES NFR BLD: 17.1 % (ref 18–48)
MAGNESIUM SERPL-MCNC: 1.8 MG/DL (ref 1.6–2.6)
MCH RBC QN AUTO: 21.8 PG (ref 27–31)
MCHC RBC AUTO-ENTMCNC: 28.9 G/DL (ref 32–36)
MCV RBC AUTO: 75 FL (ref 82–98)
MONOCYTES # BLD AUTO: 0.7 K/UL (ref 0.3–1)
MONOCYTES NFR BLD: 10 % (ref 4–15)
NEUTROPHILS # BLD AUTO: 5.1 K/UL (ref 1.8–7.7)
NEUTROPHILS NFR BLD: 68.8 % (ref 38–73)
NRBC BLD-RTO: 0 /100 WBC
PHOSPHATE SERPL-MCNC: 3.5 MG/DL (ref 2.7–4.5)
PLATELET # BLD AUTO: 278 K/UL (ref 150–450)
PMV BLD AUTO: 9.5 FL (ref 9.2–12.9)
POCT GLUCOSE: 89 MG/DL (ref 70–110)
POTASSIUM SERPL-SCNC: 3.9 MMOL/L (ref 3.5–5.1)
PROT SERPL-MCNC: 6 G/DL (ref 6–8.4)
RBC # BLD AUTO: 3.95 M/UL (ref 4–5.4)
SODIUM SERPL-SCNC: 140 MMOL/L (ref 136–145)
WBC # BLD AUTO: 7.43 K/UL (ref 3.9–12.7)

## 2023-07-05 PROCEDURE — 36000709 HC OR TIME LEV III EA ADD 15 MIN: Performed by: SURGERY

## 2023-07-05 PROCEDURE — 27201423 OPTIME MED/SURG SUP & DEVICES STERILE SUPPLY: Performed by: SURGERY

## 2023-07-05 PROCEDURE — 63600175 PHARM REV CODE 636 W HCPCS

## 2023-07-05 PROCEDURE — D9220A PRA ANESTHESIA: ICD-10-PCS | Mod: ,,, | Performed by: ANESTHESIOLOGY

## 2023-07-05 PROCEDURE — 25000003 PHARM REV CODE 250

## 2023-07-05 PROCEDURE — 36000708 HC OR TIME LEV III 1ST 15 MIN: Performed by: SURGERY

## 2023-07-05 PROCEDURE — 80053 COMPREHEN METABOLIC PANEL: CPT | Performed by: STUDENT IN AN ORGANIZED HEALTH CARE EDUCATION/TRAINING PROGRAM

## 2023-07-05 PROCEDURE — 63600175 PHARM REV CODE 636 W HCPCS: Performed by: STUDENT IN AN ORGANIZED HEALTH CARE EDUCATION/TRAINING PROGRAM

## 2023-07-05 PROCEDURE — 37000009 HC ANESTHESIA EA ADD 15 MINS: Performed by: SURGERY

## 2023-07-05 PROCEDURE — 83735 ASSAY OF MAGNESIUM: CPT | Performed by: STUDENT IN AN ORGANIZED HEALTH CARE EDUCATION/TRAINING PROGRAM

## 2023-07-05 PROCEDURE — 84100 ASSAY OF PHOSPHORUS: CPT | Performed by: STUDENT IN AN ORGANIZED HEALTH CARE EDUCATION/TRAINING PROGRAM

## 2023-07-05 PROCEDURE — 71000015 HC POSTOP RECOV 1ST HR: Performed by: SURGERY

## 2023-07-05 PROCEDURE — 44238 UNLISTED LAPS PX INTESTINE: CPT | Mod: ,,, | Performed by: SURGERY

## 2023-07-05 PROCEDURE — 36415 COLL VENOUS BLD VENIPUNCTURE: CPT | Performed by: STUDENT IN AN ORGANIZED HEALTH CARE EDUCATION/TRAINING PROGRAM

## 2023-07-05 PROCEDURE — 44238 PR LAPAROSCOPIC ENTEROENTEROSTOMY: ICD-10-PCS | Mod: ,,, | Performed by: SURGERY

## 2023-07-05 PROCEDURE — 71000033 HC RECOVERY, INTIAL HOUR: Performed by: SURGERY

## 2023-07-05 PROCEDURE — 94761 N-INVAS EAR/PLS OXIMETRY MLT: CPT

## 2023-07-05 PROCEDURE — 37000008 HC ANESTHESIA 1ST 15 MINUTES: Performed by: SURGERY

## 2023-07-05 PROCEDURE — 11000001 HC ACUTE MED/SURG PRIVATE ROOM

## 2023-07-05 PROCEDURE — 85025 COMPLETE CBC W/AUTO DIFF WBC: CPT | Performed by: STUDENT IN AN ORGANIZED HEALTH CARE EDUCATION/TRAINING PROGRAM

## 2023-07-05 PROCEDURE — C9113 INJ PANTOPRAZOLE SODIUM, VIA: HCPCS | Performed by: STUDENT IN AN ORGANIZED HEALTH CARE EDUCATION/TRAINING PROGRAM

## 2023-07-05 PROCEDURE — D9220A PRA ANESTHESIA: Mod: ,,, | Performed by: ANESTHESIOLOGY

## 2023-07-05 RX ORDER — HYDROMORPHONE HYDROCHLORIDE 1 MG/ML
0.5 INJECTION, SOLUTION INTRAMUSCULAR; INTRAVENOUS; SUBCUTANEOUS
Status: DISCONTINUED | OUTPATIENT
Start: 2023-07-05 | End: 2023-07-06 | Stop reason: HOSPADM

## 2023-07-05 RX ORDER — FENTANYL CITRATE 50 UG/ML
INJECTION, SOLUTION INTRAMUSCULAR; INTRAVENOUS
Status: DISCONTINUED | OUTPATIENT
Start: 2023-07-05 | End: 2023-07-05

## 2023-07-05 RX ORDER — PROPOFOL 10 MG/ML
VIAL (ML) INTRAVENOUS
Status: DISCONTINUED | OUTPATIENT
Start: 2023-07-05 | End: 2023-07-05

## 2023-07-05 RX ORDER — KETAMINE HCL IN 0.9 % NACL 50 MG/5 ML
SYRINGE (ML) INTRAVENOUS
Status: DISCONTINUED | OUTPATIENT
Start: 2023-07-05 | End: 2023-07-05

## 2023-07-05 RX ORDER — OXYCODONE HYDROCHLORIDE 5 MG/1
5 TABLET ORAL EVERY 4 HOURS PRN
Status: DISCONTINUED | OUTPATIENT
Start: 2023-07-05 | End: 2023-07-06

## 2023-07-05 RX ORDER — MIDAZOLAM HYDROCHLORIDE 1 MG/ML
INJECTION, SOLUTION INTRAMUSCULAR; INTRAVENOUS
Status: DISCONTINUED | OUTPATIENT
Start: 2023-07-05 | End: 2023-07-05

## 2023-07-05 RX ORDER — FENTANYL CITRATE 50 UG/ML
25 INJECTION, SOLUTION INTRAMUSCULAR; INTRAVENOUS EVERY 5 MIN PRN
Status: DISCONTINUED | OUTPATIENT
Start: 2023-07-05 | End: 2023-07-05 | Stop reason: HOSPADM

## 2023-07-05 RX ORDER — CEFAZOLIN SODIUM 1 G/3ML
INJECTION, POWDER, FOR SOLUTION INTRAMUSCULAR; INTRAVENOUS
Status: DISCONTINUED | OUTPATIENT
Start: 2023-07-05 | End: 2023-07-05

## 2023-07-05 RX ORDER — ROCURONIUM BROMIDE 10 MG/ML
INJECTION, SOLUTION INTRAVENOUS
Status: DISCONTINUED | OUTPATIENT
Start: 2023-07-05 | End: 2023-07-05

## 2023-07-05 RX ORDER — LIDOCAINE HYDROCHLORIDE 20 MG/ML
INJECTION, SOLUTION EPIDURAL; INFILTRATION; INTRACAUDAL; PERINEURAL
Status: DISCONTINUED | OUTPATIENT
Start: 2023-07-05 | End: 2023-07-05

## 2023-07-05 RX ORDER — ACETAMINOPHEN 500 MG
1000 TABLET ORAL EVERY 8 HOURS
Status: DISCONTINUED | OUTPATIENT
Start: 2023-07-05 | End: 2023-07-06

## 2023-07-05 RX ORDER — SODIUM CHLORIDE 0.9 % (FLUSH) 0.9 %
10 SYRINGE (ML) INJECTION
Status: DISCONTINUED | OUTPATIENT
Start: 2023-07-05 | End: 2023-07-05 | Stop reason: HOSPADM

## 2023-07-05 RX ORDER — ONDANSETRON 2 MG/ML
INJECTION INTRAMUSCULAR; INTRAVENOUS
Status: DISCONTINUED | OUTPATIENT
Start: 2023-07-05 | End: 2023-07-05

## 2023-07-05 RX ORDER — HALOPERIDOL 5 MG/ML
0.5 INJECTION INTRAMUSCULAR EVERY 10 MIN PRN
Status: DISCONTINUED | OUTPATIENT
Start: 2023-07-05 | End: 2023-07-05 | Stop reason: HOSPADM

## 2023-07-05 RX ORDER — DEXAMETHASONE SODIUM PHOSPHATE 4 MG/ML
INJECTION, SOLUTION INTRA-ARTICULAR; INTRALESIONAL; INTRAMUSCULAR; INTRAVENOUS; SOFT TISSUE
Status: DISCONTINUED | OUTPATIENT
Start: 2023-07-05 | End: 2023-07-05

## 2023-07-05 RX ADMIN — DEXAMETHASONE SODIUM PHOSPHATE 8 MG: 4 INJECTION INTRA-ARTICULAR; INTRALESIONAL; INTRAMUSCULAR; INTRAVENOUS; SOFT TISSUE at 11:07

## 2023-07-05 RX ADMIN — OXYCODONE HYDROCHLORIDE 5 MG: 5 TABLET ORAL at 04:07

## 2023-07-05 RX ADMIN — SUGAMMADEX 200 MG: 100 INJECTION, SOLUTION INTRAVENOUS at 11:07

## 2023-07-05 RX ADMIN — ACETAMINOPHEN 1000 MG: 10 INJECTION INTRAVENOUS at 01:07

## 2023-07-05 RX ADMIN — OXYCODONE HYDROCHLORIDE 5 MG: 5 TABLET ORAL at 11:07

## 2023-07-05 RX ADMIN — ROCURONIUM BROMIDE 30 MG: 10 INJECTION, SOLUTION INTRAVENOUS at 11:07

## 2023-07-05 RX ADMIN — PROPOFOL 200 MG: 10 INJECTION, EMULSION INTRAVENOUS at 11:07

## 2023-07-05 RX ADMIN — ROCURONIUM BROMIDE 20 MG: 10 INJECTION, SOLUTION INTRAVENOUS at 11:07

## 2023-07-05 RX ADMIN — PROPOFOL 20 MG: 10 INJECTION, EMULSION INTRAVENOUS at 11:07

## 2023-07-05 RX ADMIN — FENTANYL CITRATE 25 MCG: 50 INJECTION, SOLUTION INTRAMUSCULAR; INTRAVENOUS at 12:07

## 2023-07-05 RX ADMIN — ACETAMINOPHEN 1000 MG: 10 INJECTION INTRAVENOUS at 05:07

## 2023-07-05 RX ADMIN — Medication 10 MG: at 11:07

## 2023-07-05 RX ADMIN — PANTOPRAZOLE SODIUM 40 MG: 40 INJECTION, POWDER, FOR SOLUTION INTRAVENOUS at 08:07

## 2023-07-05 RX ADMIN — ACETAMINOPHEN 1000 MG: 10 INJECTION INTRAVENOUS at 12:07

## 2023-07-05 RX ADMIN — OXYCODONE HYDROCHLORIDE 5 MG: 5 TABLET ORAL at 12:07

## 2023-07-05 RX ADMIN — HYDROMORPHONE HYDROCHLORIDE 0.5 MG: 1 INJECTION, SOLUTION INTRAMUSCULAR; INTRAVENOUS; SUBCUTANEOUS at 08:07

## 2023-07-05 RX ADMIN — MIDAZOLAM 2 MG: 1 INJECTION INTRAMUSCULAR; INTRAVENOUS at 10:07

## 2023-07-05 RX ADMIN — ONDANSETRON 4 MG: 2 INJECTION INTRAMUSCULAR; INTRAVENOUS at 11:07

## 2023-07-05 RX ADMIN — FENTANYL CITRATE 75 MCG: 50 INJECTION INTRAMUSCULAR; INTRAVENOUS at 11:07

## 2023-07-05 RX ADMIN — ACETAMINOPHEN 1000 MG: 500 TABLET ORAL at 09:07

## 2023-07-05 RX ADMIN — SODIUM CHLORIDE: 9 INJECTION, SOLUTION INTRAVENOUS at 10:07

## 2023-07-05 RX ADMIN — LIDOCAINE HYDROCHLORIDE 100 MG: 20 INJECTION, SOLUTION EPIDURAL; INFILTRATION; INTRACAUDAL at 11:07

## 2023-07-05 RX ADMIN — CEFAZOLIN 2 G: 330 INJECTION, POWDER, FOR SOLUTION INTRAMUSCULAR; INTRAVENOUS at 11:07

## 2023-07-05 NOTE — ASSESSMENT & PLAN NOTE
Sarah Padron is a 43 y.o. lady with extensive surgical history including lap band, conversion to RNY gastric bypass, and internal hernia repair x2 who presents with an intestinal intussusception, suspected at her JJ     - Patient is feeling much better this morning  - given the size of her anastomosis, this is likely to recur, recommend diagnostic lap with JJ revision  - consent obtained  - NPO   - continue IVF  - transaminases downtrending  - IV pain control  - home meds  - replace lytes prn

## 2023-07-05 NOTE — ANESTHESIA POSTPROCEDURE EVALUATION
Anesthesia Post Evaluation    Patient: Sarah Padron    Procedure(s) Performed: Procedure(s) (LRB):  LAPAROSCOPY, DIAGNOSTIC with revision of JJ anastomosis (N/A)    Final Anesthesia Type: general      Patient location during evaluation: PACU  Patient participation: Yes- Able to Participate  Level of consciousness: awake and alert  Post-procedure vital signs: reviewed and stable  Pain management: adequate  Airway patency: patent    PONV status at discharge: No PONV  Anesthetic complications: no      Cardiovascular status: blood pressure returned to baseline  Respiratory status: unassisted  Hydration status: euvolemic  Follow-up not needed.          Vitals Value Taken Time   /66 07/05/23 1512   Temp 36.3 °C (97.4 °F) 07/05/23 1512   Pulse 65 07/05/23 1512   Resp 16 07/05/23 1512   SpO2 97 % 07/05/23 1512         Event Time   Out of Recovery 07/05/2023 12:45:00         Pain/Wesley Score: Pain Rating Prior to Med Admin: 5 (7/5/2023  1:15 PM)  Pain Rating Post Med Admin: 6 (7/4/2023  7:19 PM)  Wesley Score: 9 (7/5/2023 12:45 PM)

## 2023-07-05 NOTE — PLAN OF CARE
[facility]  Initial Discharge Assessment       Primary Care Provider: Margarito Neil MD    Admission Diagnosis: Enteric intussusception [K56.1]    Admission Date: 7/4/2023  Expected Discharge Date: 7/7/2023    Transition of Care Barriers: None    Payor: [facility] RESOURCES / Plan: [facility] / Product Type: Commercial /     Extended Emergency Contact Information  Primary Emergency Contact: Vikki RYAN  Mobile Phone: [phone]  Relation: Mother  Secondary Emergency Contact: Yara Lee  Mobile Phone: [phone]  Relation: Sister    Discharge Plan A: Home with family  Discharge Plan B: Home      [id number] - [place] - [address] [facility] [address] [place]  [address]  [place] [address]  Phone: [phone] Fax: [phone]      Initial Assessment (most recent)       Adult Discharge Assessment - 07/05/23 1027          Discharge Assessment    Assessment Type Discharge Planning Assessment     Confirmed/corrected address, phone number and insurance Yes     Confirmed Demographics Correct on Facesheet     Source of Information patient     Reason For Admission enteric intussusception     People in Home child(aidan), dependent     Facility Arrived From: home     Do you expect to return to your current living situation? Yes     Do you have help at home or someone to help you manage your care at home? Yes     Who are your caregiver(s) and their phone number(s)? mother vikki SOLORIO [phone] and sister Saud Lee [phone]     Prior to hospitilization cognitive status: Alert/Oriented     Current cognitive status: Alert/Oriented     Equipment Currently Used at Home none     Do you currently have service(s) that help you manage your care at home? No     Who is going to help you get home at discharge? family     How do you get to doctors appointments? family or friend will provide     Are you on dialysis? No     Do you take coumadin? No     Discharge Plan A Home  with family     Discharge Plan B Home     DME Needed Upon Discharge  other (see comments)   TBD    Transition of Care Barriers None        Financial Resource Strain    How hard is it for you to pay for the very basics like food, housing, medical care, and heating? Not very hard        Housing Stability    In the last 12 months, was there a time when you were not able to pay the mortgage or rent on time? No     In the last 12 months, was there a time when you did not have a steady place to sleep or slept in a shelter (including now)? No        Transportation Needs    In the past 12 months, has lack of transportation kept you from medical appointments or from getting medications? No     In the past 12 months, has lack of transportation kept you from meetings, work, or from getting things needed for daily living? No        Food Insecurity    Within the past 12 months, you worried that your food would run out before you got the money to buy more. Never true     Within the past 12 months, the food you bought just didn't last and you didn't have money to get more. Never true        Social Connections    In a typical week, how many times do you talk on the phone with family, friends, or neighbors? More than three times a week     How often do you get together with friends or relatives? More than three times a week                 Pt lives in a 2 story home with her children, currently uses no DME, no home O2, no HD/BT.  Pt states family will provide transportation home at time of d/c.  Will continue to follow for d/c needs.    Nohemi Breaux RN CM  Case Management  [id number]

## 2023-07-05 NOTE — HPI
Sarah Padron is a 43 y.o. lady with PMH obesity s/p lap band in 2008, lap band removal with conversion to bypass in 2014, ex lap with reduction of internal hernia in 2016, and diagnostic lap with reduction of internal hernia 5/2023 who presents today with an intussusception of her JJ anastomosis. She had 3 hours of sudden onset abdominal pain that worsened without relief. She had 1 episode of dark brown emesis during this period. No associated nausea, fevers, chills, change in bowel habits. She states her pain is similar to her last presentation of an internal hernia. In the ED, she is afebrile but tachycardic to 120, stable BP. HR responsive to fluids. Lactic 1.9, WBC 10.6, elevation in liver enzymes. She had a CT that showed concern for intussusception of her JJ anastomosis without obstructive findings on CT. Her pain improved with morphine.    Upon evaluation, she is mildly tender in her epigastrium without peritoneal signs. She reports her pain has improved. Continues to pass gas.

## 2023-07-05 NOTE — BRIEF OP NOTE
[facility]  Brief Operative Note    SUMMARY     Surgery Date: 7/5/2023     Surgeon(s) and Role:     * Davy Barnes MD - Primary     * Christopher Terry MD - Resident - Assisting        Pre-op Diagnosis:  Enteric intussusception [K56.1]    Post-op Diagnosis:  Post-Op Diagnosis Codes:     * Enteric intussusception [K56.1]    Procedure(s) (LRB):  LAPAROSCOPY, DIAGNOSTIC with revision of JJ anastomosis (N/A)    Anesthesia: General    Operative Findings: diagnostic laparoscopy with revision of JJ anastomosis. Intussusception resolved prior to the procedure    Estimated Blood Loss: minimal            Specimens:   Specimen (24h ago, onward)      None            [id number]

## 2023-07-05 NOTE — SUBJECTIVE & OBJECTIVE
Interval History: NAEO. Pain resolved, passing flatus, no BM. No nausea/vomiting. AF, HDS    Medications:  Continuous Infusions:   dextrose 5% lactated ringers 100 mL/hr at 07/04/23 2353     Scheduled Meds:   acetaminophen  1,000 mg Intravenous Q8H    pantoprazole  40 mg Intravenous Daily     PRN Meds:amitriptyline, HYDROmorphone, HYDROmorphone, LIDOcaine (PF) 10 mg/ml (1%), QUEtiapine, sodium chloride 0.9%     Review of patient's allergies indicates:   Allergen Reactions    Dilaudid [hydromorphone] Itching    Demerol [meperidine] Nausea And Vomiting     Objective:     Vital Signs (Most Recent):  Temp: 97.2 °F (36.2 °C) (07/05/23 0734)  Pulse: 71 (07/05/23 0734)  Resp: 16 (07/05/23 0828)  BP: (!) 113/53 (07/05/23 0734)  SpO2: 96 % (07/05/23 0734) Vital Signs (24h Range):  Temp:  [97.2 °F (36.2 °C)-98.7 °F (37.1 °C)] 97.2 °F (36.2 °C)  Pulse:  [] 71  Resp:  [14-20] 16  SpO2:  [94 %-100 %] 96 %  BP: (100-127)/(53-72) 113/53     Weight: 71 kg (156 lb 8.4 oz)  Body mass index is 27.73 kg/m².    Intake/Output - Last 3 Shifts         07/03 0700 07/04 0659 07/04 0700 07/05 0659 07/05 0700 07/06 0659    I.V. (mL/kg)  252.4 (3.6)     IV Piggyback  98.9     Total Intake(mL/kg)  351.3 (4.9)     Net  +351.3                     Physical Exam  Constitutional:       General: She is not in acute distress.     Appearance: Normal appearance.   HENT:      Head: Normocephalic and atraumatic.   Cardiovascular:      Rate and Rhythm: Regular rhythm. Tachycardia present.   Pulmonary:      Effort: Pulmonary effort is normal. No respiratory distress.   Abdominal:      General: Abdomen is flat. There is no distension.      Palpations: Abdomen is soft.      Comments: Well healed abdominal incisions  Tenderness improved  No distension  Not peritonitic   Neurological:      General: No focal deficit present.      Mental Status: She is alert and oriented to person, place, and time.   Psychiatric:         Behavior: Behavior normal.          Thought Content: Thought content normal.        Significant Labs:  I have reviewed all pertinent lab results within the past 24 hours.  CBC:   Recent Labs   Lab 07/05/23  0439   WBC 7.43   RBC 3.95*   HGB 8.6*   HCT 29.8*      MCV 75*   MCH 21.8*   MCHC 28.9*     CMP:   Recent Labs   Lab 07/05/23  0439   GLU 86   CALCIUM 8.2*   ALBUMIN 3.2*   PROT 6.0      K 3.9   CO2 26      BUN 14   CREATININE 0.6   ALKPHOS 64   ALT 84*   *   BILITOT 0.4       Significant Diagnostics:  I have reviewed all pertinent imaging results/findings within the past 24 hours.

## 2023-07-05 NOTE — ANESTHESIA PROCEDURE NOTES
Intubation    Date/Time: 7/5/2023 11:02 AM  Performed by: Roberta Hussein MD  Authorized by: Santnio Mullins MD     Intubation:     Induction:  Intravenous    Intubated:  Postinduction    Mask Ventilation:  Easy mask    Attempts:  1    Attempted By:  Resident anesthesiologist    Method of Intubation:  Blind intubation    Blade:  Bloom 3    Laryngeal View Grade: Grade I - full view of cords      Difficult Airway Encountered?: No      Complications:  None    Airway Device:  Oral endotracheal tube    Airway Device Size:  7.0    Style/Cuff Inflation:  Cuffed (inflated to minimal occlusive pressure)    Secured at:  The lips    Placement Verified By:  Capnometry and Revisualization with laryngoscopy    Complicating Factors:  None    Findings Post-Intubation:  BS equal bilateral

## 2023-07-05 NOTE — BRIEF OP NOTE
Operative Note       Surgery Date: 7/5/2023     Surgeon(s) and Role:     * Davy Barnes MD - Primary     * Christopher Terry MD - Resident - Assisting    Pre-op Diagnosis:  Enteric intussusception [K56.1]    Post-op Diagnosis:  Enteric intussusception [K56.1]    Procedure(s) (LRB):  LAPAROSCOPY, DIAGNOSTIC with revision of JJ anastomosis (N/A)    Anesthesia: General    Procedure in Detail/Findings:  JJ not intussuscepted at time of surgery.  JJ partially transected at prior staple line and common channel pexied to the BP limb.    Estimated Blood Loss: Minimal           Specimens (From admission, onward)      None          Implants: * No implants in log *           Disposition: PACU - hemodynamically stable.           Condition: Good    Attestation:  I was present and scrubbed for the entire procedure.

## 2023-07-05 NOTE — PROGRESS NOTES
Vital signs stable. Afebrile. Drowsy, oriented and following commands. Pain controlled with PRN meds.. Denies nausea. Tolerating sips of water and PO meds. Lap sites well approximated and without drainage. POC reviewed and understanding verbalized. Family updated via text.

## 2023-07-05 NOTE — TRANSFER OF CARE
"Anesthesia Transfer of Care Note    Patient: Sarah Padron    Procedure(s) Performed: Procedure(s) (LRB):  LAPAROSCOPY, DIAGNOSTIC with revision of JJ anastomosis (N/A)    Patient location: PACU    Anesthesia Type: general    Transport from OR: Transported from OR on 6-10 L/min O2 by face mask with adequate spontaneous ventilation    Post pain: adequate analgesia    Post assessment: no apparent anesthetic complications    Post vital signs: stable    Level of consciousness: awake and alert    Nausea/Vomiting: no nausea/vomiting    Complications: none    Transfer of care protocol was followed      Last vitals:   Visit Vitals  BP (!) 100/54 (BP Location: Right arm, Patient Position: Lying)   Pulse 77   Temp 36.5 °C (97.7 °F) (Temporal)   Resp 14   Ht 5' 3" (1.6 m)   Wt 68 kg (150 lb)   SpO2 100%   Breastfeeding No   BMI 26.57 kg/m²     "

## 2023-07-05 NOTE — ASSESSMENT & PLAN NOTE
Sarah Padron is a 43 y.o. lady with extensive surgical history including lap band, conversion to RNY gastric bypass, and internal hernia repair x2 who presents with an intestinal intussusception, suspected at her JJ    - admit to obs under bariatric surgery, discussed with staff  - responding to fluids, continue fluid resuscitation  - repeat labs in AM  - intussusception will often resolve on its own. If it does not, she may need a diagnostic laparoscopy vs ex lap with reduction vs anastomotic revision  - Will see how she does overnight with possible PO challenge tomorrow  - if decompensates, will get a repeat lactic   - keep NPO for now  - IV pain control  - home meds  - trend LFTs, suspect will improve with resuscitation

## 2023-07-05 NOTE — CONSULTS
[facility] Dept  Bariatric Surgery  Consult Note    Patient Name: Sarah Padron  MRN: [id number]  Code Status: Full Code  Admission Date: 7/4/2023  Hospital Length of Stay: 0 days  Primary Care Provider: Margarito Neil MD    Inpatient consult to General surgery  Consult performed by: Michele Alvares MD  Consult ordered by: Karrie Stone PA-C        Subjective:     Reason for Consult: intussusception    History of Present Illness: Sarah Padron is a 43 y.o. lady with PMH obesity s/p lap band in 2008, lap band removal with conversion to bypass in 2014, ex lap with reduction of internal hernia in 2016, and diagnostic lap with reduction of internal hernia 5/2023 who presents today with an intussusception of her JJ anastomosis. She had 3 hours of sudden onset abdominal pain that worsened without relief. She had 1 episode of dark brown emesis during this period. No associated nausea, fevers, chills, change in bowel habits. She states her pain is similar to her last presentation of an internal hernia. In the ED, she is afebrile but tachycardic to 120, stable BP. HR responsive to fluids. Lactic 1.9, WBC 10.6, elevation in liver enzymes. She had a CT that showed concern for intussusception of her JJ anastomosis without obstructive findings on CT. Her pain improved with morphine.    Upon evaluation, she is mildly tender in her epigastrium without peritoneal signs. She reports her pain has improved. Continues to pass gas.       No current facility-administered medications on file prior to encounter.     Current Outpatient Medications on File Prior to Encounter   Medication Sig    amitriptyline (ELAVIL) 25 MG tablet Take 1 tablet (25 mg total) by mouth nightly as needed for Insomnia.    ergocalciferol (ERGOCALCIFEROL) 50,000 unit Cap Take 1 capsule (50,000 Units total) by mouth twice a week.    ergocalciferol (VITAMIN D2) 50,000 unit Cap Take 1 capsule (50,000 Units total) by mouth twice a  week.    esomeprazole (NEXIUM PACKET) 40 mg GrPS Take 40 mg by mouth before breakfast. Open capsule and dissolve pellets in water    oxyCODONE (ROXICODONE) 5 MG immediate release tablet Take 1 tablet (5 mg total) by mouth every 4 (four) hours as needed for Pain.    pediatric multivit-iron-min (FLINTSTONES COMPLETE, IRON,) Chew Take 1 tablet by mouth 2 (two) times daily.    polyethylene glycol (GLYCOLAX) 17 gram PwPk Take 17 g by mouth once daily.    promethazine (PHENERGAN) 25 MG tablet Take 0.5 tablets (12.5 mg total) by mouth every 6 (six) hours as needed for Nausea.    QUEtiapine (SEROQUEL) 25 MG Tab Take 50 mg by mouth nightly as needed.       Review of patient's allergies indicates:   Allergen Reactions    Dilaudid [hydromorphone] Itching    Demerol [meperidine] Nausea And Vomiting       History reviewed. No pertinent past medical history.  Past Surgical History:   Procedure Laterality Date    ABDOMINAL SURGERY      BREAST SURGERY      lift    CHOLECYSTECTOMY      DIAGNOSTIC LAPAROSCOPY N/A 5/18/2023    Procedure: LAPAROSCOPY, DIAGNOSTIC;  Surgeon: Davy Barnes MD;  Location: [facility] OR [address] [place] [address];  Service: General;  Laterality: N/A;  REPAIR INTERNAL HERNIA    EYE SURGERY Right     cataract    GASTRIC BYPASS      HERNIA REPAIR N/A 2016    internal    lap band removal  7/9/14    LAPAROSCOPIC GASTRIC BANDING      LAPAROSCOPIC GASTRIC BANDING  2008    revision     Family History       Problem Relation (Age of Onset)    Hypertension Mother, Father    Obesity Father          Tobacco Use    Smoking status: Never    Smokeless tobacco: Never   Substance and Sexual Activity    Alcohol use: Yes     Comment: ocassional; wine    Drug use: No    Sexual activity: Yes     Partners: Male     Review of Systems   Constitutional:  Negative for chills and fever.   HENT:  Negative for hearing loss and trouble swallowing.    Eyes:  Negative for discharge and visual disturbance.   Respiratory:  Negative  for chest tightness and shortness of breath.    Cardiovascular:  Negative for chest pain and palpitations.   Gastrointestinal:  Positive for abdominal pain and vomiting. Negative for abdominal distention and nausea.   Genitourinary:  Negative for difficulty urinating and hematuria.   Musculoskeletal:  Negative for arthralgias and back pain.   Skin:  Negative for rash and wound.   Neurological:  Negative for dizziness and headaches.   Objective:     Vital Signs (Most Recent):  Temp: 98.7 °F (37.1 °C) (07/04/23 1849)  Pulse: 97 (07/04/23 2123)  Resp: 15 (07/04/23 2123)  BP: 102/61 (07/04/23 2123)  SpO2: 95 % (07/04/23 2200) Vital Signs (24h Range):  Temp:  [98.6 °F (37 °C)-98.7 °F (37.1 °C)] 98.7 °F (37.1 °C)  Pulse:  [] 97  Resp:  [15-20] 15  SpO2:  [95 %-100 %] 95 %  BP: (102-127)/(57-72) 102/61     Weight: 68 kg (150 lb)  Body mass index is 26.57 kg/m².      Intake/Output - Last 3 Shifts       None            SpO2: 95 %        Physical Exam  Constitutional:       General: She is not in acute distress.     Appearance: Normal appearance.   HENT:      Head: Normocephalic and atraumatic.   Cardiovascular:      Rate and Rhythm: Regular rhythm. Tachycardia present.   Pulmonary:      Effort: Pulmonary effort is normal. No respiratory distress.   Abdominal:      General: Abdomen is flat. There is no distension.      Palpations: Abdomen is soft.      Comments: Well healed abdominal incisions  Mild tenderness to epigastrium  No distension  Not peritonitic   Neurological:      General: No focal deficit present.      Mental Status: She is alert and oriented to person, place, and time.   Psychiatric:         Behavior: Behavior normal.         Thought Content: Thought content normal.          Significant Labs:  CBC:   Recent Labs   Lab 07/04/23 1846 07/04/23 1851   WBC 10.62  --    RBC 4.43  --    HGB 9.9*  --    HCT 33.5* 36     --    MCV 76*  --    MCH 22.3*  --    MCHC 29.6*  --      CMP:   Recent Labs   Lab  07/04/23  1846   GLU 55*   CALCIUM 9.3   ALBUMIN 4.2   PROT 7.8      K 3.7   CO2 19*      BUN 20   CREATININE 0.7   ALKPHOS 60   ALT 60*   *   BILITOT 0.3       Significant Diagnostics:  CT: I have reviewed all pertinent results/findings within the past 24 hours  Concern for intussusception    VTE Risk Mitigation (From admission, onward)           Ordered     IP VTE LOW RISK PATIENT  Once         07/04/23 2205     Place sequential compression device  Until discontinued         07/04/23 2205                      Assessment/Plan:     * History of Yogesh-en-Y gastric bypass  Sarah Padron is a 43 y.o. lady with extensive surgical history including lap band, conversion to RNY gastric bypass, and internal hernia repair x2 who presents with an intestinal intussusception, suspected at her JJ    - admit to obs under bariatric surgery, discussed with staff  - responding to fluids, continue fluid resuscitation  - repeat labs in AM  - intussusception will often resolve on its own. If it does not, she may need a diagnostic laparoscopy vs ex lap with reduction vs anastomotic revision  - Will see how she does overnight with possible PO challenge tomorrow  - if decompensates, will get a repeat lactic   - keep NPO for now  - IV pain control  - home meds  - trend LFTs, suspect will improve with resuscitation        Thank you for your consult. I will follow-up with patient. Please contact us if you have any additional questions.    Michele Alvares MD  Thoracic Surgery  [facility] Dept

## 2023-07-05 NOTE — SUBJECTIVE & OBJECTIVE
No current facility-administered medications on file prior to encounter.     Current Outpatient Medications on File Prior to Encounter   Medication Sig    amitriptyline (ELAVIL) 25 MG tablet Take 1 tablet (25 mg total) by mouth nightly as needed for Insomnia.    ergocalciferol (ERGOCALCIFEROL) 50,000 unit Cap Take 1 capsule (50,000 Units total) by mouth twice a week.    ergocalciferol (VITAMIN D2) 50,000 unit Cap Take 1 capsule (50,000 Units total) by mouth twice a week.    esomeprazole (NEXIUM PACKET) 40 mg GrPS Take 40 mg by mouth before breakfast. Open capsule and dissolve pellets in water    oxyCODONE (ROXICODONE) 5 MG immediate release tablet Take 1 tablet (5 mg total) by mouth every 4 (four) hours as needed for Pain.    pediatric multivit-iron-min (FLINTSTONES COMPLETE, IRON,) Chew Take 1 tablet by mouth 2 (two) times daily.    polyethylene glycol (GLYCOLAX) 17 gram PwPk Take 17 g by mouth once daily.    promethazine (PHENERGAN) 25 MG tablet Take 0.5 tablets (12.5 mg total) by mouth every 6 (six) hours as needed for Nausea.    QUEtiapine (SEROQUEL) 25 MG Tab Take 50 mg by mouth nightly as needed.       Review of patient's allergies indicates:   Allergen Reactions    Dilaudid [hydromorphone] Itching    Demerol [meperidine] Nausea And Vomiting       History reviewed. No pertinent past medical history.  Past Surgical History:   Procedure Laterality Date    ABDOMINAL SURGERY      BREAST SURGERY      lift    CHOLECYSTECTOMY      DIAGNOSTIC LAPAROSCOPY N/A 5/18/2023    Procedure: LAPAROSCOPY, DIAGNOSTIC;  Surgeon: Davy Barnes MD;  Location: [facility] OR [address] [place] [address];  Service: General;  Laterality: N/A;  REPAIR INTERNAL HERNIA    EYE SURGERY Right     cataract    GASTRIC BYPASS      HERNIA REPAIR N/A 2016    internal    lap band removal  7/9/14    LAPAROSCOPIC GASTRIC BANDING      LAPAROSCOPIC GASTRIC BANDING  2008    revision     Family History       Problem Relation (Age of Onset)    Hypertension Mother, Father     Obesity Father          Tobacco Use    Smoking status: Never    Smokeless tobacco: Never   Substance and Sexual Activity    Alcohol use: Yes     Comment: ocassional; wine    Drug use: No    Sexual activity: Yes     Partners: Male     Review of Systems   Constitutional:  Negative for chills and fever.   HENT:  Negative for hearing loss and trouble swallowing.    Eyes:  Negative for discharge and visual disturbance.   Respiratory:  Negative for chest tightness and shortness of breath.    Cardiovascular:  Negative for chest pain and palpitations.   Gastrointestinal:  Positive for abdominal pain and vomiting. Negative for abdominal distention and nausea.   Genitourinary:  Negative for difficulty urinating and hematuria.   Musculoskeletal:  Negative for arthralgias and back pain.   Skin:  Negative for rash and wound.   Neurological:  Negative for dizziness and headaches.   Objective:     Vital Signs (Most Recent):  Temp: 98.7 °F (37.1 °C) (07/04/23 1849)  Pulse: 97 (07/04/23 2123)  Resp: 15 (07/04/23 2123)  BP: 102/61 (07/04/23 2123)  SpO2: 95 % (07/04/23 2200) Vital Signs (24h Range):  Temp:  [98.6 °F (37 °C)-98.7 °F (37.1 °C)] 98.7 °F (37.1 °C)  Pulse:  [] 97  Resp:  [15-20] 15  SpO2:  [95 %-100 %] 95 %  BP: (102-127)/(57-72) 102/61     Weight: 68 kg (150 lb)  Body mass index is 26.57 kg/m².      Intake/Output - Last 3 Shifts       None            SpO2: 95 %        Physical Exam  Constitutional:       General: She is not in acute distress.     Appearance: Normal appearance.   HENT:      Head: Normocephalic and atraumatic.   Cardiovascular:      Rate and Rhythm: Regular rhythm. Tachycardia present.   Pulmonary:      Effort: Pulmonary effort is normal. No respiratory distress.   Abdominal:      General: Abdomen is flat. There is no distension.      Palpations: Abdomen is soft.      Comments: Well healed abdominal incisions  Mild tenderness to epigastrium  No distension  Not peritonitic   Neurological:       General: No focal deficit present.      Mental Status: She is alert and oriented to person, place, and time.   Psychiatric:         Behavior: Behavior normal.         Thought Content: Thought content normal.          Significant Labs:  CBC:   Recent Labs   Lab 07/04/23  1846 07/04/23  1851   WBC 10.62  --    RBC 4.43  --    HGB 9.9*  --    HCT 33.5* 36     --    MCV 76*  --    MCH 22.3*  --    MCHC 29.6*  --      CMP:   Recent Labs   Lab 07/04/23  1846   GLU 55*   CALCIUM 9.3   ALBUMIN 4.2   PROT 7.8      K 3.7   CO2 19*      BUN 20   CREATININE 0.7   ALKPHOS 60   ALT 60*   *   BILITOT 0.3       Significant Diagnostics:  CT: I have reviewed all pertinent results/findings within the past 24 hours  Concern for intussusception    VTE Risk Mitigation (From admission, onward)           Ordered     IP VTE LOW RISK PATIENT  Once         07/04/23 2205     Place sequential compression device  Until discontinued         07/04/23 2205

## 2023-07-05 NOTE — PROGRESS NOTES
[facility]  General Surgery  Progress Note    Subjective:     History of Present Illness:  No notes on file    Post-Op Info:  Procedure(s) (LRB):  LAPAROSCOPY, DIAGNOSTIC with revision of JJ anastomosis (N/A)         Interval History: NAEO. Pain resolved, passing flatus, no BM. No nausea/vomiting. AF, HDS    Medications:  Continuous Infusions:   dextrose 5% lactated ringers 100 mL/hr at 07/04/23 2353     Scheduled Meds:   acetaminophen  1,000 mg Intravenous Q8H    pantoprazole  40 mg Intravenous Daily     PRN Meds:amitriptyline, HYDROmorphone, HYDROmorphone, LIDOcaine (PF) 10 mg/ml (1%), QUEtiapine, sodium chloride 0.9%     Review of patient's allergies indicates:   Allergen Reactions    Dilaudid [hydromorphone] Itching    Demerol [meperidine] Nausea And Vomiting     Objective:     Vital Signs (Most Recent):  Temp: 97.2 °F (36.2 °C) (07/05/23 0734)  Pulse: 71 (07/05/23 0734)  Resp: 16 (07/05/23 0828)  BP: (!) 113/53 (07/05/23 0734)  SpO2: 96 % (07/05/23 0734) Vital Signs (24h Range):  Temp:  [97.2 °F (36.2 °C)-98.7 °F (37.1 °C)] 97.2 °F (36.2 °C)  Pulse:  [] 71  Resp:  [14-20] 16  SpO2:  [94 %-100 %] 96 %  BP: (100-127)/(53-72) 113/53     Weight: 71 kg (156 lb 8.4 oz)  Body mass index is 27.73 kg/m².    Intake/Output - Last 3 Shifts         07/03 0700 07/04 0659 07/04 0700 07/05 0659 07/05 0700 07/06 0659    I.V. (mL/kg)  252.4 (3.6)     IV Piggyback  98.9     Total Intake(mL/kg)  351.3 (4.9)     Net  +351.3                     Physical Exam  Constitutional:       General: She is not in acute distress.     Appearance: Normal appearance.   HENT:      Head: Normocephalic and atraumatic.   Cardiovascular:      Rate and Rhythm: Regular rhythm. Tachycardia present.   Pulmonary:      Effort: Pulmonary effort is normal. No respiratory distress.   Abdominal:      General: Abdomen is flat. There is no distension.      Palpations: Abdomen is soft.      Comments: Well healed abdominal  incisions  Tenderness improved  No distension  Not peritonitic   Neurological:      General: No focal deficit present.      Mental Status: She is alert and oriented to person, place, and time.   Psychiatric:         Behavior: Behavior normal.         Thought Content: Thought content normal.        Significant Labs:  I have reviewed all pertinent lab results within the past 24 hours.  CBC:   Recent Labs   Lab 07/05/23 0439   WBC 7.43   RBC 3.95*   HGB 8.6*   HCT 29.8*      MCV 75*   MCH 21.8*   MCHC 28.9*     CMP:   Recent Labs   Lab 07/05/23 0439   GLU 86   CALCIUM 8.2*   ALBUMIN 3.2*   PROT 6.0      K 3.9   CO2 26      BUN 14   CREATININE 0.6   ALKPHOS 64   ALT 84*   *   BILITOT 0.4       Significant Diagnostics:  I have reviewed all pertinent imaging results/findings within the past 24 hours.    Assessment/Plan:     * History of Yogesh-en-Y gastric bypass  Sarah Padron is a 43 y.o. lady with extensive surgical history including lap band, conversion to RNY gastric bypass, and internal hernia repair x2 who presents with an intestinal intussusception, suspected at her JJ     - Patient is feeling much better this morning  - given the size of her anastomosis, this is likely to recur, recommend diagnostic lap with JJ revision  - consent obtained  - NPO   - continue IVF  - transaminases downtrending  - IV pain control  - home meds  - replace lytes partha Terry MD  General Surgery  [facility]

## 2023-07-05 NOTE — OP NOTE
DATE OF PROCEDURE: 7/5/2023    PRE OP DIAGNOSIS: Enteric intussusception [K56.1]    POST OP DIAGNOSIS: Enteric intussusception [K56.1]    PROCEDURE: Procedure(s) (LRB):  LAPAROSCOPY, DIAGNOSTIC with revision of JJ anastomosis (N/A)    Surgeon(s) and Role:     * Davy Barnes MD - Primary     * Christopher Terry MD - Resident - Assisting    ANESTHESIA: General.     Procedure:  The patient was placed under general anesthesia and the abdomen prepped and draped in usual manner.  Access to the peritoneum was gained through the umbilicus using Optiview trocar under direct vision and pneumoperitoneum to 15 mmHg CO2 gas was obtained.  There were no adhesions due to the prior surgeries between the abdominal wall and bowels in the areas that were reviewed.  A 5 and 10 mm trocar placed in the right mid abdomen under direct vision a 5 mm trocar was placed in left mid abdomen under direct vision we traced the Yogesh limb from the gastrojejunostomy of the JJ and traced the biliopancreatic limb to the ligament of Treitz and we partially traced the common channel back and saw no problems in these areas there was no intussusception and we checked for internal hernias and under the Yogesh limb and at the JJ these were closed.  We could easily see that the common channel could intussusception into the biliopancreatic limb and using an Endo-MILY we partially transected the prior staple line which was where the biliopancreatic limb attached to the small bowel.  Once this was done we checked to make sure adequate anastomosis was left and then we used 2 permanent 2-0 Endo Stitch it stitches between the common channel and biliopancreatic limb to further hold the bowel back from being able to intussuscept.  We then inspected the abdomen for hemostasis and removed the trocars under direct vision.  Prior to removing the last trocar pneumoperitoneum was allowed to escape.  The fascia of the primary trocar site was closed with 0 Vicryl and the  skin incisions were infiltrated with Marcaine and closed with 4 plain catgut and reinforced with Dermabond.  The patient tolerated procedure well was brought to recovery room stable condition.  Sponge and needle counts were correct at the end of the case.  Blood loss was minimal, complications none and pathology none.      This dictation was done with voice recognition software and there may be errors.

## 2023-07-05 NOTE — H&P
Please see consult note from 7/4/23 for full details.    Michele Alvares MD  [facility] PGY3

## 2023-07-05 NOTE — NURSING TRANSFER
Nursing Transfer Note      7/5/2023     Reason patient is being transferred: per md order    Transfer To: [id number]    Transfer via stretcher    Transfer with n/a    Transported by pt escort    Medicines sent: n/a    Any special needs or follow-up needed: n/a    Chart send with patient: Yes    Notified: mother

## 2023-07-05 NOTE — ANESTHESIA PREPROCEDURE EVALUATION
[facility]  Anesthesia Pre-Operative Evaluation         Patient Name: Sarah Padron  YOB: 1980  MRN: [id number]    SUBJECTIVE:     Pre-operative evaluation for Procedure(s) (LRB):  LAPAROSCOPY, DIAGNOSTIC with revision of JJ anastomosis (N/A)     07/05/2023    Sarah Padron is a 43 y.o. female w/ a significant PMHx of cervical radicular pain, DDD, history of gastric bypass who presents with intestinal intussusception suspected at JJ.     Patient now presents for the above procedure(s).      LDA: None documented.       Peripheral IV - Single Lumen 07/04/23 1916 20 G Left Antecubital (Active)   Site Assessment Clean;Dry;Intact 07/05/23 0345   Extremity Assessment Distal to IV No abnormal discoloration 07/05/23 0345   Line Status Infusing 07/05/23 0345   Dressing Status Clean;Dry;Intact 07/05/23 0345   Dressing Intervention Integrity maintained 07/05/23 0345   Number of days: 0            Peripheral IV - Single Lumen 07/05/23 0000 20 G Right Antecubital (Active)   Site Assessment Intact;Clean;Dry 07/05/23 0345   Extremity Assessment Distal to IV No abnormal discoloration 07/05/23 0345   Line Status Saline locked 07/05/23 0345   Dressing Status Clean;Dry;Intact 07/05/23 0345   Dressing Intervention Integrity maintained 07/05/23 0345   Number of days: 0       Prev airway:   Intubation:     Induction:  Intravenous    Intubated:  Postinduction    Mask Ventilation:  Not attempted    Attempts:  1    Attempted By:  CRNA    Method of Intubation:  Video laryngoscopy    Blade:  Bloom 3    Laryngeal View Grade: Grade I - full view of cords      Difficult Airway Encountered?: No      Complications:  None    Airway Device:  Oral endotracheal tube    Airway Device Size:  7.5    Style/Cuff Inflation:  Cuffed    Inflation Amount (mL):  5    Tube secured:  21    Secured at:  The lips    Placement Verified By:  Capnometry    Complicating Factors:  None    Findings Post-Intubation:   BS       dextrose 5% lactated ringers 100 mL/hr at 07/04/23 8784       Patient Active Problem List   Diagnosis    Vitamin D deficiency    History of Yogesh-en-Y gastric bypass    Cervical radiculopathy    Facet arthropathy, cervical    DDD (degenerative disc disease)    Spondylosis without myelopathy    Cervical radicular pain    Pain in limb    Internal hernia    Supervision of normal pregnancy in third trimester    BMI 27.0-27.9,adult       Review of patient's allergies indicates:   Allergen Reactions    Dilaudid [hydromorphone] Itching    Demerol [meperidine] Nausea And Vomiting       Current Inpatient Medications:   acetaminophen  1,000 mg Intravenous Q8H    pantoprazole  40 mg Intravenous Daily       No current facility-administered medications on file prior to encounter.     Current Outpatient Medications on File Prior to Encounter   Medication Sig Dispense Refill    amitriptyline (ELAVIL) 25 MG tablet Take 1 tablet (25 mg total) by mouth nightly as needed for Insomnia. 30 tablet 2    ergocalciferol (ERGOCALCIFEROL) 50,000 unit Cap Take 1 capsule (50,000 Units total) by mouth twice a week. 24 capsule 0    ergocalciferol (VITAMIN D2) 50,000 unit Cap Take 1 capsule (50,000 Units total) by mouth twice a week. 24 capsule 0    esomeprazole (NEXIUM PACKET) 40 mg GrPS Take 40 mg by mouth before breakfast. Open capsule and dissolve pellets in water 30 each 11    oxyCODONE (ROXICODONE) 5 MG immediate release tablet Take 1 tablet (5 mg total) by mouth every 4 (four) hours as needed for Pain. 20 tablet 0    pediatric multivit-iron-min (FLINTSTONES COMPLETE, IRON,) Chew Take 1 tablet by mouth 2 (two) times daily.      polyethylene glycol (GLYCOLAX) 17 gram PwPk Take 17 g by mouth once daily.  0    promethazine (PHENERGAN) 25 MG tablet Take 0.5 tablets (12.5 mg total) by mouth every 6 (six) hours as needed for Nausea. 15 tablet 0    QUEtiapine (SEROQUEL) 25 MG Tab Take 50 mg by mouth nightly as  needed.         Past Surgical History:   Procedure Laterality Date    ABDOMINAL SURGERY      BREAST SURGERY      lift    CHOLECYSTECTOMY      DIAGNOSTIC LAPAROSCOPY N/A 5/18/2023    Procedure: LAPAROSCOPY, DIAGNOSTIC;  Surgeon: Davy Barnes MD;  Location: [facility] OR [address] [place] [address];  Service: General;  Laterality: N/A;  REPAIR INTERNAL HERNIA    EYE SURGERY Right     cataract    GASTRIC BYPASS      HERNIA REPAIR N/A 2016    internal    lap band removal  7/9/14    LAPAROSCOPIC GASTRIC BANDING      LAPAROSCOPIC GASTRIC BANDING  2008    revision       Social History:  Tobacco Use: Low Risk     Smoking Tobacco Use: Never    Smokeless Tobacco Use: Never    Passive Exposure: Not on file      Alcohol Use: Not on file        OBJECTIVE:     Vital Signs Range (Last 24H):  Temp:  [36.2 °C (97.2 °F)-37.1 °C (98.7 °F)]   Pulse:  []   Resp:  [14-20]   BP: (100-127)/(53-72)   SpO2:  [94 %-100 %]       Significant Labs:  Lab Results   Component Value Date    WBC 7.43 07/05/2023    HGB 8.6 (L) 07/05/2023    HCT 29.8 (L) 07/05/2023     07/05/2023    CHOL 162 07/08/2016    TRIG 80 07/08/2016    HDL 60 07/08/2016    ALT 84 (H) 07/05/2023     (H) 07/05/2023     07/05/2023    K 3.9 07/05/2023     07/05/2023    CREATININE 0.6 07/05/2023    BUN 14 07/05/2023    CO2 26 07/05/2023    TSH 1.391 09/29/2015    HGBA1C 5.7 06/27/2014       Diagnostic Studies: No relevant studies.    EKG:   Results for orders placed or performed during the hospital encounter of 02/11/16   EKG 12-lead    Collection Time: 02/12/16 12:26 AM    Narrative    Test Reason : R07.9    Vent. Rate : 088 BPM     Atrial Rate : 088 BPM     P-R Int : 122 ms          QRS Dur : 080 ms      QT Int : 354 ms       P-R-T Axes : 055 091 057 degrees     QTc Int : 428 ms    Normal sinus rhythm  Rightward axis  Low voltage QRS  Abnormal ECG  When compared with ECG of 26-JUN-2014 15:32,  No significant change was found  Confirmed by Demarcus THORNE,  Komal ([id number]) on 2/12/2016 9:41:00 AM    Referred By: SELF REFERRAL           Confirmed By:Komal Tracy MD       2D ECHO:  TTE:  No results found for this or any previous visit.    CRISTINA:  No results found for this or any previous visit.    ASSESSMENT/PLAN:       Pre-op Assessment    I have reviewed the Patient Summary Reports.     I have reviewed the Nursing Notes. I have reviewed the NPO Status.   I have reviewed the Medications.     Review of Systems  Anesthesia Hx:  No problems with previous Anesthesia   Denies Personal Hx of Anesthesia complications.   Cardiovascular:  Cardiovascular Normal     Pulmonary:  Pulmonary Normal    Renal/:  Renal/ Normal     Hepatic/GI:  Hepatic/GI Normal    Musculoskeletal:   Arthritis   Spine Disorders: (cervical radiculopathy C2)    Neurological:  Neurology Normal        Physical Exam  General: Well nourished, Cooperative, Alert and Oriented    Airway:  Mallampati: I / I  Mouth Opening: Normal  TM Distance: Normal  Tongue: Normal  Neck ROM: Normal ROM    Dental:  Intact        Anesthesia Plan  Type of Anesthesia, risks & benefits discussed:    Anesthesia Type: Gen ETT  Intra-op Monitoring Plan: Standard ASA Monitors  Post Op Pain Control Plan: multimodal analgesia and IV/PO Opioids PRN  Induction:  IV  Airway Plan: Video, Post-Induction  Informed Consent: Informed consent signed with the Patient and all parties understand the risks and agree with anesthesia plan.  All questions answered.   ASA Score: 3  Day of Surgery Review of History & Physical: H&P Update referred to the surgeon/provider.    Ready For Surgery From Anesthesia Perspective.     .

## 2023-07-05 NOTE — ED TRIAGE NOTES
Sarah Padron, a 43 y.o. female presents to the ED w/ complaint of 10/10 epigastric abdominal pain onset around 1700 today. Hx of Gastric bypass 2014, revision 6 mths ago, small intestine was trapped in between stomach. Had internal hernia on 5/18. Pt experiencing N/V with one brown emesis and HA.     Triage note:  Chief Complaint   Patient presents with    Abdominal Pain     Gastric bypass 2014, revision 6 mos ago, sm intestine was trapped in bet stomach     Review of patient's allergies indicates:   Allergen Reactions    Dilaudid [hydromorphone] Itching    Demerol [meperidine] Nausea And Vomiting     History reviewed. No pertinent past medical history.

## 2023-07-06 VITALS
DIASTOLIC BLOOD PRESSURE: 67 MMHG | WEIGHT: 149.25 LBS | BODY MASS INDEX: 26.45 KG/M2 | HEART RATE: 71 BPM | SYSTOLIC BLOOD PRESSURE: 107 MMHG | OXYGEN SATURATION: 97 % | HEIGHT: 63 IN | TEMPERATURE: 97 F | RESPIRATION RATE: 16 BRPM

## 2023-07-06 LAB
ALBUMIN SERPL BCP-MCNC: 3.2 G/DL (ref 3.5–5.2)
ALP SERPL-CCNC: 66 U/L (ref 55–135)
ALT SERPL W/O P-5'-P-CCNC: 44 U/L (ref 10–44)
ANION GAP SERPL CALC-SCNC: 8 MMOL/L (ref 8–16)
AST SERPL-CCNC: 32 U/L (ref 10–40)
BASOPHILS # BLD AUTO: 0.06 K/UL (ref 0–0.2)
BASOPHILS NFR BLD: 0.7 % (ref 0–1.9)
BILIRUB SERPL-MCNC: 0.4 MG/DL (ref 0.1–1)
BUN SERPL-MCNC: 6 MG/DL (ref 6–20)
CALCIUM SERPL-MCNC: 8.7 MG/DL (ref 8.7–10.5)
CHLORIDE SERPL-SCNC: 104 MMOL/L (ref 95–110)
CO2 SERPL-SCNC: 26 MMOL/L (ref 23–29)
CREAT SERPL-MCNC: 0.6 MG/DL (ref 0.5–1.4)
DIFFERENTIAL METHOD: ABNORMAL
EOSINOPHIL # BLD AUTO: 0.1 K/UL (ref 0–0.5)
EOSINOPHIL NFR BLD: 0.6 % (ref 0–8)
ERYTHROCYTE [DISTWIDTH] IN BLOOD BY AUTOMATED COUNT: 17.6 % (ref 11.5–14.5)
EST. GFR  (NO RACE VARIABLE): >60 ML/MIN/1.73 M^2
GLUCOSE SERPL-MCNC: 88 MG/DL (ref 70–110)
HCT VFR BLD AUTO: 31.6 % (ref 37–48.5)
HGB BLD-MCNC: 9.1 G/DL (ref 12–16)
IMM GRANULOCYTES # BLD AUTO: 0.01 K/UL (ref 0–0.04)
IMM GRANULOCYTES NFR BLD AUTO: 0.1 % (ref 0–0.5)
LYMPHOCYTES # BLD AUTO: 2.3 K/UL (ref 1–4.8)
LYMPHOCYTES NFR BLD: 28.6 % (ref 18–48)
MAGNESIUM SERPL-MCNC: 1.7 MG/DL (ref 1.6–2.6)
MCH RBC QN AUTO: 22 PG (ref 27–31)
MCHC RBC AUTO-ENTMCNC: 28.8 G/DL (ref 32–36)
MCV RBC AUTO: 77 FL (ref 82–98)
MONOCYTES # BLD AUTO: 0.9 K/UL (ref 0.3–1)
MONOCYTES NFR BLD: 11.2 % (ref 4–15)
NEUTROPHILS # BLD AUTO: 4.8 K/UL (ref 1.8–7.7)
NEUTROPHILS NFR BLD: 58.8 % (ref 38–73)
NRBC BLD-RTO: 0 /100 WBC
PHOSPHATE SERPL-MCNC: 3.1 MG/DL (ref 2.7–4.5)
PLATELET # BLD AUTO: 296 K/UL (ref 150–450)
PMV BLD AUTO: 10.3 FL (ref 9.2–12.9)
POTASSIUM SERPL-SCNC: 3.6 MMOL/L (ref 3.5–5.1)
PROT SERPL-MCNC: 6 G/DL (ref 6–8.4)
RBC # BLD AUTO: 4.13 M/UL (ref 4–5.4)
SODIUM SERPL-SCNC: 138 MMOL/L (ref 136–145)
WBC # BLD AUTO: 8.11 K/UL (ref 3.9–12.7)

## 2023-07-06 PROCEDURE — 84100 ASSAY OF PHOSPHORUS: CPT | Performed by: STUDENT IN AN ORGANIZED HEALTH CARE EDUCATION/TRAINING PROGRAM

## 2023-07-06 PROCEDURE — 80053 COMPREHEN METABOLIC PANEL: CPT | Performed by: STUDENT IN AN ORGANIZED HEALTH CARE EDUCATION/TRAINING PROGRAM

## 2023-07-06 PROCEDURE — 63600175 PHARM REV CODE 636 W HCPCS: Performed by: STUDENT IN AN ORGANIZED HEALTH CARE EDUCATION/TRAINING PROGRAM

## 2023-07-06 PROCEDURE — 25000003 PHARM REV CODE 250

## 2023-07-06 PROCEDURE — C9113 INJ PANTOPRAZOLE SODIUM, VIA: HCPCS | Performed by: STUDENT IN AN ORGANIZED HEALTH CARE EDUCATION/TRAINING PROGRAM

## 2023-07-06 PROCEDURE — 63600175 PHARM REV CODE 636 W HCPCS

## 2023-07-06 PROCEDURE — 85025 COMPLETE CBC W/AUTO DIFF WBC: CPT | Performed by: STUDENT IN AN ORGANIZED HEALTH CARE EDUCATION/TRAINING PROGRAM

## 2023-07-06 PROCEDURE — 36415 COLL VENOUS BLD VENIPUNCTURE: CPT | Performed by: STUDENT IN AN ORGANIZED HEALTH CARE EDUCATION/TRAINING PROGRAM

## 2023-07-06 PROCEDURE — 83735 ASSAY OF MAGNESIUM: CPT | Performed by: STUDENT IN AN ORGANIZED HEALTH CARE EDUCATION/TRAINING PROGRAM

## 2023-07-06 RX ORDER — PANTOPRAZOLE SODIUM 40 MG/1
40 TABLET, DELAYED RELEASE ORAL DAILY
Status: DISCONTINUED | OUTPATIENT
Start: 2023-07-07 | End: 2023-07-06 | Stop reason: HOSPADM

## 2023-07-06 RX ORDER — OXYCODONE AND ACETAMINOPHEN 5; 325 MG/1; MG/1
1 TABLET ORAL EVERY 4 HOURS PRN
Qty: 10 TABLET | Refills: 0 | Status: SHIPPED | OUTPATIENT
Start: 2023-07-06 | End: 2023-07-27

## 2023-07-06 RX ORDER — GABAPENTIN 300 MG/1
300 CAPSULE ORAL 3 TIMES DAILY
Status: DISCONTINUED | OUTPATIENT
Start: 2023-07-06 | End: 2023-07-06 | Stop reason: HOSPADM

## 2023-07-06 RX ORDER — GABAPENTIN 300 MG/1
300 CAPSULE ORAL 3 TIMES DAILY
Qty: 42 CAPSULE | Refills: 0 | OUTPATIENT
Start: 2023-07-06 | End: 2023-07-06 | Stop reason: SDUPTHER

## 2023-07-06 RX ORDER — OXYCODONE AND ACETAMINOPHEN 5; 325 MG/1; MG/1
1 TABLET ORAL EVERY 4 HOURS PRN
Qty: 15 TABLET | Refills: 0 | Status: SHIPPED | OUTPATIENT
Start: 2023-07-06 | End: 2023-07-06 | Stop reason: SDUPTHER

## 2023-07-06 RX ORDER — OXYCODONE AND ACETAMINOPHEN 5; 325 MG/1; MG/1
1 TABLET ORAL EVERY 4 HOURS PRN
Status: DISCONTINUED | OUTPATIENT
Start: 2023-07-06 | End: 2023-07-06 | Stop reason: HOSPADM

## 2023-07-06 RX ORDER — GABAPENTIN 300 MG/1
300 CAPSULE ORAL 3 TIMES DAILY
Qty: 42 CAPSULE | Refills: 0 | Status: SHIPPED | OUTPATIENT
Start: 2023-07-06 | End: 2023-07-27

## 2023-07-06 RX ADMIN — PANTOPRAZOLE SODIUM 40 MG: 40 INJECTION, POWDER, FOR SOLUTION INTRAVENOUS at 08:07

## 2023-07-06 RX ADMIN — GABAPENTIN 300 MG: 300 CAPSULE ORAL at 03:07

## 2023-07-06 RX ADMIN — POTASSIUM BICARBONATE 25 MEQ: 978 TABLET, EFFERVESCENT ORAL at 10:07

## 2023-07-06 RX ADMIN — OXYCODONE HYDROCHLORIDE 5 MG: 5 TABLET ORAL at 03:07

## 2023-07-06 RX ADMIN — GABAPENTIN 300 MG: 300 CAPSULE ORAL at 08:07

## 2023-07-06 RX ADMIN — HYDROMORPHONE HYDROCHLORIDE 0.5 MG: 1 INJECTION, SOLUTION INTRAMUSCULAR; INTRAVENOUS; SUBCUTANEOUS at 05:07

## 2023-07-06 RX ADMIN — OXYCODONE HYDROCHLORIDE AND ACETAMINOPHEN 1 TABLET: 5; 325 TABLET ORAL at 10:07

## 2023-07-06 RX ADMIN — ACETAMINOPHEN 1000 MG: 500 TABLET ORAL at 05:07

## 2023-07-06 NOTE — DISCHARGE INSTRUCTIONS
Postoperative General Instructions    What to Expect:  It is normal to experience pain and swelling at the surgical site.  The pain usually decreases significantly after the first week but may last for many weeks.  Each day, the pain should be similar or better to the previous day. If it worsens, call the doctor's office.     Activity:  You should walk beginning on the day of surgery and increase activity slowly over the next two to four weeks.  Do not drive while taking prescription pain medication.  You may return to work when you feel ready.  Do not lift anything heavier than 10 lbs for 4 weeks     Wound Care:  You may shower. Let soap and water run over the incisions and pat dry. Do not submerge in a bathtub or pool.  If you have an open wound, you should change the dressing twice daily with moist gauze.     Diet:  You will be on a full liquid diet for 2 days after that you can resume a regular diet.  Take a stool softener or laxative to avoid constipation.     Medication:  Pain Control  Take the prescribed percocet(oxycodone-tylenol) as needed for pain.  Bowel Regularity  Take an over-the-counter stool softener/laxative (Colace, Miralax, or Milk of Magnesia) to avoid constipation.  Previous Home Medication  Restart your previous home medication unless otherwise instructed.    Call Your Doctor's Office If You Experience:  Fevers greater than 101.3°F.  Vomiting.  Spreading redness or drainage from the incisions.  Opening of the incisions.  Worsening pain not controlled by medication.  Chest pain or shortness of breath.    Follow-Up:  Follow-up with your surgeon as scheduled in 2 weeks.  If no follow-up appointment has been scheduled, call to schedule an appointment within 1-2 weeks of discharge.     Contact Information:  During normal business hours call the clinic with any questions or concerns. On weekends and evenings call [phone] to have the operators page the surgery resident on call after hours with  questions or concerns.

## 2023-07-06 NOTE — HOSPITAL COURSE
JACK GOODMAN 43 y.o.female underwent: Procedure(s) (LRB):  LAPAROSCOPY, DIAGNOSTIC with revision of JJ anastomosis (N/A). The patient tolerated the procedure well, was transferred to recovery post-op, and then transferred to the floor for continuation of medical care. The patient's clinical condition progressively improved. By the time of discharge, she was tolerating a diet without nausea or vomiting, pain was well controlled with oral medications, and she was ambulating without difficulty. On POD 1 the patient was discharged to home. On discharge, the patient's incisions were c/d/i and the surgical site was soft and appropriately tender to palpation. The patient will follow up in Dr. Barnes's clinic in 2 weeks.

## 2023-07-06 NOTE — SUBJECTIVE & OBJECTIVE
Interval History: NAEO. Tolerating CLD without nausea/vomiting. Is passing flatus, no BM yet. Some increased RLQ pain. Labs not yet drawn this morning. AF, HDS    Medications:  Continuous Infusions:  Scheduled Meds:   gabapentin  300 mg Oral TID    pantoprazole  40 mg Intravenous Daily     PRN Meds:amitriptyline, HYDROmorphone, LIDOcaine (PF) 10 mg/ml (1%), oxyCODONE-acetaminophen, QUEtiapine, sodium chloride 0.9%     Review of patient's allergies indicates:   Allergen Reactions    Dilaudid [hydromorphone] Itching    Demerol [meperidine] Nausea And Vomiting     Objective:     Vital Signs (Most Recent):  Temp: 97.6 °F (36.4 °C) (07/06/23 0431)  Pulse: (!) 59 (07/06/23 0431)  Resp: 18 (07/06/23 0538)  BP: 106/62 (07/06/23 0431)  SpO2: 100 % (07/06/23 0431) Vital Signs (24h Range):  Temp:  [97.2 °F (36.2 °C)-98.2 °F (36.8 °C)] 97.6 °F (36.4 °C)  Pulse:  [59-78] 59  Resp:  [0-20] 18  SpO2:  [96 %-100 %] 100 %  BP: ()/(53-79) 106/62     Weight: 67.7 kg (149 lb 4 oz)  Body mass index is 26.45 kg/m².    Intake/Output - Last 3 Shifts         07/04 0700 07/05 0659 07/05 0700 07/06 0659 07/06 0700 07/07 0659    I.V. (mL/kg) 252.4 (3.6)      IV Piggyback 98.9 900     Total Intake(mL/kg) 351.3 (4.9) 900 (13.3)     Net +351.3 +900                     Physical Exam  Constitutional:       General: She is not in acute distress.     Appearance: Normal appearance.   HENT:      Head: Normocephalic and atraumatic.   Cardiovascular:      Rate and Rhythm: Regular rhythm. Tachycardia present.   Pulmonary:      Effort: Pulmonary effort is normal. No respiratory distress.   Abdominal:      General: Abdomen is flat. There is no distension.      Palpations: Abdomen is soft.      Tenderness: There is abdominal tenderness.      Comments: Appropriately tender. Incisions CDI with minimal erythema, no induration, bleeding or drainage. Dermabond overlying wounds     Neurological:      General: No focal deficit present.      Mental  Status: She is alert and oriented to person, place, and time.   Psychiatric:         Behavior: Behavior normal.         Thought Content: Thought content normal.        Significant Labs:  I have reviewed all pertinent lab results within the past 24 hours.  CBC:   Recent Labs   Lab 07/05/23 0439   WBC 7.43   RBC 3.95*   HGB 8.6*   HCT 29.8*      MCV 75*   MCH 21.8*   MCHC 28.9*     CMP:   Recent Labs   Lab 07/05/23 0439   GLU 86   CALCIUM 8.2*   ALBUMIN 3.2*   PROT 6.0      K 3.9   CO2 26      BUN 14   CREATININE 0.6   ALKPHOS 64   ALT 84*   *   BILITOT 0.4       Significant Diagnostics:  I have reviewed all pertinent imaging results/findings within the past 24 hours.

## 2023-07-06 NOTE — ASSESSMENT & PLAN NOTE
Sarah Padron is a 43 y.o. lady with extensive surgical history including lap band, conversion to RNY gastric bypass, and internal hernia repair x2 who presents with an intestinal intussusception, suspected at her JJ. Now s/p diagnostic lap with JJ revision 7/5/22     - Doing okay this morning, tolerating CLD though does have some increased pain  - states percocet worked well in the past, will switch to this and add oxycodone  - advance to FLD  - f/u AM labs  - home meds  - replace lytes prn  - likely DC this afternoon if pain controlled

## 2023-07-06 NOTE — PROGRESS NOTES
Pharmacist Intervention IV to PO Note    Sarah Padron is a 43 y.o. female being treated with IV medication pantoprazole    Patient Data:    Vital Signs (Most Recent):  Temp: 97.4 °F (36.3 °C) (07/06/23 1131)  Pulse: 71 (07/06/23 1131)  Resp: 16 (07/06/23 1131)  BP: 107/67 (07/06/23 1131)  SpO2: 97 % (07/06/23 1131) Vital Signs (72h Range):  Temp:  [97.2 °F (36.2 °C)-98.7 °F (37.1 °C)]   Pulse:  []   Resp:  [0-20]   BP: ()/(53-79)   SpO2:  [94 %-100 %]      CBC:  Recent Labs   Lab 07/04/23  1846 07/04/23  1851 07/05/23  0439 07/06/23  0814   WBC 10.62  --  7.43 8.11   RBC 4.43  --  3.95* 4.13   HGB 9.9*  --  8.6* 9.1*   HCT 33.5* 36 29.8* 31.6*     --  278 296   MCV 76*  --  75* 77*   MCH 22.3*  --  21.8* 22.0*   MCHC 29.6*  --  28.9* 28.8*     CMP:     Recent Labs   Lab 07/04/23  1846 07/05/23  0439 07/06/23  0814   GLU 55* 86 88   CALCIUM 9.3 8.2* 8.7   ALBUMIN 4.2 3.2* 3.2*   PROT 7.8 6.0 6.0    140 138   K 3.7 3.9 3.6   CO2 19* 26 26    108 104   BUN 20 14 6   CREATININE 0.7 0.6 0.6   ALKPHOS 60 64 66   ALT 60* 84* 44   * 147* 32   BILITOT 0.3 0.4 0.4       Dietary Orders:  Diet Orders            Diet full liquid: Full Liquid starting at 07/06 0612            Based on the following criteria, this patient qualifies for intravenous to oral conversion:  [x] The patients gastrointestinal tract is functioning (tolerating medications via oral or enteral route for 24 hours and tolerating food or enteral feeds for 24 hours).  [x] The patient is hemodynamically stable for 24 hours (heart rate <100 beats per minute, systolic blood pressure >99 mm Hg, and respiratory rate <20 breaths per minute).  [x] The patient shows clinical improvement (afebrile for at least 24 hours and white blood cell count downtrending or normalized). Additionally, the patient must be non-neutropenic (absolute neutrophil count >500 cells/mm3).  [x] For antimicrobials, the patient has received IV  therapy for at least 24 hours.    IV medication pantoprazole will be changed to oral medication pantoprazole     Pharmacist's Name: Guillermina Roberson  Pharmacist's Extension: [phone]

## 2023-07-06 NOTE — PROGRESS NOTES
[facility]  General Surgery  Progress Note    Subjective:     History of Present Illness:  No notes on file    Post-Op Info:  Procedure(s) (LRB):  LAPAROSCOPY, DIAGNOSTIC with revision of JJ anastomosis (N/A)   1 Day Post-Op     Interval History: NAEO. Tolerating CLD without nausea/vomiting. Is passing flatus, no BM yet. Some increased RLQ pain. Labs not yet drawn this morning. AF, HDS    Medications:  Continuous Infusions:  Scheduled Meds:   gabapentin  300 mg Oral TID    pantoprazole  40 mg Intravenous Daily     PRN Meds:amitriptyline, HYDROmorphone, LIDOcaine (PF) 10 mg/ml (1%), oxyCODONE-acetaminophen, QUEtiapine, sodium chloride 0.9%     Review of patient's allergies indicates:   Allergen Reactions    Dilaudid [hydromorphone] Itching    Demerol [meperidine] Nausea And Vomiting     Objective:     Vital Signs (Most Recent):  Temp: 97.6 °F (36.4 °C) (07/06/23 0431)  Pulse: (!) 59 (07/06/23 0431)  Resp: 18 (07/06/23 0538)  BP: 106/62 (07/06/23 0431)  SpO2: 100 % (07/06/23 0431) Vital Signs (24h Range):  Temp:  [97.2 °F (36.2 °C)-98.2 °F (36.8 °C)] 97.6 °F (36.4 °C)  Pulse:  [59-78] 59  Resp:  [0-20] 18  SpO2:  [96 %-100 %] 100 %  BP: ()/(53-79) 106/62     Weight: 67.7 kg (149 lb 4 oz)  Body mass index is 26.45 kg/m².    Intake/Output - Last 3 Shifts         07/04 0700 07/05 0659 07/05 0700 07/06 0659 07/06 0700 07/07 0659    I.V. (mL/kg) 252.4 (3.6)      IV Piggyback 98.9 900     Total Intake(mL/kg) 351.3 (4.9) 900 (13.3)     Net +351.3 +900                     Physical Exam  Constitutional:       General: She is not in acute distress.     Appearance: Normal appearance.   HENT:      Head: Normocephalic and atraumatic.   Cardiovascular:      Rate and Rhythm: Regular rhythm. Tachycardia present.   Pulmonary:      Effort: Pulmonary effort is normal. No respiratory distress.   Abdominal:      General: Abdomen is flat. There is no distension.      Palpations: Abdomen is soft.      Tenderness: There  is abdominal tenderness.      Comments: Appropriately tender. Incisions CDI with minimal erythema, no induration, bleeding or drainage. Dermabond overlying wounds     Neurological:      General: No focal deficit present.      Mental Status: She is alert and oriented to person, place, and time.   Psychiatric:         Behavior: Behavior normal.         Thought Content: Thought content normal.        Significant Labs:  I have reviewed all pertinent lab results within the past 24 hours.  CBC:   Recent Labs   Lab 07/05/23  0439   WBC 7.43   RBC 3.95*   HGB 8.6*   HCT 29.8*      MCV 75*   MCH 21.8*   MCHC 28.9*     CMP:   Recent Labs   Lab 07/05/23  0439   GLU 86   CALCIUM 8.2*   ALBUMIN 3.2*   PROT 6.0      K 3.9   CO2 26      BUN 14   CREATININE 0.6   ALKPHOS 64   ALT 84*   *   BILITOT 0.4       Significant Diagnostics:  I have reviewed all pertinent imaging results/findings within the past 24 hours.    Assessment/Plan:     * History of Yogesh-en-Y gastric bypass  Sarah Padron is a 43 y.o. lady with extensive surgical history including lap band, conversion to RNY gastric bypass, and internal hernia repair x2 who presents with an intestinal intussusception, suspected at her JJ. Now s/p diagnostic lap with JJ revision 7/5/22     - Doing okay this morning, tolerating CLD though does have some increased pain  - states percocet worked well in the past, will switch to this and add oxycodone  - advance to FLD  - f/u AM labs  - home meds  - replace lytes prn  - likely DC this afternoon if pain controlled        Christopher Terry MD  General Surgery  [facility]

## 2023-07-06 NOTE — DISCHARGE SUMMARY
[facility]  General Surgery  Discharge Summary      Patient Name: Jack Padron  MRN: [id number]  Admission Date: 7/4/2023  Hospital Length of Stay: 2 days  Discharge Date and Time:  07/06/2023 1:25 PM  Attending Physician: Davy Barnes MD   Discharging Provider: Sana Luna MD  Primary Care Provider: Margarito Neil MD    HPI:   Jack Padron is a 43 y.o. lady with PMH obesity s/p lap band in 2008, lap band removal with conversion to bypass in 2014, ex lap with reduction of internal hernia in 2016, and diagnostic lap with reduction of internal hernia 5/2023 who presents today with an intussusception of her JJ anastomosis. She had 3 hours of sudden onset abdominal pain that worsened without relief. She had 1 episode of dark brown emesis during this period. No associated nausea, fevers, chills, change in bowel habits. She states her pain is similar to her last presentation of an internal hernia. In the ED, she is afebrile but tachycardic to 120, stable BP. HR responsive to fluids. Lactic 1.9, WBC 10.6, elevation in liver enzymes. She had a CT that showed concern for intussusception of her JJ anastomosis without obstructive findings on CT. Her pain improved with morphine.     Upon evaluation, she is mildly tender in her epigastrium without peritoneal signs. She reports her pain has improved. Continues to pass gas.   Procedure(s) (LRB):  LAPAROSCOPY, DIAGNOSTIC with revision of JJ anastomosis (N/A)      Indwelling Lines/Drains at time of discharge:   Lines/Drains/Airways     None               Hospital Course: JACK PADRON 43 y.o.female underwent: Procedure(s) (LRB):  LAPAROSCOPY, DIAGNOSTIC with revision of JJ anastomosis (N/A). The patient tolerated the procedure well, was transferred to recovery post-op, and then transferred to the floor for continuation of medical care. The patient's clinical condition progressively improved. By the time of discharge, she was  tolerating a diet without nausea or vomiting, pain was well controlled with oral medications, and she was ambulating without difficulty. On POD 1 the patient was discharged to home. On discharge, the patient's incisions were c/d/i and the surgical site was soft and appropriately tender to palpation. The patient will follow up in Dr. Barnes's clinic in 2 weeks.       Goals of Care Treatment Preferences:  Code Status: Full Code      Consults:   Consults (From admission, onward)        Status Ordering Provider     Inpatient consult to General surgery  Once        Provider:  (Not yet assigned)    FAZAL Gallagher          Significant Diagnostic Studies: Labs:   BMP:   Recent Labs   Lab 07/04/23 1846 07/05/23  0439 07/06/23  0814   GLU 55* 86 88    140 138   K 3.7 3.9 3.6    108 104   CO2 19* 26 26   BUN 20 14 6   CREATININE 0.7 0.6 0.6   CALCIUM 9.3 8.2* 8.7   MG  --  1.8 1.7    and CBC   Recent Labs   Lab 07/04/23 1846 07/04/23 1852 07/05/23 0439 07/06/23  0814   WBC 10.62  --  7.43 8.11   HGB 9.9*  --  8.6* 9.1*   HCT 33.5*   < > 29.8* 31.6*     --  278 296    < > = values in this interval not displayed.       Pending Diagnostic Studies:     None        Final Active Diagnoses:    Diagnosis Date Noted POA    PRINCIPAL PROBLEM:  History of Yogesh-en-Y gastric bypass [Z98.84] 08/11/2014 Not Applicable      Problems Resolved During this Admission:      Discharged Condition: good    Disposition: Home or Self Care    Follow Up:   Follow-up Information     Davy Barnes MD Follow up in 2 week(s).    Specialties: General Surgery, Bariatrics  Contact information:  [facility] [address] [phone]                       Patient Instructions:   No discharge procedures on file.  Medications:  Reconciled Home Medications:      Medication List      ASK your doctor about these medications    amitriptyline 25 MG tablet  Commonly known as: ELAVIL  Take 1 tablet (25 mg  total) by mouth nightly as needed for Insomnia.     * ergocalciferol 50,000 unit Cap  Commonly known as: VITAMIN D2  Take 1 capsule (50,000 Units total) by mouth twice a week.     * ergocalciferol 50,000 unit Cap  Commonly known as: ERGOCALCIFEROL  Take 1 capsule (50,000 Units total) by mouth twice a week.     esomeprazole 40 mg Grps  Commonly known as: NexIUM Packet  Take 40 mg by mouth before breakfast. Open capsule and dissolve pellets in water     FLINTSTONES COMPLETE (IRON) Chew  Generic drug: pediatric multivit-iron-min  Take 1 tablet by mouth 2 (two) times daily.     oxyCODONE 5 MG immediate release tablet  Commonly known as: ROXICODONE  Take 1 tablet (5 mg total) by mouth every 4 (four) hours as needed for Pain.     polyethylene glycol 17 gram Pwpk  Commonly known as: GLYCOLAX  Take 17 g by mouth once daily.     promethazine 25 MG tablet  Commonly known as: PHENERGAN  Take 0.5 tablets (12.5 mg total) by mouth every 6 (six) hours as needed for Nausea.     QUEtiapine 25 MG Tab  Commonly known as: SEROQUEL  Take 50 mg by mouth nightly as needed.         * This list has 2 medication(s) that are the same as other medications prescribed for you. Read the directions carefully, and ask your doctor or other care provider to review them with you.              Time spent on the discharge of patient: 15 minutes    Sana Luna MD  General Surgery  [facility] - Surgery

## 2023-07-07 ENCOUNTER — PATIENT OUTREACH (OUTPATIENT)
Dept: ADMINISTRATIVE | Facility: CLINIC | Age: 43
End: 2023-07-07
Payer: COMMERCIAL

## 2023-07-07 DIAGNOSIS — M54.12 CERVICAL RADICULOPATHY: Primary | ICD-10-CM

## 2023-07-07 DIAGNOSIS — Z98.84 HISTORY OF ROUX-EN-Y GASTRIC BYPASS: ICD-10-CM

## 2023-07-07 NOTE — PROGRESS NOTES
C3 nurse spoke with Sarah Padron  for a TCC post hospital discharge follow up call. Nurse offered to scheduled TCC hospital follow-up appointment. The patient declined appointment at this time.

## 2023-07-07 NOTE — PLAN OF CARE
[facility]  Discharge Final Note    Primary Care Provider: Margarito Neil MD    Expected Discharge Date: 7/6/2023    Final Discharge Note (most recent)       Final Note - 07/06/23 1630          Final Note    Assessment Type Final Discharge Note     Anticipated Discharge Disposition Home or Self Care     Hospital Resources/Appts/Education Provided Provided patient/caregiver with written discharge plan information;Appointments scheduled by Navigator/Coordinator                   Future Appointments   Date Time Provider Department Center   7/27/2023  1:30 PM Davy Barnes MD [facility]        Contact Info       Davy Barnes MD   Specialty: General Surgery, Bariatrics    [address]  [facility] [address]   Phone: [phone]       Next Steps: Follow up in 2 week(s)

## 2023-07-14 ENCOUNTER — PES CALL (OUTPATIENT)
Dept: ADMINISTRATIVE | Facility: CLINIC | Age: 43
End: 2023-07-14
Payer: COMMERCIAL

## 2023-07-24 ENCOUNTER — OFFICE VISIT (OUTPATIENT)
Dept: HOME HEALTH SERVICES | Facility: CLINIC | Age: 43
End: 2023-07-24
Payer: COMMERCIAL

## 2023-07-24 DIAGNOSIS — Z98.84 HISTORY OF ROUX-EN-Y GASTRIC BYPASS: ICD-10-CM

## 2023-07-24 DIAGNOSIS — K45.8 INTERNAL HERNIA: Primary | ICD-10-CM

## 2023-07-24 PROCEDURE — 1111F PR DISCHARGE MEDS RECONCILED W/ CURRENT OUTPATIENT MED LIST: ICD-10-PCS | Mod: CPTII,95,, | Performed by: NURSE PRACTITIONER

## 2023-07-24 PROCEDURE — 99443 PR PHYSICIAN TELEPHONE EVALUATION 21-30 MIN: ICD-10-PCS | Mod: 95,,, | Performed by: NURSE PRACTITIONER

## 2023-07-24 PROCEDURE — 1160F RVW MEDS BY RX/DR IN RCRD: CPT | Mod: CPTII,95,, | Performed by: NURSE PRACTITIONER

## 2023-07-24 PROCEDURE — 99443 PR PHYSICIAN TELEPHONE EVALUATION 21-30 MIN: CPT | Mod: 95,,, | Performed by: NURSE PRACTITIONER

## 2023-07-24 PROCEDURE — 1159F MED LIST DOCD IN RCRD: CPT | Mod: CPTII,95,, | Performed by: NURSE PRACTITIONER

## 2023-07-24 PROCEDURE — 1111F DSCHRG MED/CURRENT MED MERGE: CPT | Mod: CPTII,95,, | Performed by: NURSE PRACTITIONER

## 2023-07-24 PROCEDURE — 1160F PR REVIEW ALL MEDS BY PRESCRIBER/CLIN PHARMACIST DOCUMENTED: ICD-10-PCS | Mod: CPTII,95,, | Performed by: NURSE PRACTITIONER

## 2023-07-24 PROCEDURE — 1159F PR MEDICATION LIST DOCUMENTED IN MEDICAL RECORD: ICD-10-PCS | Mod: CPTII,95,, | Performed by: NURSE PRACTITIONER

## 2023-07-24 NOTE — PROGRESS NOTES
[facility]  Established Patient - Audio Only Post-Hospitalization Telehealth Visit with [facility] Provider    Visit Date: 7/24/23   Encounter Provider: Fior Zuñiga NP  PCP:  Margarito Neil MD    PRESENTING HISTORY      Patient ID: Sarah Padron     Consult Requested By:  Dr. Babatunde Hopson  Reason for Consult:  Post-Hospitalization Telehealth Visit    Chief Complaint: Transitional Care     History of Present Illness:     Admission Date: 7/4/2023  Hospital Length of Stay: 2 days  Discharge Date and Time:  07/06/2023       _______________________________    Date of Service: 7/24/23        The patient location is: HOME  The chief complaint leading to consultation is: routine care for evaluation and management of chronic medial issues and medication review.     Visit type: Virtual visit with audio only (telephone)     The reason for the audio only service rather than synchronous audio and video virtual visit was related to technical difficulties or patient preference/necessity.     Each patient to whom I provide medical services by telemedicine is:  (1) informed of the relationship between the physician and patient and the respective role of any other health care provider with respect to management of the patient; and (2) notified that they may decline to receive medical services by telemedicine and may withdraw from such care at any time. Patient verbally consented to receive this service via voice-only telephone call.     Subjective:   Today:  Ms. Sarah Padron is a 43 y.o. female being evaluated by telephone today for  transitional care visit to the home environment post-discharge from inpatient hospitalization encounter described above. Patient presents at baseline state of health as reported by patient. Denies any acute issues, concerns or complaints to address on today's visit. Reports taking all medications as prescribed. No other needs identified at this time. Reports  understanding and willingness to comevaluation and management of chronic medical issues and medication review. Patient denies any chest pain, SOB, nausea, vomiting, diarrhea, constipation, fever, chills, cough, known COVID exposure or illness. Reports taking all medications as prescribed. No other acute needs identified at this time. The patient is provided contact information to call to discuss any presenting concerns, questions or complaints until our next visit. Refills for all maintenance medications are confirmed on file at the patient's pharrmacy of choice.     OBJECTIVE:   Vital Signs: None Taken for this visit      Physical Exam   Laboratory  Lab Results   Component Value Date    WBC 8.11 07/06/2023    HGB 9.1 (L) 07/06/2023    HCT 31.6 (L) 07/06/2023    MCV 77 (L) 07/06/2023     07/06/2023     No results found for: INR, PROTIME  Lab Results   Component Value Date    HGBA1C 5.7 06/27/2014     No results for input(s): POCTGLUCOSE in the last 72 hours.    TRANSITION OF CARE:     Family and/or Caretaker present at visit?  No.  Diagnostic tests reviewed/disposition: No diagnosic tests pending after this hospitalization.  Disease/illness education: Importance of Compliance with all prescribed medications and treatments, COVID Precautions/Social Distancing/Mask Use  Home health/community services discussion/referrals: Patient does not have home health established from hospital visit.  They do not need home health.  If needed, we will set up home health for the patient.   Establishment or re-establishment of referral orders for community resources: No other necessary community resources.   Discussion with other health care providers: No discussion with other health care providers necessary.     Transition of Care Visit:  I have reviewed and updated the history and problem list. I have reconciled the medication list. I have discussed the hospitalization and current medical issues, prognosis and plans with the  patient/family.     Medications Reconciliation:   I have reconciled the patient's home medications and discharge medications with the patient/family. I have updated all changes. Refer to After-Visit Medication List.    Discharge plans, follow-up instructions, future appointments, provider contact information, indicators to seek emergency treatment and encouragement to call for any questions, concerns or clarification of the patient's plan of care explained to patient and/or caregiver(s), whom confirm understanding of provided information and endorse willingness to comply.     Assessment:   Problem List Items Addressed on this Visit:  1. History of Yogesh-en-Y gastric bypass      Plan:     1. History of Yogesh-en-Y gastric bypass      Reports she is doing well and decline in home face to face visit, she verbalized understanding of keeping her appointment this week 7/27/23 with Dr. Barnes.  - [facility]  to schedule follow-up visit with patient PRN.    Patient Instructions Given:  - Continue all medications, treatments and therapies as ordered.   - Follow all instructions, recommendations as discussed.  - Maintain Safety Precautions at all times.  - Attend all medical appointments as scheduled.  - For worsening symptoms: call Primary Care Physician or Nurse Practitioner.  - For emergencies, call 911 or immediately report to the nearest emergency room.    After Visit Medication List :     Medication List            Accurate as of July 24, 2023 10:20 AM. If you have any questions, ask your nurse or doctor.                CONTINUE taking these medications      amitriptyline 25 MG tablet  Commonly known as: ELAVIL  Take 1 tablet (25 mg total) by mouth nightly as needed for Insomnia.     * ergocalciferol 50,000 unit Cap  Commonly known as: VITAMIN D2  Take 1 capsule (50,000 Units total) by mouth twice a week.     * ergocalciferol 50,000 unit Cap  Commonly known as: ERGOCALCIFEROL  Take 1 capsule (50,000 Units  total) by mouth twice a week.     esomeprazole 40 mg Grps  Commonly known as: NexIUM Packet  Take 40 mg by mouth before breakfast. Open capsule and dissolve pellets in water     gabapentin 300 MG capsule  Commonly known as: NEURONTIN  Take 1 capsule (300 mg total) by mouth 3 (three) times daily. for 14 days     oxyCODONE-acetaminophen 5-325 mg per tablet  Commonly known as: PERCOCET  Take 1 tablet by mouth every 4 (four) hours as needed for Pain.     pediatric multivit-iron-min Chew     polyethylene glycol 17 gram Pwpk  Commonly known as: GLYCOLAX  Take 17 g by mouth once daily.     promethazine 25 MG tablet  Commonly known as: PHENERGAN  Take 0.5 tablets (12.5 mg total) by mouth every 6 (six) hours as needed for Nausea.     QUEtiapine 25 MG Tab  Commonly known as: SEROQUEL           * This list has 2 medication(s) that are the same as other medications prescribed for you. Read the directions carefully, and ask your doctor or other care provider to review them with you.                Future Appointments   Date Time Provider Department Center   7/27/2023  1:30 PM [facility]     Risks of environmental exposure to coronavirus discussed including: social distancing, hand hygiene, and limiting departures from the home for necessities only. Reports understanding and willingness to comply.     Signature:    Fior Zuñiga, MSN, APRN, FNP-C  [facility]     Total time for this telephone encounter was 25 minutes. The following issues were discussed: primary and secondary diagnoses, co-morbidities, prescribed medications, treatment modalities, importance of compliance with medical advice and directives for follow-up care.    This service was not originating from a related E/M service provided within the previous 7 days nor will  to an E/M service or procedure within the next 24 hours or my soonest available appointment.  Prevailing standard of care was able to be met in this  audio-only visit.

## 2023-07-27 ENCOUNTER — OFFICE VISIT (OUTPATIENT)
Dept: SURGERY | Facility: CLINIC | Age: 43
End: 2023-07-27
Payer: COMMERCIAL

## 2023-07-27 VITALS
HEIGHT: 62 IN | SYSTOLIC BLOOD PRESSURE: 110 MMHG | HEART RATE: 71 BPM | BODY MASS INDEX: 29.15 KG/M2 | WEIGHT: 158.38 LBS | DIASTOLIC BLOOD PRESSURE: 56 MMHG

## 2023-07-27 DIAGNOSIS — Z09 POSTOP CHECK: Primary | ICD-10-CM

## 2023-07-27 PROCEDURE — 99999 PR PBB SHADOW E&M-EST. PATIENT-LVL III: ICD-10-PCS | Mod: PBBFAC,,, | Performed by: SURGERY

## 2023-07-27 PROCEDURE — 3078F DIAST BP <80 MM HG: CPT | Mod: CPTII,S$GLB,, | Performed by: SURGERY

## 2023-07-27 PROCEDURE — 99024 PR POST-OP FOLLOW-UP VISIT: ICD-10-PCS | Mod: S$GLB,,, | Performed by: SURGERY

## 2023-07-27 PROCEDURE — 3008F BODY MASS INDEX DOCD: CPT | Mod: CPTII,S$GLB,, | Performed by: SURGERY

## 2023-07-27 PROCEDURE — 3078F PR MOST RECENT DIASTOLIC BLOOD PRESSURE < 80 MM HG: ICD-10-PCS | Mod: CPTII,S$GLB,, | Performed by: SURGERY

## 2023-07-27 PROCEDURE — 3074F PR MOST RECENT SYSTOLIC BLOOD PRESSURE < 130 MM HG: ICD-10-PCS | Mod: CPTII,S$GLB,, | Performed by: SURGERY

## 2023-07-27 PROCEDURE — 99024 POSTOP FOLLOW-UP VISIT: CPT | Mod: S$GLB,,, | Performed by: SURGERY

## 2023-07-27 PROCEDURE — 1159F MED LIST DOCD IN RCRD: CPT | Mod: CPTII,S$GLB,, | Performed by: SURGERY

## 2023-07-27 PROCEDURE — 3074F SYST BP LT 130 MM HG: CPT | Mod: CPTII,S$GLB,, | Performed by: SURGERY

## 2023-07-27 PROCEDURE — 1160F PR REVIEW ALL MEDS BY PRESCRIBER/CLIN PHARMACIST DOCUMENTED: ICD-10-PCS | Mod: CPTII,S$GLB,, | Performed by: SURGERY

## 2023-07-27 PROCEDURE — 99999 PR PBB SHADOW E&M-EST. PATIENT-LVL III: CPT | Mod: PBBFAC,,, | Performed by: SURGERY

## 2023-07-27 PROCEDURE — 1159F PR MEDICATION LIST DOCUMENTED IN MEDICAL RECORD: ICD-10-PCS | Mod: CPTII,S$GLB,, | Performed by: SURGERY

## 2023-07-27 PROCEDURE — 1160F RVW MEDS BY RX/DR IN RCRD: CPT | Mod: CPTII,S$GLB,, | Performed by: SURGERY

## 2023-07-27 PROCEDURE — 3008F PR BODY MASS INDEX (BMI) DOCUMENTED: ICD-10-PCS | Mod: CPTII,S$GLB,, | Performed by: SURGERY

## 2023-07-27 NOTE — PROGRESS NOTES
I have seen the patient, reviewed the Student's history and physical, assessment and plan. I have personally interviewed and examined the patient at bedside and: agree with the findings.     S/p jj revision for intussusception 7/5/23.  She is doing well.  Wounds clear.  Regular duty and follow up prn.

## 2023-07-27 NOTE — MEDICAL/APP STUDENT
Surgery Clinic Note - H and P    Subjective:     Sarah Padron is a 43 y.o. female with h/o migraines, alcohol use disorder (in remission) and anxiety who presents to clinic for her post-op appointment. She's s/p jj revision for intussusception 7/5/23.    The patient explains that she's feeling good, and not experiencing any pain. Tolerating food and drink and on her regular diet. Denies nausea, vomiting and constipation      The patient reports that she is tolerating a regular diet and having regular bowel movements.  She denies fever or chills.      PMH: History reviewed. No pertinent past medical history.    Past Surgical History:   Past Surgical History:   Procedure Laterality Date    ABDOMINAL SURGERY      BREAST SURGERY      lift    CHOLECYSTECTOMY      DIAGNOSTIC LAPAROSCOPY N/A 5/18/2023    Procedure: LAPAROSCOPY, DIAGNOSTIC;  Surgeon: Davy Barnes MD;  Location: [address];  Service: General;  Laterality: N/A;  REPAIR INTERNAL HERNIA    DIAGNOSTIC LAPAROSCOPY N/A 7/5/2023    Procedure: LAPAROSCOPY, DIAGNOSTIC with revision of JJ anastomosis;  Surgeon: Davy Barnes MD;  Location: [facility] OR [address] [place] [address];  Service: General;  Laterality: N/A;    EYE SURGERY Right     cataract    GASTRIC BYPASS      HERNIA REPAIR N/A 2016    internal    lap band removal  7/9/14    LAPAROSCOPIC GASTRIC BANDING      LAPAROSCOPIC GASTRIC BANDING  2008    revision       Social History:  Social History     Socioeconomic History    Marital status:    Tobacco Use    Smoking status: Never    Smokeless tobacco: Never   Substance and Sexual Activity    Alcohol use: Yes     Comment: ocassional; wine    Drug use: No    Sexual activity: Yes     Partners: Male     Social Determinants of Health     Financial Resource Strain: Low Risk     Difficulty of Paying Living Expenses: Not very hard   Food Insecurity: No Food Insecurity    Worried About Running Out of Food in the Last Year: Never true    Ran Out of  Food in the Last Year: Never true   Transportation Needs: No Transportation Needs    Lack of Transportation (Medical): No    Lack of Transportation (Non-Medical): No   Social Connections: Unknown    Frequency of Communication with Friends and Family: More than three times a week    Frequency of Social Gatherings with Friends and Family: More than three times a week    Marital Status:    Housing Stability: Unknown    Unable to Pay for Housing in the Last Year: No    Unstable Housing in the Last Year: No       Allergies:   Review of patient's allergies indicates:   Allergen Reactions    Dilaudid [hydromorphone] Itching    Meperidine Nausea And Vomiting and Itching     Other reaction(s): Other (See Comments)       Medications:  Current Outpatient Medications:     ergocalciferol (ERGOCALCIFEROL) 50,000 unit Cap, Take 1 capsule (50,000 Units total) by mouth twice a week., Disp: 24 capsule, Rfl: 0    ergocalciferol (VITAMIN D2) 50,000 unit Cap, Take 1 capsule (50,000 Units total) by mouth twice a week., Disp: 24 capsule, Rfl: 0    esomeprazole (NEXIUM PACKET) 40 mg GrPS, Take 40 mg by mouth before breakfast. Open capsule and dissolve pellets in water, Disp: 30 each, Rfl: 11    pediatric multivit-iron-min Chew, Take 1 tablet by mouth 2 (two) times daily., Disp: , Rfl:     polyethylene glycol (GLYCOLAX) 17 gram PwPk, Take 17 g by mouth once daily., Disp: , Rfl: 0    promethazine (PHENERGAN) 25 MG tablet, Take 0.5 tablets (12.5 mg total) by mouth every 6 (six) hours as needed for Nausea., Disp: 15 tablet, Rfl: 0    QUEtiapine (SEROQUEL) 25 MG Tab, Take 50 mg by mouth nightly as needed., Disp: , Rfl:     amitriptyline (ELAVIL) 25 MG tablet, Take 1 tablet (25 mg total) by mouth nightly as needed for Insomnia., Disp: 30 tablet, Rfl: 2    gabapentin (NEURONTIN) 300 MG capsule, Take 1 capsule (300 mg total) by mouth 3 (three) times daily. for 14 days (Patient not taking: Reported on 7/27/2023), Disp: 42 capsule, Rfl: 0     oxyCODONE-acetaminophen (PERCOCET) 5-325 mg per tablet, Take 1 tablet by mouth every 4 (four) hours as needed for Pain. (Patient not taking: Reported on 7/27/2023), Disp: 10 tablet, Rfl: 0    Current Outpatient Medications on File Prior to Visit   Medication Sig Dispense Refill    ergocalciferol (ERGOCALCIFEROL) 50,000 unit Cap Take 1 capsule (50,000 Units total) by mouth twice a week. 24 capsule 0    ergocalciferol (VITAMIN D2) 50,000 unit Cap Take 1 capsule (50,000 Units total) by mouth twice a week. 24 capsule 0    esomeprazole (NEXIUM PACKET) 40 mg GrPS Take 40 mg by mouth before breakfast. Open capsule and dissolve pellets in water 30 each 11    pediatric multivit-iron-min Chew Take 1 tablet by mouth 2 (two) times daily.      polyethylene glycol (GLYCOLAX) 17 gram PwPk Take 17 g by mouth once daily.  0    promethazine (PHENERGAN) 25 MG tablet Take 0.5 tablets (12.5 mg total) by mouth every 6 (six) hours as needed for Nausea. 15 tablet 0    QUEtiapine (SEROQUEL) 25 MG Tab Take 50 mg by mouth nightly as needed.      amitriptyline (ELAVIL) 25 MG tablet Take 1 tablet (25 mg total) by mouth nightly as needed for Insomnia. 30 tablet 2    gabapentin (NEURONTIN) 300 MG capsule Take 1 capsule (300 mg total) by mouth 3 (three) times daily. for 14 days (Patient not taking: Reported on 7/27/2023) 42 capsule 0    oxyCODONE-acetaminophen (PERCOCET) 5-325 mg per tablet Take 1 tablet by mouth every 4 (four) hours as needed for Pain. (Patient not taking: Reported on 7/27/2023) 10 tablet 0     No current facility-administered medications on file prior to visit.         Objective:     PHYSICAL EXAM:  Vital Signs (Most Recent)  Pulse: 71 (07/27/23 1341)  BP: (!) 110/56 (07/27/23 1341)    Review of Systems   Constitutional: Negative.    HENT: Negative.     Eyes: Negative.    Respiratory: Negative.     Cardiovascular: Negative.    Gastrointestinal: Negative.    Genitourinary: Negative.    Musculoskeletal: Negative.    Skin:  Negative.    Neurological: Negative.    Endo/Heme/Allergies: Negative.    Psychiatric/Behavioral: Negative.    A 10+ review of systems was performed with pertinent positives and negatives noted above in the history of present illness.  Other systems were negative unless otherwise specified.    Physical Exam  Constitutional:       Appearance: Normal appearance.   Abdominal:      General: Abdomen is flat. Bowel sounds are normal.      Palpations: Abdomen is soft.      Tenderness: There is no abdominal tenderness.   Neurological:      Mental Status: She is alert.        Pathology- reviewed    Specimens (From admission, onward)      None                 Pertinent imaging/labs:   NA      Assessment:     43 y.o. female presents to clinic for her post-op appointment. She's s/p jj revision for intussusception 7/5/23.  She feels well and is not experiencing any abdominal pain, tenderness or signs of infection at her incision sites.     Plan:     -  Clear to resume regular activities.    Randi Cosme MS3  [facility]

## 2023-08-11 ENCOUNTER — PATIENT MESSAGE (OUTPATIENT)
Dept: SURGERY | Facility: CLINIC | Age: 43
End: 2023-08-11
Payer: COMMERCIAL

## 2023-09-01 ENCOUNTER — PATIENT MESSAGE (OUTPATIENT)
Dept: ADMINISTRATIVE | Facility: OTHER | Age: 43
End: 2023-09-01
Payer: COMMERCIAL

## 2023-09-23 ENCOUNTER — HOSPITAL ENCOUNTER (INPATIENT)
Facility: HOSPITAL | Age: 43
LOS: 1 days | Discharge: HOME | DRG: 330 | End: 2023-09-25
Attending: EMERGENCY MEDICINE | Admitting: SURGERY
Payer: COMMERCIAL

## 2023-09-23 DIAGNOSIS — K56.600 PARTIAL SMALL BOWEL OBSTRUCTION: ICD-10-CM

## 2023-09-23 DIAGNOSIS — Z98.84 HISTORY OF ROUX-EN-Y GASTRIC BYPASS: Primary | ICD-10-CM

## 2023-09-23 LAB
ALBUMIN SERPL BCP-MCNC: 4.4 G/DL (ref 3.5–5.2)
ALP SERPL-CCNC: 52 U/L (ref 55–135)
ALT SERPL W/O P-5'-P-CCNC: 17 U/L (ref 10–44)
ANION GAP SERPL CALC-SCNC: 10 MMOL/L (ref 8–16)
AST SERPL-CCNC: 18 U/L (ref 10–40)
B-HCG UR QL: NEGATIVE
BASOPHILS # BLD AUTO: 0.06 K/UL (ref 0–0.2)
BASOPHILS NFR BLD: 0.9 % (ref 0–1.9)
BILIRUB SERPL-MCNC: 0.7 MG/DL (ref 0.1–1)
BUN SERPL-MCNC: 11 MG/DL (ref 6–20)
CALCIUM SERPL-MCNC: 9.7 MG/DL (ref 8.7–10.5)
CHLORIDE SERPL-SCNC: 103 MMOL/L (ref 95–110)
CO2 SERPL-SCNC: 25 MMOL/L (ref 23–29)
CREAT SERPL-MCNC: 0.7 MG/DL (ref 0.5–1.4)
CTP QC/QA: YES
DIFFERENTIAL METHOD: ABNORMAL
EOSINOPHIL # BLD AUTO: 0 K/UL (ref 0–0.5)
EOSINOPHIL NFR BLD: 0.4 % (ref 0–8)
ERYTHROCYTE [DISTWIDTH] IN BLOOD BY AUTOMATED COUNT: 18.6 % (ref 11.5–14.5)
EST. GFR  (NO RACE VARIABLE): >60 ML/MIN/1.73 M^2
GLUCOSE SERPL-MCNC: 80 MG/DL (ref 70–110)
HCT VFR BLD AUTO: 34.7 % (ref 37–48.5)
HGB BLD-MCNC: 10.1 G/DL (ref 12–16)
IMM GRANULOCYTES # BLD AUTO: 0.02 K/UL (ref 0–0.04)
IMM GRANULOCYTES NFR BLD AUTO: 0.3 % (ref 0–0.5)
LACTATE SERPL-SCNC: 0.6 MMOL/L (ref 0.5–2.2)
LIPASE SERPL-CCNC: 13 U/L (ref 4–60)
LYMPHOCYTES # BLD AUTO: 1.2 K/UL (ref 1–4.8)
LYMPHOCYTES NFR BLD: 17.6 % (ref 18–48)
MCH RBC QN AUTO: 21.2 PG (ref 27–31)
MCHC RBC AUTO-ENTMCNC: 29.1 G/DL (ref 32–36)
MCV RBC AUTO: 73 FL (ref 82–98)
MONOCYTES # BLD AUTO: 0.4 K/UL (ref 0.3–1)
MONOCYTES NFR BLD: 5.8 % (ref 4–15)
NEUTROPHILS # BLD AUTO: 5.2 K/UL (ref 1.8–7.7)
NEUTROPHILS NFR BLD: 75 % (ref 38–73)
NRBC BLD-RTO: 0 /100 WBC
PLATELET # BLD AUTO: 368 K/UL (ref 150–450)
PMV BLD AUTO: 10.1 FL (ref 9.2–12.9)
POTASSIUM SERPL-SCNC: 3.8 MMOL/L (ref 3.5–5.1)
PROT SERPL-MCNC: 7.7 G/DL (ref 6–8.4)
RBC # BLD AUTO: 4.76 M/UL (ref 4–5.4)
SODIUM SERPL-SCNC: 138 MMOL/L (ref 136–145)
WBC # BLD AUTO: 6.92 K/UL (ref 3.9–12.7)

## 2023-09-23 PROCEDURE — 96365 THER/PROPH/DIAG IV INF INIT: CPT

## 2023-09-23 PROCEDURE — 83690 ASSAY OF LIPASE: CPT

## 2023-09-23 PROCEDURE — 96376 TX/PRO/DX INJ SAME DRUG ADON: CPT

## 2023-09-23 PROCEDURE — 25500020 PHARM REV CODE 255: Performed by: EMERGENCY MEDICINE

## 2023-09-23 PROCEDURE — 63600175 PHARM REV CODE 636 W HCPCS

## 2023-09-23 PROCEDURE — 81025 URINE PREGNANCY TEST: CPT

## 2023-09-23 PROCEDURE — 25000003 PHARM REV CODE 250

## 2023-09-23 PROCEDURE — 96375 TX/PRO/DX INJ NEW DRUG ADDON: CPT

## 2023-09-23 PROCEDURE — 83605 ASSAY OF LACTIC ACID: CPT

## 2023-09-23 PROCEDURE — 99285 EMERGENCY DEPT VISIT HI MDM: CPT | Mod: 25

## 2023-09-23 PROCEDURE — 96361 HYDRATE IV INFUSION ADD-ON: CPT

## 2023-09-23 PROCEDURE — G0378 HOSPITAL OBSERVATION PER HR: HCPCS

## 2023-09-23 PROCEDURE — 94761 N-INVAS EAR/PLS OXIMETRY MLT: CPT

## 2023-09-23 PROCEDURE — 85025 COMPLETE CBC W/AUTO DIFF WBC: CPT

## 2023-09-23 PROCEDURE — 63600175 PHARM REV CODE 636 W HCPCS: Performed by: STUDENT IN AN ORGANIZED HEALTH CARE EDUCATION/TRAINING PROGRAM

## 2023-09-23 PROCEDURE — 25000003 PHARM REV CODE 250: Performed by: STUDENT IN AN ORGANIZED HEALTH CARE EDUCATION/TRAINING PROGRAM

## 2023-09-23 PROCEDURE — 96372 THER/PROPH/DIAG INJ SC/IM: CPT | Mod: 59

## 2023-09-23 PROCEDURE — C9113 INJ PANTOPRAZOLE SODIUM, VIA: HCPCS | Performed by: STUDENT IN AN ORGANIZED HEALTH CARE EDUCATION/TRAINING PROGRAM

## 2023-09-23 PROCEDURE — 96372 THER/PROPH/DIAG INJ SC/IM: CPT | Performed by: STUDENT IN AN ORGANIZED HEALTH CARE EDUCATION/TRAINING PROGRAM

## 2023-09-23 PROCEDURE — 80053 COMPREHEN METABOLIC PANEL: CPT

## 2023-09-23 RX ORDER — DICYCLOMINE HYDROCHLORIDE 10 MG/ML
20 INJECTION INTRAMUSCULAR
Status: COMPLETED | OUTPATIENT
Start: 2023-09-23 | End: 2023-09-23

## 2023-09-23 RX ORDER — ACETAMINOPHEN 10 MG/ML
1000 INJECTION, SOLUTION INTRAVENOUS EVERY 8 HOURS
Status: DISCONTINUED | OUTPATIENT
Start: 2023-09-23 | End: 2023-09-24

## 2023-09-23 RX ORDER — OXYCODONE HYDROCHLORIDE 5 MG/1
5 TABLET ORAL EVERY 4 HOURS PRN
Status: DISCONTINUED | OUTPATIENT
Start: 2023-09-23 | End: 2023-09-23

## 2023-09-23 RX ORDER — OXYCODONE HYDROCHLORIDE 10 MG/1
10 TABLET ORAL EVERY 4 HOURS PRN
Status: DISCONTINUED | OUTPATIENT
Start: 2023-09-23 | End: 2023-09-25 | Stop reason: HOSPADM

## 2023-09-23 RX ORDER — ENOXAPARIN SODIUM 100 MG/ML
40 INJECTION SUBCUTANEOUS EVERY 24 HOURS
Status: DISCONTINUED | OUTPATIENT
Start: 2023-09-23 | End: 2023-09-25 | Stop reason: HOSPADM

## 2023-09-23 RX ORDER — QUETIAPINE FUMARATE 25 MG/1
50 TABLET, FILM COATED ORAL NIGHTLY PRN
Status: DISCONTINUED | OUTPATIENT
Start: 2023-09-23 | End: 2023-09-25 | Stop reason: HOSPADM

## 2023-09-23 RX ORDER — ACETAMINOPHEN 500 MG
1000 TABLET ORAL EVERY 8 HOURS
Status: DISCONTINUED | OUTPATIENT
Start: 2023-09-23 | End: 2023-09-23

## 2023-09-23 RX ORDER — HYDROMORPHONE HYDROCHLORIDE 1 MG/ML
0.5 INJECTION, SOLUTION INTRAMUSCULAR; INTRAVENOUS; SUBCUTANEOUS EVERY 6 HOURS PRN
Status: DISCONTINUED | OUTPATIENT
Start: 2023-09-23 | End: 2023-09-24

## 2023-09-23 RX ORDER — MORPHINE SULFATE 4 MG/ML
4 INJECTION, SOLUTION INTRAMUSCULAR; INTRAVENOUS
Status: COMPLETED | OUTPATIENT
Start: 2023-09-23 | End: 2023-09-23

## 2023-09-23 RX ORDER — MORPHINE SULFATE 2 MG/ML
6 INJECTION, SOLUTION INTRAMUSCULAR; INTRAVENOUS
Status: COMPLETED | OUTPATIENT
Start: 2023-09-23 | End: 2023-09-23

## 2023-09-23 RX ORDER — HYDROMORPHONE HYDROCHLORIDE 1 MG/ML
0.5 INJECTION, SOLUTION INTRAMUSCULAR; INTRAVENOUS; SUBCUTANEOUS ONCE
Status: COMPLETED | OUTPATIENT
Start: 2023-09-23 | End: 2023-09-23

## 2023-09-23 RX ORDER — DIPHENHYDRAMINE HYDROCHLORIDE 50 MG/ML
12.5 INJECTION INTRAMUSCULAR; INTRAVENOUS
Status: COMPLETED | OUTPATIENT
Start: 2023-09-23 | End: 2023-09-23

## 2023-09-23 RX ORDER — GABAPENTIN 300 MG/1
300 CAPSULE ORAL 3 TIMES DAILY
Status: DISCONTINUED | OUTPATIENT
Start: 2023-09-23 | End: 2023-09-25 | Stop reason: HOSPADM

## 2023-09-23 RX ORDER — OXYCODONE HYDROCHLORIDE 5 MG/1
5 TABLET ORAL EVERY 4 HOURS PRN
Status: DISCONTINUED | OUTPATIENT
Start: 2023-09-23 | End: 2023-09-25 | Stop reason: HOSPADM

## 2023-09-23 RX ORDER — LIDOCAINE HYDROCHLORIDE 10 MG/ML
1 INJECTION, SOLUTION EPIDURAL; INFILTRATION; INTRACAUDAL; PERINEURAL ONCE AS NEEDED
Status: DISCONTINUED | OUTPATIENT
Start: 2023-09-23 | End: 2023-09-25 | Stop reason: HOSPADM

## 2023-09-23 RX ORDER — MORPHINE SULFATE 4 MG/ML
4 INJECTION, SOLUTION INTRAMUSCULAR; INTRAVENOUS
Status: DISCONTINUED | OUTPATIENT
Start: 2023-09-23 | End: 2023-09-23

## 2023-09-23 RX ORDER — PANTOPRAZOLE SODIUM 40 MG/10ML
40 INJECTION, POWDER, LYOPHILIZED, FOR SOLUTION INTRAVENOUS DAILY
Status: DISCONTINUED | OUTPATIENT
Start: 2023-09-23 | End: 2023-09-25 | Stop reason: HOSPADM

## 2023-09-23 RX ORDER — HYDROMORPHONE HYDROCHLORIDE 1 MG/ML
1 INJECTION, SOLUTION INTRAMUSCULAR; INTRAVENOUS; SUBCUTANEOUS ONCE
Status: COMPLETED | OUTPATIENT
Start: 2023-09-23 | End: 2023-09-23

## 2023-09-23 RX ORDER — DEXTROSE MONOHYDRATE, SODIUM CHLORIDE, AND POTASSIUM CHLORIDE 50; 1.49; 4.5 G/1000ML; G/1000ML; G/1000ML
INJECTION, SOLUTION INTRAVENOUS CONTINUOUS
Status: DISCONTINUED | OUTPATIENT
Start: 2023-09-23 | End: 2023-09-25

## 2023-09-23 RX ORDER — ONDANSETRON 2 MG/ML
4 INJECTION INTRAMUSCULAR; INTRAVENOUS EVERY 6 HOURS PRN
Status: DISCONTINUED | OUTPATIENT
Start: 2023-09-23 | End: 2023-09-25 | Stop reason: HOSPADM

## 2023-09-23 RX ORDER — ONDANSETRON 2 MG/ML
4 INJECTION INTRAMUSCULAR; INTRAVENOUS
Status: COMPLETED | OUTPATIENT
Start: 2023-09-23 | End: 2023-09-23

## 2023-09-23 RX ORDER — SODIUM CHLORIDE 0.9 % (FLUSH) 0.9 %
10 SYRINGE (ML) INJECTION
Status: DISCONTINUED | OUTPATIENT
Start: 2023-09-23 | End: 2023-09-25 | Stop reason: HOSPADM

## 2023-09-23 RX ADMIN — OXYCODONE HYDROCHLORIDE 10 MG: 10 TABLET ORAL at 04:09

## 2023-09-23 RX ADMIN — MORPHINE SULFATE 4 MG: 4 INJECTION INTRAVENOUS at 09:09

## 2023-09-23 RX ADMIN — MORPHINE SULFATE 6 MG: 2 INJECTION, SOLUTION INTRAMUSCULAR; INTRAVENOUS at 12:09

## 2023-09-23 RX ADMIN — HYDROMORPHONE HYDROCHLORIDE 1 MG: 1 INJECTION, SOLUTION INTRAMUSCULAR; INTRAVENOUS; SUBCUTANEOUS at 08:09

## 2023-09-23 RX ADMIN — MORPHINE SULFATE 4 MG: 4 INJECTION INTRAVENOUS at 08:09

## 2023-09-23 RX ADMIN — DEXTROSE, SODIUM CHLORIDE, AND POTASSIUM CHLORIDE: 5; .45; .15 INJECTION INTRAVENOUS at 04:09

## 2023-09-23 RX ADMIN — ONDANSETRON 4 MG: 2 INJECTION INTRAMUSCULAR; INTRAVENOUS at 08:09

## 2023-09-23 RX ADMIN — METHOCARBAMOL 500 MG: 100 INJECTION, SOLUTION INTRAMUSCULAR; INTRAVENOUS at 08:09

## 2023-09-23 RX ADMIN — PANTOPRAZOLE SODIUM 40 MG: 40 INJECTION, POWDER, FOR SOLUTION INTRAVENOUS at 04:09

## 2023-09-23 RX ADMIN — DIPHENHYDRAMINE HYDROCHLORIDE 12.5 MG: 50 INJECTION, SOLUTION INTRAMUSCULAR; INTRAVENOUS at 12:09

## 2023-09-23 RX ADMIN — ONDANSETRON 4 MG: 2 INJECTION INTRAMUSCULAR; INTRAVENOUS at 07:09

## 2023-09-23 RX ADMIN — SODIUM CHLORIDE 1000 ML: 9 INJECTION, SOLUTION INTRAVENOUS at 08:09

## 2023-09-23 RX ADMIN — ENOXAPARIN SODIUM 40 MG: 40 INJECTION SUBCUTANEOUS at 04:09

## 2023-09-23 RX ADMIN — GABAPENTIN 300 MG: 300 CAPSULE ORAL at 04:09

## 2023-09-23 RX ADMIN — IOHEXOL 75 ML: 350 INJECTION, SOLUTION INTRAVENOUS at 12:09

## 2023-09-23 RX ADMIN — DIPHENHYDRAMINE HYDROCHLORIDE 12.5 MG: 50 INJECTION, SOLUTION INTRAMUSCULAR; INTRAVENOUS at 08:09

## 2023-09-23 RX ADMIN — DICYCLOMINE HYDROCHLORIDE 20 MG: 20 INJECTION, SOLUTION INTRAMUSCULAR at 11:09

## 2023-09-23 RX ADMIN — ACETAMINOPHEN 1000 MG: 10 INJECTION, SOLUTION INTRAVENOUS at 09:09

## 2023-09-23 RX ADMIN — HYDROMORPHONE HYDROCHLORIDE 0.5 MG: 1 INJECTION, SOLUTION INTRAMUSCULAR; INTRAVENOUS; SUBCUTANEOUS at 04:09

## 2023-09-23 NOTE — ED NOTES
Patient identifiers verified and correct for Sarah Padron  LOC: The patient is AAO x3   APPEARANCE: Patient appears uncomfortable but in no acute distress  SKIN: The skin is warm and dry, color consistent with ethnicity, patient has normal skin turgor and moist mucus membranes, skin intact, no breakdown or bruising noted. Incision scar noted to abd  MUSCULOSKELETAL: Patient moving all extremities spontaneously, no swelling noted.  RESPIRATORY: Airway is open and patent, respirations are spontaneous, patient has a normal effort and rate, no accessory muscle use noted, O2 sats noted at 100% on room air.  CARDIAC: Patient has a tachy rate, no edema noted, capillary refill < 3 seconds.   GASTRO: Soft and tender to palpation, no distention noted, normoactive bowel sounds present in all four quadrants. Pt states bowel movements have been regular. Pt c/o 8/10 abd pain  : Pt denies any pain or frequency with urination.  NEURO: Pt opens eyes spontaneously, behavior appropriate to situation, follows commands, facial expression symmetrical, bilateral hand grasp equal and even, purposeful motor response noted, normal sensation in all extremities when touched with a finger.

## 2023-09-23 NOTE — PLAN OF CARE
Problem: Adult Inpatient Plan of Care  Goal: Plan of Care Review  Outcome: Ongoing, Progressing  Goal: Patient-Specific Goal (Individualized)  Outcome: Ongoing, Progressing  Goal: Absence of Hospital-Acquired Illness or Injury  Outcome: Ongoing, Progressing  Goal: Optimal Comfort and Wellbeing  Outcome: Ongoing, Progressing  Goal: Readiness for Transition of Care  Outcome: Ongoing, Progressing     Problem: Pain Acute  Goal: Acceptable Pain Control and Functional Ability  Outcome: Ongoing, Progressing     Problem: Fluid Deficit (Intestinal Obstruction)  Goal: Fluid Balance  Outcome: Ongoing, Progressing     Problem: Infection (Intestinal Obstruction)  Goal: Absence of Infection Signs and Symptoms  Outcome: Ongoing, Progressing     Problem: Nausea and Vomiting (Intestinal Obstruction)  Goal: Nausea and Vomiting Relief  Outcome: Ongoing, Progressing     Problem: Pain (Intestinal Obstruction)  Goal: Acceptable Pain Control  Outcome: Ongoing, Progressing

## 2023-09-23 NOTE — HPI
Sarah Padron is our 43 y.o. female with PMH obesity s/p lap band in 2008, lap band removal with conversion to bypass in 2014, ex lap with reduction of internal hernia in 2016, diagnostic lap with reduction of internal hernia 5/2023, and diagnostic lap with JJ anastomosis revision for intussusception in 7/2023 presenting with acute onset mid abdominal pain since 0900 yesterday. She reports that the pain is constant and sharp. She has had rare, mild nausea when belching and denies episodes of emesis. She has not eaten since that morning, but she is keeping fluids down. She presented to the [facility] ED yesterday and reportedly had an unremarkable CT scan. Given her continued pain today, she presented to our ED. Workup here demonstrating no leukocytosis but a mild left shift, normal electrolytes, normal Cr, normal lipase, and moderate small bowel dilation from the level of her JJ anastomosis and proximal.

## 2023-09-23 NOTE — ASSESSMENT & PLAN NOTE
44 yo F with an extensive bariatric surgery history presenting with 36 hours of constant mid-abdominal pain and moderately dilated small bowel on imaging. Differential includes partial small bowel obstruction secondary to anastomotic stricturing, small bowel obstruction secondary to adhesive disease, or gastroenteritis. She is hemodynamically stable without significant lab abnormalities and she is not nauseated. Will plan to admit to obs with PO challenge and serial abdominal exams.    -Admit to obs under the bariatric surgery service  -Will start bariatric clears for PO challenge. Follow up tolerance   -Serial abdominal exams  -PRN PO analgesia and antiemetics  -Daily labs  -Home meds  -DVT ppx

## 2023-09-23 NOTE — PLAN OF CARE
.[facility] Plan of Care Note    Dx: Abdominal Pain     Shift Events: None       Goals of Care: Pain management, IV hydration, promote fluids.     Neuro: AAO x 4     Vital Signs: VSS on RA    Respiratory: WDL     Diet: Clear liquids     Is patient tolerating current diet? No     GTTS: D5 1/2 NS 20K @ 75 ml/hr     Urine Output/Bowel Movement: Adequate urine output, LBM 9/21/23     Drains/Tubes/Tube Feeds (include total output/shift): None     Lines: 20g R AC      Accuchecks: None     Skin: Healed abdominal incision from previous surgeries.     Fall Risk Score: 0     Activity level?  Independent     Any scheduled procedures? No     Any safety concerns? None     Other:

## 2023-09-23 NOTE — ED TRIAGE NOTES
Pt arriving to ED c/o abd pain, Started yesterday morning, 8/10 denies N/V states has had several gastric bypasses in the past. [facility] has had this pain in May

## 2023-09-23 NOTE — ED PROVIDER NOTES
Encounter Date: 9/23/2023       History     Chief Complaint   Patient presents with    Abdominal Pain     Started yesterday morning, 8/10 denies N/V states has had several gastric bypasses in the past      Patient is a 43-year-old female significant abdominal history of gastric bypass (07/23) , cholecystectomy and history intussusception to the emergency department due to intractable abdominal discomfort onset yesterday at 9:00 a.m..  Patient reports it is primarily located in epigastric region occasionally radiates towards the back.  Also complains of nausea and vomiting associated with these symptoms.  Patient was seen yesterday at [facility] for the same presentation.  She had lab work and CT scan without remarkable findings.  Patient reports given pain control however this morning feels as if the pain has gotten worse.  She denies any fever, changes in bowel movements or dysuria or hematuria.      Review of patient's allergies indicates:   Allergen Reactions    Dilaudid [hydromorphone] Itching    Meperidine Nausea And Vomiting and Itching     Other reaction(s): Other (See Comments)     History reviewed. No pertinent past medical history.  Past Surgical History:   Procedure Laterality Date    ABDOMINAL SURGERY      BREAST SURGERY      lift    CHOLECYSTECTOMY      DIAGNOSTIC LAPAROSCOPY N/A 5/18/2023    Procedure: LAPAROSCOPY, DIAGNOSTIC;  Surgeon: Davy Barnes MD;  Location: [address];  Service: General;  Laterality: N/A;  REPAIR INTERNAL HERNIA    DIAGNOSTIC LAPAROSCOPY N/A 7/5/2023    Procedure: LAPAROSCOPY, DIAGNOSTIC with revision of JJ anastomosis;  Surgeon: Davy Barnes MD;  Location: [address];  Service: General;  Laterality: N/A;    EYE SURGERY Right     cataract    GASTRIC BYPASS      HERNIA REPAIR N/A 2016    internal    lap band removal  7/9/14    LAPAROSCOPIC GASTRIC BANDING      LAPAROSCOPIC GASTRIC BANDING  2008    revision     Family History   Problem  Relation Age of Onset    Hypertension Mother     Hypertension Father     Obesity Father      Social History     Tobacco Use    Smoking status: Never    Smokeless tobacco: Never   Substance Use Topics    Alcohol use: Yes     Comment: ocassional; wine    Drug use: No     Review of Systems  See HPI  Physical Exam     Initial Vitals [09/23/23 0728]   BP Pulse Resp Temp SpO2   (!) 117/59 104 20 98 °F (36.7 °C) 100 %      MAP       --         Physical Exam    Vitals reviewed.  Constitutional:   In general discomfort   HENT:   Head: Normocephalic and atraumatic.   Eyes: Conjunctivae and EOM are normal.   Neck:   Normal range of motion.  Cardiovascular:  Normal rate and regular rhythm.           Pulmonary/Chest: Breath sounds normal. No respiratory distress. She has no wheezes. She has no rales.   Abdominal: Abdomen is soft. She exhibits no distension. There is abdominal tenderness (Tenderness primarily located in epigastric and right upper quadrant). There is no rebound.   Musculoskeletal:         General: No tenderness. Normal range of motion.      Cervical back: Normal range of motion.     Neurological: She is alert and oriented to person, place, and time.   Skin: Skin is warm and dry.   Psychiatric: She has a normal mood and affect. Thought content normal.         ED Course   Procedures  Labs Reviewed   CBC W/ AUTO DIFFERENTIAL - Abnormal; Notable for the following components:       Result Value    Hemoglobin 10.1 (*)     Hematocrit 34.7 (*)     MCV 73 (*)     MCH 21.2 (*)     MCHC 29.1 (*)     RDW 18.6 (*)     Gran % 75.0 (*)     Lymph % 17.6 (*)     All other components within normal limits   COMPREHENSIVE METABOLIC PANEL - Abnormal; Notable for the following components:    Alkaline Phosphatase 52 (*)     All other components within normal limits   LIPASE   LACTIC ACID, PLASMA   POCT URINE PREGNANCY          Imaging Results               CT Abdomen Pelvis With Contrast (Final result)  Result time 09/23/23 13:45:53       Final result by Christopher Driscoll MD (09/23/23 13:45:53)                   Impression:      1. History of prior gastric bypass with findings concerning for developing proximal small bowel obstruction, with suspected transition point at the distal most portion of the 4th segment of the duodenum at or near the midline/left paramedian small bowel anastomosis site, as further detailed above.  Surgical consultation advised.  2. Apparent circumferential wall thickening of the stomach which may be related to the distension versus nonspecific gastritis.  3. Newly developed small volume abdominopelvic ascites with mesenteric edema primarily at the mid abdomen, likely reactive.  4. Newly developed several prominent and also enlarged mesenteric lymph nodes primarily at the mid abdomen, potentially reactive from underlying infectious or inflammatory process.  Additionally, some of these enlarged nodes are near the suspected transition point of the proximal small bowel, potentially contributory.  5. Cholecystectomy with stable prominence of the intrahepatic and common bile ducts.  6. Mild colonic diverticulosis without diverticulitis.  7. Additional grossly stable findings as above.  This report was flagged in Epic as abnormal.      Electronically signed by: Christopher Driscoll MD  Date:    09/23/2023  Time:    13:45               Narrative:    EXAMINATION:  CT ABDOMEN PELVIS WITH CONTRAST    CLINICAL HISTORY:  Abdominal pain, acute, nonlocalized;    TECHNIQUE:  Low dose axial images, sagittal and coronal reformations were obtained from the lung bases to the pubic symphysis following the IV administration of 75 mL of Omnipaque 350 .  Oral contrast was not given.    COMPARISON:  CT abdomen and pelvis 07/04/2023    FINDINGS:  Imaged lung bases show minimal dependent atelectasis and minimal scattered areas of platelike scarring versus atelectasis without consolidation or pleural effusion.    Base of the heart is within normal  limits.    Partially imaged bilateral breast implants noted with suspected partially imaged implant rupture on the left.    Cholecystectomy.  Stable prominence of the intrahepatic and common bile ducts.  Liver and spleen are normal in size.  Stable subcentimeter hypoattenuating few parenchymal foci within liver which are too small to characterize.  Pancreas, spleen and bilateral adrenal glands are within normal limits.    Status post prior gastric bypass with left abdominal small bowel anastomosis again noted.  Stomach is mildly distended with fluid contents with questionable circumferential wall thickening.  The duodenum is dilated up to 3.7 cm at the 2nd portion and at the distal aspect of the 4th portion with transition point and tethering towards the midline anterior mid abdomen.  Remainder of the small bowel loops distal to this in the anastomosis site are relatively decompressed.  No abnormal wall thickening of the duodenum.  Newly developed mesenteric edema within the mid abdomen with newly developed multiple prominent and also mildly enlarged suspected mesenteric lymph nodes measuring up to 1.6 cm in maximum short dimension.    Mild amount of scattered fecal material throughout the colon.  Appendix not definitively localized; however, no pericecal inflammatory change.  Terminal ileum is decompressed.  Few scattered colonic diverticula without diverticulitis.  No pneumatosis or portal venous gas.  There is newly developed small volume ascites throughout the abdomen and pelvis, likely reactive.  No free air.    Bilateral kidneys are normal in size, shape and location with symmetric normal enhancement.  No hydronephrosis or significant perinephric stranding.  Ureters are normal in course and caliber.    Urinary bladder is moderately distended without wall thickening or adjacent inflammation.  No pelvic mass.    Extraperitoneal soft tissues are within normal limits.    Osseous structures appear stable without  acute process seen.                                       Medications   pantoprazole injection 40 mg (has no administration in time range)   QUEtiapine tablet 50 mg (has no administration in time range)   LIDOcaine (PF) 10 mg/ml (1%) injection 10 mg (has no administration in time range)   sodium chloride 0.9% flush 10 mL (has no administration in time range)   dextrose 5 % and 0.45 % NaCl with KCl 20 mEq infusion (has no administration in time range)   enoxaparin injection 40 mg (has no administration in time range)   ondansetron injection 4 mg (has no administration in time range)   oxyCODONE immediate release tablet 5 mg (has no administration in time range)   oxyCODONE immediate release tablet 10 mg (has no administration in time range)   acetaminophen tablet 1,000 mg (has no administration in time range)   gabapentin capsule 300 mg (has no administration in time range)   ondansetron injection 4 mg (4 mg Intravenous Given 9/23/23 0817)   sodium chloride 0.9% bolus 1,000 mL 1,000 mL (0 mLs Intravenous Stopped 9/23/23 0916)   morphine injection 4 mg (4 mg Intravenous Given 9/23/23 0817)   diphenhydrAMINE injection 12.5 mg (12.5 mg Intravenous Given 9/23/23 0817)   morphine injection 4 mg (4 mg Intravenous Given 9/23/23 0945)   dicyclomine injection 20 mg (20 mg Intramuscular Given 9/23/23 1145)   morphine injection 6 mg (6 mg Intravenous Given 9/23/23 1217)   diphenhydrAMINE injection 12.5 mg (12.5 mg Intravenous Given 9/23/23 1216)   iohexoL (OMNIPAQUE 350) injection 75 mL (75 mLs Intravenous Given 9/23/23 1242)     Medical Decision Making  This is a 43-year-old female history of gastric bypass, intussusception presenting to emergency department with intractable abdominal pain associated with nausea and vomiting.  Patient was evaluated at  yesterday with negative CT scan.  On initial presentation she was in discomfort.  Patient was provided with multiple rounds of pain management, to no improvement of overall  presentation I decided to reimage patient.  My initial concerns possible of pancreatitis, delayed gastric emptying, bowel obstruction.  Also considered mesenteric ischemia however patient's lactic is normal therefore I have a lower suspicion.  Repeat Imaging revealed new small bowel obstruction with transition point.  I placed a consult to General surgery and spoke with them about patient presentation.  It was determine at this time patient will be admitted to their service for continued observation and management of symptoms.  Pt discussed with supervising physician      Amount and/or Complexity of Data Reviewed  Labs: ordered. Decision-making details documented in ED Course.  Radiology: ordered.    Risk  Prescription drug management.               ED Course as of 09/23/23 1532   Sat Sep 23, 2023   0912 Lactate, Mc: 0.6 [CR]      ED Course User Index  [CR] Adelaide Alexander PA-C                    Clinical Impression:   Final diagnoses:  [K56.600] Partial small bowel obstruction        ED Disposition Condition    Observation                      Adelaide Alexander PA-C  09/23/23 1532

## 2023-09-23 NOTE — SUBJECTIVE & OBJECTIVE
No current facility-administered medications on file prior to encounter.     Current Outpatient Medications on File Prior to Encounter   Medication Sig    amitriptyline (ELAVIL) 25 MG tablet Take 1 tablet (25 mg total) by mouth nightly as needed for Insomnia.    ergocalciferol (ERGOCALCIFEROL) 50,000 unit Cap Take 1 capsule (50,000 Units total) by mouth twice a week.    ergocalciferol (VITAMIN D2) 50,000 unit Cap Take 1 capsule (50,000 Units total) by mouth twice a week.    esomeprazole (NEXIUM PACKET) 40 mg GrPS Take 40 mg by mouth before breakfast. Open capsule and dissolve pellets in water    pediatric multivit-iron-min Chew Take 1 tablet by mouth 2 (two) times daily.    polyethylene glycol (GLYCOLAX) 17 gram PwPk Take 17 g by mouth once daily.    promethazine (PHENERGAN) 25 MG tablet Take 0.5 tablets (12.5 mg total) by mouth every 6 (six) hours as needed for Nausea.    QUEtiapine (SEROQUEL) 25 MG Tab Take 50 mg by mouth nightly as needed.       Review of patient's allergies indicates:   Allergen Reactions    Dilaudid [hydromorphone] Itching    Meperidine Nausea And Vomiting and Itching     Other reaction(s): Other (See Comments)       History reviewed. No pertinent past medical history.  Past Surgical History:   Procedure Laterality Date    ABDOMINAL SURGERY      BREAST SURGERY      lift    CHOLECYSTECTOMY      DIAGNOSTIC LAPAROSCOPY N/A 5/18/2023    Procedure: LAPAROSCOPY, DIAGNOSTIC;  Surgeon: Davy Barnes MD;  Location: [address];  Service: General;  Laterality: N/A;  REPAIR INTERNAL HERNIA    DIAGNOSTIC LAPAROSCOPY N/A 7/5/2023    Procedure: LAPAROSCOPY, DIAGNOSTIC with revision of JJ anastomosis;  Surgeon: Davy Barnes MD;  Location: [address];  Service: General;  Laterality: N/A;    EYE SURGERY Right     cataract    GASTRIC BYPASS      HERNIA REPAIR N/A 2016    internal    lap band removal  7/9/14    LAPAROSCOPIC GASTRIC BANDING      LAPAROSCOPIC GASTRIC BANDING  2008     revision     Family History       Problem Relation (Age of Onset)    Hypertension Mother, Father    Obesity Father          Tobacco Use    Smoking status: Never    Smokeless tobacco: Never   Substance and Sexual Activity    Alcohol use: Yes     Comment: ocassional; wine    Drug use: No    Sexual activity: Yes     Partners: Male     Review of Systems   Constitutional:  Positive for appetite change. Negative for chills, fever and unexpected weight change.   HENT:  Negative for facial swelling and trouble swallowing.    Eyes:  Negative for visual disturbance.   Respiratory:  Negative for chest tightness and shortness of breath.    Cardiovascular:  Negative for chest pain and palpitations.   Gastrointestinal:  Positive for abdominal pain. Negative for abdominal distention, constipation, diarrhea, nausea and vomiting.   Skin:  Negative for wound.   All other systems reviewed and are negative.    Objective:     Vital Signs (Most Recent):  Temp: 98.2 °F (36.8 °C) (09/23/23 1250)  Pulse: 82 (09/23/23 1250)  Resp: 18 (09/23/23 1250)  BP: 118/65 (09/23/23 1250)  SpO2: 96 % (09/23/23 1250) Vital Signs (24h Range):  Temp:  [98 °F (36.7 °C)-98.2 °F (36.8 °C)] 98.2 °F (36.8 °C)  Pulse:  [] 82  Resp:  [18-20] 18  SpO2:  [96 %-100 %] 96 %  BP: (100-139)/(55-67) 118/65     Weight: 67.1 kg (148 lb)  Body mass index is 27.07 kg/m².     Physical Exam  Vitals reviewed.   Constitutional:       Appearance: She is not toxic-appearing.      Comments: Appears uncomfortable   HENT:      Head: Normocephalic.   Eyes:      General: No scleral icterus.     Extraocular Movements: Extraocular movements intact.   Cardiovascular:      Rate and Rhythm: Normal rate and regular rhythm.   Pulmonary:      Effort: Pulmonary effort is normal. No respiratory distress.   Abdominal:      Comments: The abdomen is mildly distended and very soft. There is moderate tenderness in the mid abdomen without localized peritonitis. She is nontender elsewhere. There  are well healed abdominal scars including an upper midline ex lap scar. No hernias appreciated   Musculoskeletal:      Cervical back: Normal range of motion and neck supple.      Right lower leg: No edema.      Left lower leg: No edema.   Skin:     General: Skin is warm and dry.   Neurological:      General: No focal deficit present.      Mental Status: She is alert and oriented to person, place, and time.            I have reviewed all pertinent lab results within the past 24 hours.  CBC:   Recent Labs   Lab 09/23/23  0808   WBC 6.92   RBC 4.76   HGB 10.1*   HCT 34.7*      MCV 73*   MCH 21.2*   MCHC 29.1*     CMP:   Recent Labs   Lab 09/23/23  0808   GLU 80   CALCIUM 9.7   ALBUMIN 4.4   PROT 7.7      K 3.8   CO2 25      BUN 11   CREATININE 0.7   ALKPHOS 52*   ALT 17   AST 18   BILITOT 0.7       Significant Diagnostics:  I have reviewed all pertinent imaging results/findings within the past 24 hours.

## 2023-09-23 NOTE — CONSULTS
[facility] - Emergency Dept  General Surgery  Consult Note    Patient Name: Sarah Padron  MRN: [id number]  Code Status: Full Code  Admission Date: 9/23/2023  Hospital Length of Stay: 0 days  Attending Physician: Adal Pineda Jr.*  Primary Care Provider: Margarito Neil MD    Patient information was obtained from patient and past medical records.     Inpatient consult to General Surgery  Consult performed by: Clay Becker MD  Consult ordered by: Clay Becker MD  Reason for consult: Abdominal Pain        Subjective:     Principal Problem: Abdominal Pain    History of Present Illness: Sarah Padron is our 43 y.o. female with PMH obesity s/p lap band in 2008, lap band removal with conversion to bypass in 2014, ex lap with reduction of internal hernia in 2016, diagnostic lap with reduction of internal hernia 5/2023, and diagnostic lap with JJ anastomosis revision for intussusception in 7/2023 presenting with acute onset mid abdominal pain since 0900 yesterday. She reports that the pain is constant and sharp. She has had rare, mild nausea when belching and denies episodes of emesis. She has not eaten since that morning, but she is keeping fluids down. She presented to the [facility] ED yesterday and reportedly had an unremarkable CT scan. Given her continued pain today, she presented to our ED. Workup here demonstrating no leukocytosis but a mild left shift, normal electrolytes, normal Cr, normal lipase, and moderate small bowel dilation from the level of her JJ anastomosis and proximal.       No current facility-administered medications on file prior to encounter.     Current Outpatient Medications on File Prior to Encounter   Medication Sig    amitriptyline (ELAVIL) 25 MG tablet Take 1 tablet (25 mg total) by mouth nightly as needed for Insomnia.    ergocalciferol (ERGOCALCIFEROL) 50,000 unit Cap Take 1 capsule (50,000 Units total) by mouth twice a week.    ergocalciferol  (VITAMIN D2) 50,000 unit Cap Take 1 capsule (50,000 Units total) by mouth twice a week.    esomeprazole (NEXIUM PACKET) 40 mg GrPS Take 40 mg by mouth before breakfast. Open capsule and dissolve pellets in water    pediatric multivit-iron-min Chew Take 1 tablet by mouth 2 (two) times daily.    polyethylene glycol (GLYCOLAX) 17 gram PwPk Take 17 g by mouth once daily.    promethazine (PHENERGAN) 25 MG tablet Take 0.5 tablets (12.5 mg total) by mouth every 6 (six) hours as needed for Nausea.    QUEtiapine (SEROQUEL) 25 MG Tab Take 50 mg by mouth nightly as needed.       Review of patient's allergies indicates:   Allergen Reactions    Dilaudid [hydromorphone] Itching    Meperidine Nausea And Vomiting and Itching     Other reaction(s): Other (See Comments)       History reviewed. No pertinent past medical history.  Past Surgical History:   Procedure Laterality Date    ABDOMINAL SURGERY      BREAST SURGERY      lift    CHOLECYSTECTOMY      DIAGNOSTIC LAPAROSCOPY N/A 5/18/2023    Procedure: LAPAROSCOPY, DIAGNOSTIC;  Surgeon: Davy Barnes MD;  Location: [address];  Service: General;  Laterality: N/A;  REPAIR INTERNAL HERNIA    DIAGNOSTIC LAPAROSCOPY N/A 7/5/2023    Procedure: LAPAROSCOPY, DIAGNOSTIC with revision of JJ anastomosis;  Surgeon: Davy Barnes MD;  Location: [address];  Service: General;  Laterality: N/A;    EYE SURGERY Right     cataract    GASTRIC BYPASS      HERNIA REPAIR N/A 2016    internal    lap band removal  7/9/14    LAPAROSCOPIC GASTRIC BANDING      LAPAROSCOPIC GASTRIC BANDING  2008    revision     Family History       Problem Relation (Age of Onset)    Hypertension Mother, Father    Obesity Father          Tobacco Use    Smoking status: Never    Smokeless tobacco: Never   Substance and Sexual Activity    Alcohol use: Yes     Comment: ocassional; wine    Drug use: No    Sexual activity: Yes     Partners: Male     Review of Systems    Constitutional:  Positive for appetite change. Negative for chills, fever and unexpected weight change.   HENT:  Negative for facial swelling and trouble swallowing.    Eyes:  Negative for visual disturbance.   Respiratory:  Negative for chest tightness and shortness of breath.    Cardiovascular:  Negative for chest pain and palpitations.   Gastrointestinal:  Positive for abdominal pain. Negative for abdominal distention, constipation, diarrhea, nausea and vomiting.   Skin:  Negative for wound.   All other systems reviewed and are negative.    Objective:     Vital Signs (Most Recent):  Temp: 98.2 °F (36.8 °C) (09/23/23 1250)  Pulse: 82 (09/23/23 1250)  Resp: 18 (09/23/23 1250)  BP: 118/65 (09/23/23 1250)  SpO2: 96 % (09/23/23 1250) Vital Signs (24h Range):  Temp:  [98 °F (36.7 °C)-98.2 °F (36.8 °C)] 98.2 °F (36.8 °C)  Pulse:  [] 82  Resp:  [18-20] 18  SpO2:  [96 %-100 %] 96 %  BP: (100-139)/(55-67) 118/65     Weight: 67.1 kg (148 lb)  Body mass index is 27.07 kg/m².     Physical Exam  Vitals reviewed.   Constitutional:       Appearance: She is not toxic-appearing.      Comments: Appears uncomfortable   HENT:      Head: Normocephalic.   Eyes:      General: No scleral icterus.     Extraocular Movements: Extraocular movements intact.   Cardiovascular:      Rate and Rhythm: Normal rate and regular rhythm.   Pulmonary:      Effort: Pulmonary effort is normal. No respiratory distress.   Abdominal:      Comments: The abdomen is mildly distended and very soft. There is moderate tenderness in the mid abdomen without localized peritonitis. She is nontender elsewhere. There are well healed abdominal scars including an upper midline ex lap scar. No hernias appreciated   Musculoskeletal:      Cervical back: Normal range of motion and neck supple.      Right lower leg: No edema.      Left lower leg: No edema.   Skin:     General: Skin is warm and dry.   Neurological:      General: No focal deficit present.      Mental  Status: She is alert and oriented to person, place, and time.            I have reviewed all pertinent lab results within the past 24 hours.  CBC:   Recent Labs   Lab 09/23/23  0808   WBC 6.92   RBC 4.76   HGB 10.1*   HCT 34.7*      MCV 73*   MCH 21.2*   MCHC 29.1*     CMP:   Recent Labs   Lab 09/23/23  0808   GLU 80   CALCIUM 9.7   ALBUMIN 4.4   PROT 7.7      K 3.8   CO2 25      BUN 11   CREATININE 0.7   ALKPHOS 52*   ALT 17   AST 18   BILITOT 0.7       Significant Diagnostics:  I have reviewed all pertinent imaging results/findings within the past 24 hours.      Assessment/Plan:     History of Yogesh-en-Y gastric bypass  42 yo F with an extensive bariatric surgery history presenting with 36 hours of constant mid-abdominal pain and moderately dilated small bowel on imaging. Differential includes partial small bowel obstruction secondary to anastomotic stricturing, small bowel obstruction secondary to adhesive disease, or gastroenteritis. She is hemodynamically stable without significant lab abnormalities and she is not nauseated. Will plan to admit to obs with PO challenge and serial abdominal exams.    -Admit to obs under the bariatric surgery service  -Will start bariatric clears for PO challenge. Follow up tolerance   -Serial abdominal exams  -PRN PO analgesia and antiemetics  -Daily labs  -Home meds  -DVT ppx      Case discussed with Dr. Pineda, staff on call    VTE Risk Mitigation (From admission, onward)         Ordered     enoxaparin injection 40 mg  Daily         09/23/23 1456     Place sequential compression device  Until discontinued         09/23/23 1456     IP VTE LOW RISK PATIENT  Once         09/23/23 1456                Thank you for your consult. I will follow-up with patient. Please contact us if you have any additional questions.    Clay Becker MD  General Surgery  [facility] - Emergency Dept

## 2023-09-24 ENCOUNTER — ANESTHESIA EVENT (OUTPATIENT)
Dept: SURGERY | Facility: HOSPITAL | Age: 43
DRG: 330 | End: 2023-09-24
Payer: COMMERCIAL

## 2023-09-24 ENCOUNTER — ANESTHESIA (OUTPATIENT)
Dept: SURGERY | Facility: HOSPITAL | Age: 43
DRG: 330 | End: 2023-09-24
Payer: COMMERCIAL

## 2023-09-24 LAB
ALBUMIN SERPL BCP-MCNC: 3.7 G/DL (ref 3.5–5.2)
ALP SERPL-CCNC: 49 U/L (ref 55–135)
ALT SERPL W/O P-5'-P-CCNC: 14 U/L (ref 10–44)
ANION GAP SERPL CALC-SCNC: 6 MMOL/L (ref 8–16)
AST SERPL-CCNC: 16 U/L (ref 10–40)
BASOPHILS # BLD AUTO: 0.06 K/UL (ref 0–0.2)
BASOPHILS NFR BLD: 0.8 % (ref 0–1.9)
BILIRUB SERPL-MCNC: 0.6 MG/DL (ref 0.1–1)
BUN SERPL-MCNC: 7 MG/DL (ref 6–20)
CALCIUM SERPL-MCNC: 8.9 MG/DL (ref 8.7–10.5)
CHLORIDE SERPL-SCNC: 106 MMOL/L (ref 95–110)
CO2 SERPL-SCNC: 23 MMOL/L (ref 23–29)
CREAT SERPL-MCNC: 0.7 MG/DL (ref 0.5–1.4)
DIFFERENTIAL METHOD: ABNORMAL
EOSINOPHIL # BLD AUTO: 0 K/UL (ref 0–0.5)
EOSINOPHIL NFR BLD: 0.3 % (ref 0–8)
ERYTHROCYTE [DISTWIDTH] IN BLOOD BY AUTOMATED COUNT: 17.8 % (ref 11.5–14.5)
EST. GFR  (NO RACE VARIABLE): >60 ML/MIN/1.73 M^2
GLUCOSE SERPL-MCNC: 113 MG/DL (ref 70–110)
HCT VFR BLD AUTO: 33.1 % (ref 37–48.5)
HGB BLD-MCNC: 9.3 G/DL (ref 12–16)
IMM GRANULOCYTES # BLD AUTO: 0.02 K/UL (ref 0–0.04)
IMM GRANULOCYTES NFR BLD AUTO: 0.3 % (ref 0–0.5)
LYMPHOCYTES # BLD AUTO: 0.9 K/UL (ref 1–4.8)
LYMPHOCYTES NFR BLD: 11.9 % (ref 18–48)
MAGNESIUM SERPL-MCNC: 1.7 MG/DL (ref 1.6–2.6)
MCH RBC QN AUTO: 20.9 PG (ref 27–31)
MCHC RBC AUTO-ENTMCNC: 28.1 G/DL (ref 32–36)
MCV RBC AUTO: 75 FL (ref 82–98)
MONOCYTES # BLD AUTO: 0.7 K/UL (ref 0.3–1)
MONOCYTES NFR BLD: 9.3 % (ref 4–15)
NEUTROPHILS # BLD AUTO: 6.1 K/UL (ref 1.8–7.7)
NEUTROPHILS NFR BLD: 77.4 % (ref 38–73)
NRBC BLD-RTO: 0 /100 WBC
PHOSPHATE SERPL-MCNC: 3.7 MG/DL (ref 2.7–4.5)
PLATELET # BLD AUTO: 323 K/UL (ref 150–450)
PMV BLD AUTO: 10.2 FL (ref 9.2–12.9)
POTASSIUM SERPL-SCNC: 4.7 MMOL/L (ref 3.5–5.1)
PROT SERPL-MCNC: 6.5 G/DL (ref 6–8.4)
RBC # BLD AUTO: 4.44 M/UL (ref 4–5.4)
SODIUM SERPL-SCNC: 135 MMOL/L (ref 136–145)
WBC # BLD AUTO: 7.82 K/UL (ref 3.9–12.7)

## 2023-09-24 PROCEDURE — 25000003 PHARM REV CODE 250

## 2023-09-24 PROCEDURE — 63600175 PHARM REV CODE 636 W HCPCS

## 2023-09-24 PROCEDURE — 27201423 OPTIME MED/SURG SUP & DEVICES STERILE SUPPLY: Performed by: SURGERY

## 2023-09-24 PROCEDURE — 25000003 PHARM REV CODE 250: Performed by: STUDENT IN AN ORGANIZED HEALTH CARE EDUCATION/TRAINING PROGRAM

## 2023-09-24 PROCEDURE — D9220A PRA ANESTHESIA: ICD-10-PCS | Mod: ANES,,, | Performed by: SURGERY

## 2023-09-24 PROCEDURE — 25000003 PHARM REV CODE 250: Performed by: NURSE ANESTHETIST, CERTIFIED REGISTERED

## 2023-09-24 PROCEDURE — 44238 PR LAPAROSCOPY; REDUCT/REPAIR  INTERNAL HERNIA/VOLVULUS/DEFECT: ICD-10-PCS | Mod: ,,, | Performed by: SURGERY

## 2023-09-24 PROCEDURE — 85025 COMPLETE CBC W/AUTO DIFF WBC: CPT | Performed by: STUDENT IN AN ORGANIZED HEALTH CARE EDUCATION/TRAINING PROGRAM

## 2023-09-24 PROCEDURE — 36000709 HC OR TIME LEV III EA ADD 15 MIN: Performed by: SURGERY

## 2023-09-24 PROCEDURE — D9220A PRA ANESTHESIA: Mod: CRNA,,, | Performed by: NURSE ANESTHETIST, CERTIFIED REGISTERED

## 2023-09-24 PROCEDURE — 96367 TX/PROPH/DG ADDL SEQ IV INF: CPT

## 2023-09-24 PROCEDURE — G0378 HOSPITAL OBSERVATION PER HR: HCPCS

## 2023-09-24 PROCEDURE — 94761 N-INVAS EAR/PLS OXIMETRY MLT: CPT

## 2023-09-24 PROCEDURE — 96372 THER/PROPH/DIAG INJ SC/IM: CPT

## 2023-09-24 PROCEDURE — D9220A PRA ANESTHESIA: ICD-10-PCS | Mod: CRNA,,, | Performed by: NURSE ANESTHETIST, CERTIFIED REGISTERED

## 2023-09-24 PROCEDURE — 96361 HYDRATE IV INFUSION ADD-ON: CPT

## 2023-09-24 PROCEDURE — 96375 TX/PRO/DX INJ NEW DRUG ADDON: CPT

## 2023-09-24 PROCEDURE — 63600175 PHARM REV CODE 636 W HCPCS: Performed by: NURSE ANESTHETIST, CERTIFIED REGISTERED

## 2023-09-24 PROCEDURE — 99223 1ST HOSP IP/OBS HIGH 75: CPT | Mod: 25,,, | Performed by: SURGERY

## 2023-09-24 PROCEDURE — 25000003 PHARM REV CODE 250: Performed by: SURGERY

## 2023-09-24 PROCEDURE — 44238 UNLISTED LAPS PX INTESTINE: CPT | Mod: ,,, | Performed by: SURGERY

## 2023-09-24 PROCEDURE — 63600175 PHARM REV CODE 636 W HCPCS: Performed by: STUDENT IN AN ORGANIZED HEALTH CARE EDUCATION/TRAINING PROGRAM

## 2023-09-24 PROCEDURE — 63600175 PHARM REV CODE 636 W HCPCS: Performed by: SURGERY

## 2023-09-24 PROCEDURE — 37000009 HC ANESTHESIA EA ADD 15 MINS: Performed by: SURGERY

## 2023-09-24 PROCEDURE — 84100 ASSAY OF PHOSPHORUS: CPT | Performed by: STUDENT IN AN ORGANIZED HEALTH CARE EDUCATION/TRAINING PROGRAM

## 2023-09-24 PROCEDURE — 71000033 HC RECOVERY, INTIAL HOUR: Performed by: SURGERY

## 2023-09-24 PROCEDURE — 37000008 HC ANESTHESIA 1ST 15 MINUTES: Performed by: SURGERY

## 2023-09-24 PROCEDURE — 83735 ASSAY OF MAGNESIUM: CPT | Performed by: STUDENT IN AN ORGANIZED HEALTH CARE EDUCATION/TRAINING PROGRAM

## 2023-09-24 PROCEDURE — 71000039 HC RECOVERY, EACH ADD'L HOUR: Performed by: SURGERY

## 2023-09-24 PROCEDURE — 96376 TX/PRO/DX INJ SAME DRUG ADON: CPT

## 2023-09-24 PROCEDURE — 99223 PR INITIAL HOSPITAL CARE,LEVL III: ICD-10-PCS | Mod: 25,,, | Performed by: SURGERY

## 2023-09-24 PROCEDURE — 20600001 HC STEP DOWN PRIVATE ROOM

## 2023-09-24 PROCEDURE — 96366 THER/PROPH/DIAG IV INF ADDON: CPT

## 2023-09-24 PROCEDURE — 80053 COMPREHEN METABOLIC PANEL: CPT | Performed by: STUDENT IN AN ORGANIZED HEALTH CARE EDUCATION/TRAINING PROGRAM

## 2023-09-24 PROCEDURE — D9220A PRA ANESTHESIA: Mod: ANES,,, | Performed by: SURGERY

## 2023-09-24 PROCEDURE — C9113 INJ PANTOPRAZOLE SODIUM, VIA: HCPCS | Performed by: STUDENT IN AN ORGANIZED HEALTH CARE EDUCATION/TRAINING PROGRAM

## 2023-09-24 PROCEDURE — 71000015 HC POSTOP RECOV 1ST HR: Performed by: SURGERY

## 2023-09-24 PROCEDURE — 36000708 HC OR TIME LEV III 1ST 15 MIN: Performed by: SURGERY

## 2023-09-24 PROCEDURE — 36415 COLL VENOUS BLD VENIPUNCTURE: CPT | Performed by: STUDENT IN AN ORGANIZED HEALTH CARE EDUCATION/TRAINING PROGRAM

## 2023-09-24 RX ORDER — PROPOFOL 10 MG/ML
VIAL (ML) INTRAVENOUS
Status: DISCONTINUED | OUTPATIENT
Start: 2023-09-24 | End: 2023-09-24

## 2023-09-24 RX ORDER — FENTANYL CITRATE 50 UG/ML
25 INJECTION, SOLUTION INTRAMUSCULAR; INTRAVENOUS EVERY 5 MIN PRN
Status: DISCONTINUED | OUTPATIENT
Start: 2023-09-24 | End: 2023-09-25

## 2023-09-24 RX ORDER — PHENYLEPHRINE HYDROCHLORIDE 10 MG/ML
INJECTION INTRAVENOUS
Status: DISCONTINUED | OUTPATIENT
Start: 2023-09-24 | End: 2023-09-24

## 2023-09-24 RX ORDER — DEXMEDETOMIDINE HYDROCHLORIDE 100 UG/ML
INJECTION, SOLUTION INTRAVENOUS
Status: DISCONTINUED | OUTPATIENT
Start: 2023-09-24 | End: 2023-09-24

## 2023-09-24 RX ORDER — HYDROMORPHONE HYDROCHLORIDE 1 MG/ML
0.5 INJECTION, SOLUTION INTRAMUSCULAR; INTRAVENOUS; SUBCUTANEOUS EVERY 4 HOURS PRN
Status: DISCONTINUED | OUTPATIENT
Start: 2023-09-24 | End: 2023-09-25 | Stop reason: HOSPADM

## 2023-09-24 RX ORDER — FAMOTIDINE 10 MG/ML
INJECTION INTRAVENOUS
Status: DISCONTINUED | OUTPATIENT
Start: 2023-09-24 | End: 2023-09-24

## 2023-09-24 RX ORDER — CEFAZOLIN SODIUM 1 G/3ML
INJECTION, POWDER, FOR SOLUTION INTRAMUSCULAR; INTRAVENOUS
Status: DISCONTINUED | OUTPATIENT
Start: 2023-09-24 | End: 2023-09-24

## 2023-09-24 RX ORDER — ROCURONIUM BROMIDE 10 MG/ML
INJECTION, SOLUTION INTRAVENOUS
Status: DISCONTINUED | OUTPATIENT
Start: 2023-09-24 | End: 2023-09-24

## 2023-09-24 RX ORDER — SUCCINYLCHOLINE CHLORIDE 20 MG/ML
INJECTION INTRAMUSCULAR; INTRAVENOUS
Status: DISCONTINUED | OUTPATIENT
Start: 2023-09-24 | End: 2023-09-24

## 2023-09-24 RX ORDER — KETAMINE HCL IN 0.9 % NACL 50 MG/5 ML
SYRINGE (ML) INTRAVENOUS
Status: DISCONTINUED | OUTPATIENT
Start: 2023-09-24 | End: 2023-09-24

## 2023-09-24 RX ORDER — LIDOCAINE HYDROCHLORIDE 10 MG/ML
INJECTION, SOLUTION INTRAVENOUS
Status: DISCONTINUED | OUTPATIENT
Start: 2023-09-24 | End: 2023-09-24

## 2023-09-24 RX ORDER — ONDANSETRON 2 MG/ML
INJECTION INTRAMUSCULAR; INTRAVENOUS
Status: DISCONTINUED | OUTPATIENT
Start: 2023-09-24 | End: 2023-09-24

## 2023-09-24 RX ORDER — BUPIVACAINE HYDROCHLORIDE 2.5 MG/ML
INJECTION, SOLUTION EPIDURAL; INFILTRATION; INTRACAUDAL
Status: DISCONTINUED | OUTPATIENT
Start: 2023-09-24 | End: 2023-09-24 | Stop reason: HOSPADM

## 2023-09-24 RX ORDER — SODIUM CHLORIDE 0.9 % (FLUSH) 0.9 %
10 SYRINGE (ML) INJECTION
Status: DISCONTINUED | OUTPATIENT
Start: 2023-09-24 | End: 2023-09-25

## 2023-09-24 RX ORDER — KETOROLAC TROMETHAMINE 30 MG/ML
30 INJECTION, SOLUTION INTRAMUSCULAR; INTRAVENOUS EVERY 6 HOURS
Status: DISCONTINUED | OUTPATIENT
Start: 2023-09-24 | End: 2023-09-24

## 2023-09-24 RX ORDER — MIDAZOLAM HYDROCHLORIDE 1 MG/ML
INJECTION INTRAMUSCULAR; INTRAVENOUS
Status: DISCONTINUED | OUTPATIENT
Start: 2023-09-24 | End: 2023-09-24

## 2023-09-24 RX ORDER — ACETAMINOPHEN 325 MG/1
650 TABLET ORAL EVERY 6 HOURS
Status: DISCONTINUED | OUTPATIENT
Start: 2023-09-24 | End: 2023-09-25 | Stop reason: HOSPADM

## 2023-09-24 RX ORDER — FENTANYL CITRATE 50 UG/ML
INJECTION, SOLUTION INTRAMUSCULAR; INTRAVENOUS
Status: DISCONTINUED | OUTPATIENT
Start: 2023-09-24 | End: 2023-09-24

## 2023-09-24 RX ORDER — LIDOCAINE HYDROCHLORIDE AND EPINEPHRINE 10; 10 MG/ML; UG/ML
INJECTION, SOLUTION INFILTRATION; PERINEURAL
Status: DISCONTINUED | OUTPATIENT
Start: 2023-09-24 | End: 2023-09-24 | Stop reason: HOSPADM

## 2023-09-24 RX ORDER — DEXAMETHASONE SODIUM PHOSPHATE 4 MG/ML
INJECTION, SOLUTION INTRA-ARTICULAR; INTRALESIONAL; INTRAMUSCULAR; INTRAVENOUS; SOFT TISSUE
Status: DISCONTINUED | OUTPATIENT
Start: 2023-09-24 | End: 2023-09-24

## 2023-09-24 RX ORDER — HALOPERIDOL 5 MG/ML
INJECTION INTRAMUSCULAR
Status: DISCONTINUED | OUTPATIENT
Start: 2023-09-24 | End: 2023-09-24

## 2023-09-24 RX ORDER — ONDANSETRON 2 MG/ML
4 INJECTION INTRAMUSCULAR; INTRAVENOUS DAILY PRN
Status: DISCONTINUED | OUTPATIENT
Start: 2023-09-24 | End: 2023-09-25 | Stop reason: HOSPADM

## 2023-09-24 RX ADMIN — HYDROMORPHONE HYDROCHLORIDE 0.5 MG: 1 INJECTION, SOLUTION INTRAMUSCULAR; INTRAVENOUS; SUBCUTANEOUS at 01:09

## 2023-09-24 RX ADMIN — ACETAMINOPHEN 1000 MG: 10 INJECTION, SOLUTION INTRAVENOUS at 05:09

## 2023-09-24 RX ADMIN — GABAPENTIN 300 MG: 300 CAPSULE ORAL at 08:09

## 2023-09-24 RX ADMIN — ENOXAPARIN SODIUM 40 MG: 40 INJECTION SUBCUTANEOUS at 05:09

## 2023-09-24 RX ADMIN — Medication 30 MG: at 01:09

## 2023-09-24 RX ADMIN — ONDANSETRON 4 MG: 2 INJECTION INTRAMUSCULAR; INTRAVENOUS at 01:09

## 2023-09-24 RX ADMIN — METHOCARBAMOL 500 MG: 100 INJECTION, SOLUTION INTRAMUSCULAR; INTRAVENOUS at 05:09

## 2023-09-24 RX ADMIN — SODIUM CHLORIDE, SODIUM GLUCONATE, SODIUM ACETATE, POTASSIUM CHLORIDE, MAGNESIUM CHLORIDE, SODIUM PHOSPHATE, DIBASIC, AND POTASSIUM PHOSPHATE: .53; .5; .37; .037; .03; .012; .00082 INJECTION, SOLUTION INTRAVENOUS at 02:09

## 2023-09-24 RX ADMIN — PANTOPRAZOLE SODIUM 40 MG: 40 INJECTION, POWDER, FOR SOLUTION INTRAVENOUS at 08:09

## 2023-09-24 RX ADMIN — KETOROLAC TROMETHAMINE 30 MG: 30 INJECTION, SOLUTION INTRAMUSCULAR; INTRAVENOUS at 03:09

## 2023-09-24 RX ADMIN — SUCCINYLCHOLINE CHLORIDE 140 MG: 20 INJECTION, SOLUTION INTRAMUSCULAR; INTRAVENOUS at 01:09

## 2023-09-24 RX ADMIN — HALOPERIDOL LACTATE 1 MG: 5 INJECTION, SOLUTION INTRAMUSCULAR at 01:09

## 2023-09-24 RX ADMIN — DEXTROSE, SODIUM CHLORIDE, AND POTASSIUM CHLORIDE: 5; .45; .15 INJECTION INTRAVENOUS at 07:09

## 2023-09-24 RX ADMIN — DEXAMETHASONE SODIUM PHOSPHATE 8 MG: 4 INJECTION, SOLUTION INTRAMUSCULAR; INTRAVENOUS at 01:09

## 2023-09-24 RX ADMIN — HYDROMORPHONE HYDROCHLORIDE 0.5 MG: 1 INJECTION, SOLUTION INTRAMUSCULAR; INTRAVENOUS; SUBCUTANEOUS at 09:09

## 2023-09-24 RX ADMIN — LIDOCAINE HYDROCHLORIDE 100 MG: 10 INJECTION, SOLUTION INTRAVENOUS at 01:09

## 2023-09-24 RX ADMIN — DEXMEDETOMIDINE 12 MCG: 100 INJECTION, SOLUTION, CONCENTRATE INTRAVENOUS at 01:09

## 2023-09-24 RX ADMIN — ACETAMINOPHEN 650 MG: 325 TABLET ORAL at 07:09

## 2023-09-24 RX ADMIN — ROCURONIUM BROMIDE 40 MG: 10 INJECTION, SOLUTION INTRAVENOUS at 01:09

## 2023-09-24 RX ADMIN — METHOCARBAMOL 1000 MG: 100 INJECTION, SOLUTION INTRAMUSCULAR; INTRAVENOUS at 11:09

## 2023-09-24 RX ADMIN — DEXTROSE, SODIUM CHLORIDE, AND POTASSIUM CHLORIDE 75 ML/HR: 5; .45; .15 INJECTION INTRAVENOUS at 03:09

## 2023-09-24 RX ADMIN — METHOCARBAMOL 1000 MG: 100 INJECTION, SOLUTION INTRAMUSCULAR; INTRAVENOUS at 04:09

## 2023-09-24 RX ADMIN — SODIUM CHLORIDE, SODIUM GLUCONATE, SODIUM ACETATE, POTASSIUM CHLORIDE, MAGNESIUM CHLORIDE, SODIUM PHOSPHATE, DIBASIC, AND POTASSIUM PHOSPHATE: .53; .5; .37; .037; .03; .012; .00082 INJECTION, SOLUTION INTRAVENOUS at 01:09

## 2023-09-24 RX ADMIN — OXYCODONE HYDROCHLORIDE 10 MG: 10 TABLET ORAL at 10:09

## 2023-09-24 RX ADMIN — FENTANYL CITRATE 100 MCG: 50 INJECTION, SOLUTION INTRAMUSCULAR; INTRAVENOUS at 01:09

## 2023-09-24 RX ADMIN — OXYCODONE HYDROCHLORIDE 10 MG: 10 TABLET ORAL at 07:09

## 2023-09-24 RX ADMIN — SUGAMMADEX 200 MG: 100 INJECTION, SOLUTION INTRAVENOUS at 02:09

## 2023-09-24 RX ADMIN — FAMOTIDINE 20 MG: 10 INJECTION, SOLUTION INTRAVENOUS at 01:09

## 2023-09-24 RX ADMIN — CEFAZOLIN 2 G: 330 INJECTION, POWDER, FOR SOLUTION INTRAMUSCULAR; INTRAVENOUS at 01:09

## 2023-09-24 RX ADMIN — PHENYLEPHRINE HYDROCHLORIDE 100 MCG: 10 INJECTION INTRAVENOUS at 01:09

## 2023-09-24 RX ADMIN — PROPOFOL 200 MG: 10 INJECTION, EMULSION INTRAVENOUS at 01:09

## 2023-09-24 RX ADMIN — ONDANSETRON 4 MG: 2 INJECTION INTRAMUSCULAR; INTRAVENOUS at 10:09

## 2023-09-24 RX ADMIN — ROCURONIUM BROMIDE 10 MG: 10 INJECTION, SOLUTION INTRAVENOUS at 01:09

## 2023-09-24 RX ADMIN — GABAPENTIN 300 MG: 300 CAPSULE ORAL at 04:09

## 2023-09-24 RX ADMIN — MIDAZOLAM HYDROCHLORIDE 2 MG: 1 INJECTION INTRAMUSCULAR; INTRAVENOUS at 01:09

## 2023-09-24 RX ADMIN — SODIUM CHLORIDE: 0.9 INJECTION, SOLUTION INTRAVENOUS at 01:09

## 2023-09-24 RX ADMIN — PROMETHAZINE HYDROCHLORIDE 12.5 MG: 25 INJECTION INTRAMUSCULAR; INTRAVENOUS at 03:09

## 2023-09-24 NOTE — OP NOTE
DATE OF PROCEDURE: 09/24/2023   SERVICE: General Surgery.   Surgeon(s) and Role:     * Adal Pineda Jr., MD - Primary  PREOPERATIVE DIAGNOSES: Abdominal Pain  POSTOPERATIVE DIAGNOSES:Recurrent Internal Hernia  PROCEDURE: Laparoscopic Reduction and Repair of Internal Hernia  ANESTHESIA: General endotracheal and local.   DESCRIPTION OF PROCEDURE:  The the patient was taken to the operating room placed under general anesthesia and prepped and draped in sterile fashion.  At this time an incision was made approximately 15 cm from xiphoid and 5 cm left of midline after infiltrating with local anesthetic.  Using an Optiview trocar intra-abdominal access was obtained under direct visualization without difficulty.  At this time pneumoperitoneum was obtained.  We noted some initial small bowel dilation and free fluid present in the a abdominal cavity and proceeded with further trocar placement.  A 5 mm trocar was placed in the left anterior axillary subcostal position, a 12 mm port was placed approximately 15 cm from xiphoid just to the right of midline, and a 2nd 5 mm port was placed in the midclavicular line in the subcostal position.  At this time we proceeded suction of the fluid in the abdominal cavity.  We then proceeded with the evaluation of the Yogesh limb.  The gastrojejunostomy appeared to be in good condition and no abnormalities were noted we began to run the Yogesh limb and immediately noted a Galeana defect posterior to the Yogesh limb.  At this time we proceeded to reduce this internal hernia and noted that almost all the small bowel had made its way through this defect.  We carefully reduced all the small bowel through the defect and noted that it appeared that previously this had attempted to be closed as well.  This was noted by a permanent suture present on the posterior Yogesh limb but was not attached to the transverse mesocolon.  At this point we proceeded with closure of this defect again.  This was  done with a 2-0 Surgidac on the Endo Stitch device running the mesentery of the Yogesh limb to the transverse colonic mesocolon and completely closing the Galeana defect.  We did inspect the JJ as well and noted no internal hernia present.  We also ran the biliopancreatic limb to the ligament Treitz and noted no abnormalities here.  At this time we proceeded with closure.  The ports removed under direct visualization and air was evacuated.  The skin of all 4 port sites were closed with 4 Monocryl suture in subcuticular fashion.  Mastisol and Steri-Strips were placed and the patient was allowed to wake from general anesthesia and transferred to bed for transport to recovery.  COMPLICATIONS: None.   SPONGE COUNT: Correct.   BLOOD LOSS:10 mL.   FLUIDS: Per Anesthesia.   BLOOD GIVEN: None.   DRAINS: None.   SPECIMENS: None  CONDITION OF THE PATIENT: Good.   I was present for the entire procedure.

## 2023-09-24 NOTE — TRANSFER OF CARE
"Anesthesia Transfer of Care Note    Patient: Sarah Padron    Procedure(s) Performed: Procedure(s) (LRB):  LAPAROSCOPY, DIAGNOSTIC (N/A)  REPAIR, HERNIA, INCISIONAL, LAPAROSCOPIC    Patient location: PACU    Anesthesia Type: general    Transport from OR: Transported from OR on 6-10 L/min O2 by face mask with adequate spontaneous ventilation    Post pain: adequate analgesia    Post assessment: no apparent anesthetic complications    Post vital signs: stable    Level of consciousness: awake    Nausea/Vomiting: no nausea/vomiting    Complications: none    Transfer of care protocol was followed      Last vitals:   Visit Vitals  BP (!) 106/57 (BP Location: Right arm, Patient Position: Lying)   Pulse 80   Temp 36.2 °C (97.1 °F) (Oral)   Resp 20   Ht 5' 3" (1.6 m)   Wt 69.6 kg (153 lb 7 oz)   SpO2 100%   Breastfeeding No   BMI 27.18 kg/m²     "

## 2023-09-24 NOTE — PROGRESS NOTES
Patient refuses midnight vital signs. Patient states that she did not want to be disturbed, if asleep, for vital signs.

## 2023-09-24 NOTE — ANESTHESIA PREPROCEDURE EVALUATION
[facility]  Anesthesia Pre-Operative Evaluation         Patient Name: Sarah Padron  YOB: 1980  MRN: [id number]    SUBJECTIVE:     Pre-operative evaluation for Procedure(s) (LRB):  LAPAROSCOPY, DIAGNOSTIC (N/A)     09/24/2023    Sarah Padron is a 43 y.o. female w/ a significant PMHx of obesity s/p lap band in 2008 with extensive history of presenting for complications--internal hernia, jj anastamosis revision, intussusception --with concerns for SBO.    NPO: appropriately NPO  Access:       Peripheral IV - Single Lumen 09/23/23 0808 20 G Right Antecubital (Active)     HD: Stable on room air    Prev airway:   G1V, easy mask      Drips:    dextrose 5 % and 0.45 % NaCl with KCl 20 mEq 75 mL/hr at 09/24/23 0735       Patient Active Problem List   Diagnosis    Vitamin D deficiency    History of Yogesh-en-Y gastric bypass    Cervical radiculopathy    Facet arthropathy, cervical    DDD (degenerative disc disease)    Spondylosis without myelopathy    Cervical radicular pain    Pain in limb    Internal hernia    Supervision of normal pregnancy in third trimester    BMI 27.0-27.9,adult       Review of patient's allergies indicates:   Allergen Reactions    Dilaudid [hydromorphone] Itching    Meperidine Nausea And Vomiting and Itching     Other reaction(s): Other (See Comments)       Current Inpatient Medications:    acetaminophen  1,000 mg Intravenous Q8H    acetaminophen  650 mg Oral Q6H    enoxparin  40 mg Subcutaneous Daily    gabapentin  300 mg Oral TID    methocarbamol (ROBAXIN) IVPB  1,000 mg Intravenous Q8H    pantoprazole  40 mg Intravenous Daily       Current Outpatient Medications   Medication Instructions    amitriptyline (ELAVIL) 25 mg, Oral, Nightly PRN    ergocalciferol (ERGOCALCIFEROL) 50,000 Units, Oral, Twice weekly    ergocalciferol (VITAMIN D2) 50,000 Units, Oral, Twice weekly    esomeprazole (NEXIUM PACKET) 40 mg, Oral, Before  breakfast, Open capsule and dissolve pellets in water    pediatric multivit-iron-min Chew 1 tablet, Oral, 2 times daily    polyethylene glycol (GLYCOLAX) 17 g, Oral, Daily    promethazine (PHENERGAN) 12.5 mg, Oral, Every 6 hours PRN    QUEtiapine (SEROQUEL) 50 mg, Oral, Nightly PRN       Surgical History  Past Surgical History:   Procedure Laterality Date    ABDOMINAL SURGERY      BREAST SURGERY      lift    CHOLECYSTECTOMY      DIAGNOSTIC LAPAROSCOPY N/A 5/18/2023    Procedure: LAPAROSCOPY, DIAGNOSTIC;  Surgeon: Davy Barnes MD;  Location: [facility] OR [address] [place] [address];  Service: General;  Laterality: N/A;  REPAIR INTERNAL HERNIA    DIAGNOSTIC LAPAROSCOPY N/A 7/5/2023    Procedure: LAPAROSCOPY, DIAGNOSTIC with revision of JJ anastomosis;  Surgeon: Davy Barnes MD;  Location: [facility] OR [address] [place] [address];  Service: General;  Laterality: N/A;    EYE SURGERY Right     cataract    GASTRIC BYPASS      HERNIA REPAIR N/A 2016    internal    lap band removal  7/9/14    LAPAROSCOPIC GASTRIC BANDING      LAPAROSCOPIC GASTRIC BANDING  2008    revision       Social History:  Tobacco Use: Low Risk  (9/23/2023)    Patient History     Smoking Tobacco Use: Never     Smokeless Tobacco Use: Never     Passive Exposure: Not on file     Alcohol Use: Not on file         OBJECTIVE:     Vital Signs Range (Last 24H):  Temp:  [36.1 °C (96.9 °F)-36.8 °C (98.2 °F)]   Pulse:  [71-82]   Resp:  [16-20]   BP: (108-120)/(61-73)   SpO2:  [93 %-100 %]       Significant Labs:  Lab Results   Component Value Date    WBC 7.82 09/24/2023    HGB 9.3 (L) 09/24/2023    HCT 33.1 (L) 09/24/2023     09/24/2023       Lab Results   Component Value Date     (L) 09/24/2023    K 4.7 09/24/2023     09/24/2023    CREATININE 0.7 09/24/2023    BUN 7 09/24/2023    CO2 23 09/24/2023       Lab Results   Component Value Date    TSH 1.391 09/29/2015    HGBA1C 5.7 06/27/2014       EKG:   Results for orders placed or performed during the hospital  encounter of 02/11/16   EKG 12-lead    Collection Time: 02/12/16 12:26 AM    Narrative    Test Reason : R07.9  Vent. Rate : 088 BPM     Atrial Rate : 088 BPM     P-R Int : 122 ms          QRS Dur : 080 ms      QT Int : 354 ms       P-R-T Axes : 055 091 057 degrees     QTc Int : 428 ms    Normal sinus rhythm  Rightward axis  Low voltage QRS  Abnormal ECG  When compared with ECG of 26-JUN-2014 15:32,  No significant change was found  Confirmed by Komal Tracy MD ([id number]) on 2/12/2016 9:41:00 AM    Referred By: SELF REFERRAL           Confirmed By:Komal Tracy MD       ASSESSMENT/PLAN:         Pre-op Assessment    I have reviewed the Patient Summary Reports.     I have reviewed the Nursing Notes. I have reviewed the NPO Status.   I have reviewed the Medications.     Review of Systems      Physical Exam  General: Well nourished, Cooperative, Alert and Oriented    Airway:  Mallampati: I / I  Mouth Opening: Normal  TM Distance: Normal  Tongue: Normal  Neck ROM: Normal ROM    Dental:  Intact        Anesthesia Plan  Type of Anesthesia, risks & benefits discussed:    Anesthesia Type: Gen ETT  Intra-op Monitoring Plan: Standard ASA Monitors  Post Op Pain Control Plan: multimodal analgesia and IV/PO Opioids PRN  Induction:  IV  Airway Plan: Video and Direct  Informed Consent: Informed consent signed with the Patient and all parties understand the risks and agree with anesthesia plan.  All questions answered.   ASA Score: 3  Day of Surgery Review of History & Physical: H&P Update referred to the surgeon/provider.    Ready For Surgery From Anesthesia Perspective.     .

## 2023-09-24 NOTE — NURSING
.[facility] Plan of Care Note     Dx: Abdominal Pain     Shift Events: S/P Laparoscopic Diagnostic Repair Hernia Incisional 9/24/23     Goals of Care: Pain management, IV hydration      Neuro: AAO x 4     Vital Signs: VSS on RA     Respiratory: WDL     Diet: NPO, Sips with meds, Ice chips     Is patient tolerating current diet? Yes     GTTS: D5 1/2 NS 20K @ 75 ml/hr     Urine Output/Bowel Movement: Adequate urine output, LBM 9/21/23     Drains/Tubes/Tube Feeds (include total output/shift): None     Lines: 20g R AC      Accuchecks: None     Skin: Lap sites x 4 with steri strips intact     Fall Risk Score: 0     Activity level?  Independent     Any scheduled procedures? No     Any safety concerns? None     Other:

## 2023-09-24 NOTE — PLAN OF CARE
[facility]  Initial Discharge Assessment       Primary Care Provider: Margarito Neil MD    Admission Diagnosis: Partial small bowel obstruction [K56.600]    Admission Date: 9/23/2023  Expected Discharge Date:     Transition of Care Barriers: None    Payor: [facility] [place] RES[facility] / Plan: [facility] HEALTH / Product Type: Commercial /     Extended Emergency Contact Information  Primary Emergency Contact: Vikki RYAN  Mobile Phone: [phone]  Relation: Mother  Secondary Emergency Contact: Yara Lee  Mobile Phone: [phone]  Relation: Sister    Discharge Plan A: Home  Discharge Plan B: Home with family      [id number] [place] - [address] AT [facility] [address] [place]  [address]  [place] [address]  Phone: [phone] Fax: [phone]      Initial Assessment (most recent)       Adult Discharge Assessment - 09/24/23 1114          Discharge Assessment    Assessment Type Discharge Planning Assessment     Confirmed/corrected address, phone number and insurance Yes     Confirmed Demographics Correct on Facesheet     Source of Information patient     When was your last doctors appointment? 09/01/15     Communicated AMANDA with patient/caregiver Date not available/Unable to determine     Reason For Admission Partial bowel obstruction     People in Home alone     Do you expect to return to your current living situation? Yes     Do you have help at home or someone to help you manage your care at home? No     Current cognitive status: Alert/Oriented     Equipment Currently Used at Home none     Readmission within 30 days? No     Patient currently being followed by outpatient case management? No     Do you currently have service(s) that help you manage your care at home? No     Do you take prescription medications? Yes     Do you have prescription coverage? Yes     Do you have any problems affording any of your prescribed medications? No     Is the patient taking medications as  prescribed? yes     Who is going to help you get home at discharge? Mother-Vikki [phone]     How do you get to doctors appointments? family or friend will provide;car, drives self     Are you on dialysis? No     Do you take coumadin? No     DME Needed Upon Discharge  other (see comments)   TBD    Discharge Plan discussed with: Patient     Transition of Care Barriers None     Discharge Plan A Home     Discharge Plan B Home with family        Physical Activity    On average, how many days per week do you engage in moderate to strenuous exercise (like a brisk walk)? 4 days        Social Connections    How often do you attend Congregational or [employer] services? More than 4 times per year     Do you belong to any clubs or organizations such as Congregational groups, unions, fraternal or athletic groups, or school groups? No     How often do you attend meetings of the clubs or organizations you belong to? Never                      Pt lives alone. Her mother will bring her home upon discharge. She is not on dialysis or coumadin.    Laly Luciano RN    [facility]  [phone]

## 2023-09-24 NOTE — NURSING
Pt arrived on unit.  Pt re-oriented to room, no complaints of pain at present.  Lap sites x 4 visualized with steri strips intact.  Vitals taken & Lovenox administered.

## 2023-09-24 NOTE — SUBJECTIVE & OBJECTIVE
Interval History: NAEON. Significant pain persists. She got temporary relief from the pain following prns overnight, but her epigastric pain has continued to be sharp and persistent between prn doses. She did not tolerate her liquid diet as taking sips of water exacerbates her pain. No nausea at baseline, but some secondary to the pain after trying liquids. AVSS. Abdomen soft. Lab work benign.    Medications:  Continuous Infusions:   dextrose 5 % and 0.45 % NaCl with KCl 20 mEq 75 mL/hr at 09/24/23 0735     Scheduled Meds:   acetaminophen  1,000 mg Intravenous Q8H    enoxparin  40 mg Subcutaneous Daily    gabapentin  300 mg Oral TID    ketorolac  30 mg Intravenous Q6H    methocarbamol (ROBAXIN) IVPB  500 mg Intravenous Q8H    pantoprazole  40 mg Intravenous Daily     PRN Meds:HYDROmorphone, LIDOcaine (PF) 10 mg/ml (1%), ondansetron, oxyCODONE, oxyCODONE, promethazine (PHENERGAN) 12.5 mg in dextrose 5 % (D5W) 50 mL IVPB, QUEtiapine, sodium chloride 0.9%     Review of patient's allergies indicates:   Allergen Reactions    Dilaudid [hydromorphone] Itching    Meperidine Nausea And Vomiting and Itching     Other reaction(s): Other (See Comments)     Objective:     Vital Signs (Most Recent):  Temp: 97.5 °F (36.4 °C) (09/24/23 0800)  Pulse: 73 (09/24/23 0800)  Resp: 20 (09/24/23 0800)  BP: 115/73 (09/24/23 0800)  SpO2: 100 % (09/24/23 0800) Vital Signs (24h Range):  Temp:  [96.9 °F (36.1 °C)-98.2 °F (36.8 °C)] 97.5 °F (36.4 °C)  Pulse:  [71-84] 73  Resp:  [17-20] 20  SpO2:  [93 %-100 %] 100 %  BP: (100-139)/(55-73) 115/73     Weight: 69.6 kg (153 lb 7 oz)  Body mass index is 27.18 kg/m².    Intake/Output - Last 3 Shifts         09/22 0700 09/23 0659 09/23 0700 09/24 0659 09/24 0700 09/25 0659    I.V. (mL/kg)  825 (11.9)     IV Piggyback  450     Total Intake(mL/kg)  1275 (18.3)     Net  +1275            Urine Occurrence  1 x     Stool Occurrence  0 x 0 x             Physical Exam  Vitals reviewed.   Constitutional:        Appearance: Normal appearance.   Cardiovascular:      Rate and Rhythm: Normal rate.      Pulses: Normal pulses.   Pulmonary:      Effort: Pulmonary effort is normal.   Abdominal:      General: There is no distension.      Palpations: Abdomen is soft. There is no mass.      Tenderness: There is abdominal tenderness. There is no guarding or rebound.      Comments: Tenderness to moderate palpation in the epigastric region, otherwise non-tender.    Skin:     General: Skin is warm and dry.   Neurological:      General: No focal deficit present.      Mental Status: She is alert and oriented to person, place, and time.          Significant Labs:  I have reviewed all pertinent lab results within the past 24 hours.  CBC:   Recent Labs   Lab 09/24/23  0256   WBC 7.82   RBC 4.44   HGB 9.3*   HCT 33.1*      MCV 75*   MCH 20.9*   MCHC 28.1*     CMP:   Recent Labs   Lab 09/24/23  0256   *   CALCIUM 8.9   ALBUMIN 3.7   PROT 6.5   *   K 4.7   CO2 23      BUN 7   CREATININE 0.7   ALKPHOS 49*   ALT 14   AST 16   BILITOT 0.6       Significant Diagnostics:  I have reviewed all pertinent imaging results/findings within the past 24 hours.

## 2023-09-24 NOTE — ANESTHESIA PROCEDURE NOTES
Intubation    Date/Time: 9/24/2023 1:32 PM    Performed by: Marlee Ovalle CRNA  Authorized by: George Burrell MD    Intubation:     Induction:  Intravenous    Intubated:  Postinduction    Mask Ventilation:  Easy mask    Attempts:  1    Attempted By:  CRNA    Method of Intubation:  Video laryngoscopy    Blade:  Bloom 3    Laryngeal View Grade: Grade I - full view of cords      Difficult Airway Encountered?: No      Complications:  None    Airway Device:  Oral endotracheal tube    Airway Device Size:  7.0    Style/Cuff Inflation:  Cuffed    Placement Verified By:  Capnometry    Complicating Factors:  None    Findings Post-Intubation:  BS equal bilateral

## 2023-09-24 NOTE — ASSESSMENT & PLAN NOTE
44 yo F with an extensive bariatric surgery history presented on 9/23 with 36 hours of constant mid-abdominal pain and moderately dilated small bowel on imaging. Now s/p diagnostic laparoscopy, reduction of internal hernia, and repair of Galeana's defect on 9/24. Progressing appropriately post-op.    -1 L LR bolus this morning  -Advance diet   -Activity as tolerated  -Potential discharge later today if continuing to do well  -PRN PO/IV analgesia and antiemetics   - No NSAIDs given history of bypass  -Daily labs  -Home meds  -DVT ppx

## 2023-09-24 NOTE — NURSING TRANSFER
Nursing Transfer Note      9/24/2023   5:05 PM    Nurse giving handoff:sharon encinas  Nurse receiving handoff:lawanda encinas     Reason patient is being transferred: d/c critieria met     Transfer To: 1026    Transfer via bed    Transfer with mouth guard upper and lower in patient's mouth .     Transported by escort x2 with ticket to ride     Transfer Vital Signs:  Blood Pressure:see epic   Heart Rate:see epic   O2:room air   Temperature:see epic   Respirations:see epic     Telemetry: in pacu   Order for Tele Monitor?  Here in pacu     Additional Lines: no    4eyes on Skin: yes in pacu     Medicines sent: iv fluids infusing     Any special needs or follow-up needed: monitor abdomen sites     Patient belongings transferred with patient: Yes mouth guards in patient's mouth    Chart send with patient: Yes    Notified: reported to alberto encinas     Patient reassessed at: see epic  (date, time)  1  Upon arrival to floor: to room no complaints no distress noted

## 2023-09-24 NOTE — ASSESSMENT & PLAN NOTE
42 yo F with an extensive bariatric surgery history presented on 9/23 with 36 hours of constant mid-abdominal pain and moderately dilated small bowel on imaging. Differential includes partial small bowel obstruction secondary to anastomotic stricturing, small bowel obstruction secondary to adhesive disease, or gastroenteritis. She continues to have normal vital signs, stable abdominal exam, and lab work; although not tolerating her PO challenge due to worsening pain with any PO intake.    -Will discuss role for further imaging studies with staff  -Continue bariatric clears for PO challenge   -Serial abdominal exams  -PRN PO/IV analgesia and antiemetics  -Daily labs  -Home meds  -DVT ppx

## 2023-09-24 NOTE — PROGRESS NOTES
[facility]  General Surgery  Progress Note    Subjective:     History of Present Illness:  Sarah Padron is our 43 y.o. female with PMH obesity s/p lap band in 2008, lap band removal with conversion to bypass in 2014, ex lap with reduction of internal hernia in 2016, diagnostic lap with reduction of internal hernia 5/2023, and diagnostic lap with JJ anastomosis revision for intussusception in 7/2023 presenting with acute onset mid abdominal pain since 0900 yesterday. She reports that the pain is constant and sharp. She has had rare, mild nausea when belching and denies episodes of emesis. She has not eaten since that morning, but she is keeping fluids down. She presented to the [facility] ED yesterday and reportedly had an unremarkable CT scan. Given her continued pain today, she presented to our ED. Workup here demonstrating no leukocytosis but a mild left shift, normal electrolytes, normal Cr, normal lipase, and moderate small bowel dilation from the level of her JJ anastomosis and proximal.       Post-Op Info:  * No surgery found *         Interval History: NAEON. Significant pain persists. She got temporary relief from the pain following prns overnight, but her epigastric pain has continued to be sharp and persistent between prn doses. She did not tolerate her liquid diet as taking sips of water exacerbates her pain. No nausea at baseline, but some secondary to the pain after trying liquids. AVSS. Abdomen soft. Lab work benign.    Medications:  Continuous Infusions:   dextrose 5 % and 0.45 % NaCl with KCl 20 mEq 75 mL/hr at 09/24/23 0735     Scheduled Meds:   acetaminophen  1,000 mg Intravenous Q8H    enoxparin  40 mg Subcutaneous Daily    gabapentin  300 mg Oral TID    ketorolac  30 mg Intravenous Q6H    methocarbamol (ROBAXIN) IVPB  500 mg Intravenous Q8H    pantoprazole  40 mg Intravenous Daily     PRN Meds:HYDROmorphone, LIDOcaine (PF) 10 mg/ml (1%), ondansetron, oxyCODONE, oxyCODONE,  promethazine (PHENERGAN) 12.5 mg in dextrose 5 % (D5W) 50 mL IVPB, QUEtiapine, sodium chloride 0.9%     Review of patient's allergies indicates:   Allergen Reactions    Dilaudid [hydromorphone] Itching    Meperidine Nausea And Vomiting and Itching     Other reaction(s): Other (See Comments)     Objective:     Vital Signs (Most Recent):  Temp: 97.5 °F (36.4 °C) (09/24/23 0800)  Pulse: 73 (09/24/23 0800)  Resp: 20 (09/24/23 0800)  BP: 115/73 (09/24/23 0800)  SpO2: 100 % (09/24/23 0800) Vital Signs (24h Range):  Temp:  [96.9 °F (36.1 °C)-98.2 °F (36.8 °C)] 97.5 °F (36.4 °C)  Pulse:  [71-84] 73  Resp:  [17-20] 20  SpO2:  [93 %-100 %] 100 %  BP: (100-139)/(55-73) 115/73     Weight: 69.6 kg (153 lb 7 oz)  Body mass index is 27.18 kg/m².    Intake/Output - Last 3 Shifts         09/22 0700  09/23 0659 09/23 0700 09/24 0659 09/24 0700 09/25 0659    I.V. (mL/kg)  825 (11.9)     IV Piggyback  450     Total Intake(mL/kg)  1275 (18.3)     Net  +1275            Urine Occurrence  1 x     Stool Occurrence  0 x 0 x             Physical Exam  Vitals reviewed.   Constitutional:       Appearance: Normal appearance.   Cardiovascular:      Rate and Rhythm: Normal rate.      Pulses: Normal pulses.   Pulmonary:      Effort: Pulmonary effort is normal.   Abdominal:      General: There is no distension.      Palpations: Abdomen is soft. There is no mass.      Tenderness: There is abdominal tenderness. There is no guarding or rebound.      Comments: Tenderness to moderate palpation in the epigastric region, otherwise non-tender.    Skin:     General: Skin is warm and dry.   Neurological:      General: No focal deficit present.      Mental Status: She is alert and oriented to person, place, and time.          Significant Labs:  I have reviewed all pertinent lab results within the past 24 hours.  CBC:   Recent Labs   Lab 09/24/23  0256   WBC 7.82   RBC 4.44   HGB 9.3*   HCT 33.1*      MCV 75*   MCH 20.9*   MCHC 28.1*     CMP:   Recent  Labs   Lab 09/24/23  0256   *   CALCIUM 8.9   ALBUMIN 3.7   PROT 6.5   *   K 4.7   CO2 23      BUN 7   CREATININE 0.7   ALKPHOS 49*   ALT 14   AST 16   BILITOT 0.6       Significant Diagnostics:  I have reviewed all pertinent imaging results/findings within the past 24 hours.    Assessment/Plan:     History of Yogesh-en-Y gastric bypass  42 yo F with an extensive bariatric surgery history presented on 9/23 with 36 hours of constant mid-abdominal pain and moderately dilated small bowel on imaging. Differential includes partial small bowel obstruction secondary to anastomotic stricturing, small bowel obstruction secondary to adhesive disease, or gastroenteritis. She continues to have normal vital signs, stable abdominal exam, and lab work; although not tolerating her PO challenge due to worsening pain with any PO intake.    -Will discuss role for further imaging studies with staff  -Continue bariatric clears for PO challenge   -Serial abdominal exams  -PRN PO/IV analgesia and antiemetics   - No NSAIDs given history of bypass  -Daily labs  -Home meds  -DVT ppx        Carlos Alberto Harper MD  General Surgery  [facility]

## 2023-09-24 NOTE — PLAN OF CARE
Problem: Adult Inpatient Plan of Care  Goal: Plan of Care Review  Outcome: Ongoing, Progressing  Goal: Patient-Specific Goal (Individualized)  Outcome: Ongoing, Progressing  Goal: Absence of Hospital-Acquired Illness or Injury  Outcome: Ongoing, Progressing  Goal: Optimal Comfort and Wellbeing  Outcome: Ongoing, Progressing  Goal: Readiness for Transition of Care  Outcome: Ongoing, Progressing     Problem: Fluid Deficit (Intestinal Obstruction)  Goal: Fluid Balance  Outcome: Ongoing, Progressing     Problem: Infection (Intestinal Obstruction)  Goal: Absence of Infection Signs and Symptoms  Outcome: Ongoing, Progressing     Problem: Pain Acute  Goal: Acceptable Pain Control and Functional Ability  Outcome: Ongoing, Not Progressing     Problem: Nausea and Vomiting (Intestinal Obstruction)  Goal: Nausea and Vomiting Relief  Outcome: Ongoing, Not Progressing     Problem: Pain (Intestinal Obstruction)  Goal: Acceptable Pain Control  Outcome: Ongoing, Not Progressing

## 2023-09-25 VITALS
HEART RATE: 71 BPM | BODY MASS INDEX: 27.19 KG/M2 | DIASTOLIC BLOOD PRESSURE: 52 MMHG | HEIGHT: 63 IN | RESPIRATION RATE: 18 BRPM | SYSTOLIC BLOOD PRESSURE: 97 MMHG | OXYGEN SATURATION: 99 % | WEIGHT: 153.44 LBS | TEMPERATURE: 98 F

## 2023-09-25 LAB
ALBUMIN SERPL BCP-MCNC: 3 G/DL (ref 3.5–5.2)
ALP SERPL-CCNC: 51 U/L (ref 55–135)
ALT SERPL W/O P-5'-P-CCNC: 21 U/L (ref 10–44)
ANION GAP SERPL CALC-SCNC: 6 MMOL/L (ref 8–16)
AST SERPL-CCNC: 23 U/L (ref 10–40)
BASOPHILS # BLD AUTO: 0.04 K/UL (ref 0–0.2)
BASOPHILS NFR BLD: 0.5 % (ref 0–1.9)
BILIRUB SERPL-MCNC: 0.4 MG/DL (ref 0.1–1)
BUN SERPL-MCNC: 3 MG/DL (ref 6–20)
CALCIUM SERPL-MCNC: 8.2 MG/DL (ref 8.7–10.5)
CHLORIDE SERPL-SCNC: 111 MMOL/L (ref 95–110)
CO2 SERPL-SCNC: 24 MMOL/L (ref 23–29)
CREAT SERPL-MCNC: 0.6 MG/DL (ref 0.5–1.4)
DIFFERENTIAL METHOD: ABNORMAL
EOSINOPHIL # BLD AUTO: 0 K/UL (ref 0–0.5)
EOSINOPHIL NFR BLD: 0.3 % (ref 0–8)
ERYTHROCYTE [DISTWIDTH] IN BLOOD BY AUTOMATED COUNT: 18.3 % (ref 11.5–14.5)
EST. GFR  (NO RACE VARIABLE): >60 ML/MIN/1.73 M^2
GLUCOSE SERPL-MCNC: 107 MG/DL (ref 70–110)
HCT VFR BLD AUTO: 29.6 % (ref 37–48.5)
HGB BLD-MCNC: 8.7 G/DL (ref 12–16)
IMM GRANULOCYTES # BLD AUTO: 0.01 K/UL (ref 0–0.04)
IMM GRANULOCYTES NFR BLD AUTO: 0.1 % (ref 0–0.5)
LYMPHOCYTES # BLD AUTO: 1.3 K/UL (ref 1–4.8)
LYMPHOCYTES NFR BLD: 16.4 % (ref 18–48)
MAGNESIUM SERPL-MCNC: 1.9 MG/DL (ref 1.6–2.6)
MCH RBC QN AUTO: 20.7 PG (ref 27–31)
MCHC RBC AUTO-ENTMCNC: 29.4 G/DL (ref 32–36)
MCV RBC AUTO: 70 FL (ref 82–98)
MONOCYTES # BLD AUTO: 0.6 K/UL (ref 0.3–1)
MONOCYTES NFR BLD: 7.6 % (ref 4–15)
NEUTROPHILS # BLD AUTO: 5.7 K/UL (ref 1.8–7.7)
NEUTROPHILS NFR BLD: 75.1 % (ref 38–73)
NRBC BLD-RTO: 0 /100 WBC
PHOSPHATE SERPL-MCNC: 3.1 MG/DL (ref 2.7–4.5)
PLATELET # BLD AUTO: 309 K/UL (ref 150–450)
PMV BLD AUTO: 9.9 FL (ref 9.2–12.9)
POTASSIUM SERPL-SCNC: 3.8 MMOL/L (ref 3.5–5.1)
PROT SERPL-MCNC: 5.5 G/DL (ref 6–8.4)
RBC # BLD AUTO: 4.21 M/UL (ref 4–5.4)
SODIUM SERPL-SCNC: 141 MMOL/L (ref 136–145)
WBC # BLD AUTO: 7.61 K/UL (ref 3.9–12.7)

## 2023-09-25 PROCEDURE — 36415 COLL VENOUS BLD VENIPUNCTURE: CPT

## 2023-09-25 PROCEDURE — C9113 INJ PANTOPRAZOLE SODIUM, VIA: HCPCS

## 2023-09-25 PROCEDURE — 63600175 PHARM REV CODE 636 W HCPCS

## 2023-09-25 PROCEDURE — 25000003 PHARM REV CODE 250

## 2023-09-25 PROCEDURE — 84100 ASSAY OF PHOSPHORUS: CPT

## 2023-09-25 PROCEDURE — 85025 COMPLETE CBC W/AUTO DIFF WBC: CPT

## 2023-09-25 PROCEDURE — 80053 COMPREHEN METABOLIC PANEL: CPT

## 2023-09-25 PROCEDURE — 83735 ASSAY OF MAGNESIUM: CPT

## 2023-09-25 PROCEDURE — G0378 HOSPITAL OBSERVATION PER HR: HCPCS

## 2023-09-25 RX ORDER — OXYCODONE HYDROCHLORIDE 5 MG/1
5 TABLET ORAL EVERY 4 HOURS PRN
Qty: 10 TABLET | Refills: 0 | Status: SHIPPED | OUTPATIENT
Start: 2023-09-25 | End: 2023-10-03

## 2023-09-25 RX ADMIN — OXYCODONE HYDROCHLORIDE 5 MG: 5 TABLET ORAL at 08:09

## 2023-09-25 RX ADMIN — GABAPENTIN 300 MG: 300 CAPSULE ORAL at 08:09

## 2023-09-25 RX ADMIN — ACETAMINOPHEN 650 MG: 325 TABLET ORAL at 05:09

## 2023-09-25 RX ADMIN — OXYCODONE HYDROCHLORIDE 10 MG: 10 TABLET ORAL at 03:09

## 2023-09-25 RX ADMIN — PANTOPRAZOLE SODIUM 40 MG: 40 INJECTION, POWDER, FOR SOLUTION INTRAVENOUS at 08:09

## 2023-09-25 RX ADMIN — ACETAMINOPHEN 650 MG: 325 TABLET ORAL at 12:09

## 2023-09-25 RX ADMIN — METHOCARBAMOL 1000 MG: 100 INJECTION, SOLUTION INTRAMUSCULAR; INTRAVENOUS at 06:09

## 2023-09-25 RX ADMIN — METHOCARBAMOL 1000 MG: 100 INJECTION, SOLUTION INTRAMUSCULAR; INTRAVENOUS at 02:09

## 2023-09-25 RX ADMIN — SODIUM CHLORIDE, POTASSIUM CHLORIDE, SODIUM LACTATE AND CALCIUM CHLORIDE 1000 ML: 600; 310; 30; 20 INJECTION, SOLUTION INTRAVENOUS at 07:09

## 2023-09-25 RX ADMIN — GABAPENTIN 300 MG: 300 CAPSULE ORAL at 02:09

## 2023-09-25 RX ADMIN — DEXTROSE, SODIUM CHLORIDE, AND POTASSIUM CHLORIDE: 5; .45; .15 INJECTION INTRAVENOUS at 03:09

## 2023-09-25 NOTE — ANESTHESIA POSTPROCEDURE EVALUATION
Anesthesia Post Evaluation    Patient: Sarah Padron    Procedure(s) Performed: Procedure(s) (LRB):  LAPAROSCOPY, DIAGNOSTIC (N/A)  REPAIR, HERNIA, INCISIONAL, LAPAROSCOPIC    Final Anesthesia Type: general      Patient location during evaluation: PACU  Patient participation: Yes- Able to Participate  Level of consciousness: awake and alert and oriented  Post-procedure vital signs: reviewed and stable  Pain management: adequate  Airway patency: patent  MARK mitigation strategies: Multimodal analgesia  PONV status at discharge: No PONV  Anesthetic complications: no      Cardiovascular status: blood pressure returned to baseline and hemodynamically stable  Respiratory status: unassisted, spontaneous ventilation and room air  Hydration status: euvolemic  Follow-up not needed.          Vitals Value Taken Time   BP 97/52 09/25/23 1139   Temp 36.7 °C (98.1 °F) 09/25/23 0725   Pulse 71 09/25/23 1139   Resp 18 09/25/23 1139   SpO2 99 % 09/25/23 1139         Event Time   Out of Recovery 09/24/2023 16:00:00         Pain/Wesley Score: Pain Rating Prior to Med Admin: 5 (9/25/2023  8:41 AM)  Pain Rating Post Med Admin: 3 (9/25/2023  6:59 AM)  Wesley Score: 9 (9/24/2023  4:00 PM)

## 2023-09-25 NOTE — SUBJECTIVE & OBJECTIVE
Interval History: NAEON. Feeling back to normal now, with pain resolving and now tolerating clears well. AVSS this morning. BP low-normal for her overnight. Abdominal exam much improved. Lab work benign.    Medications:  Continuous Infusions:   dextrose 5 % and 0.45 % NaCl with KCl 20 mEq 75 mL/hr at 09/25/23 0304     Scheduled Meds:   acetaminophen  650 mg Oral Q6H    enoxparin  40 mg Subcutaneous Daily    gabapentin  300 mg Oral TID    lactated ringers  1,000 mL Intravenous Once    methocarbamol (ROBAXIN) IVPB  1,000 mg Intravenous Q8H    pantoprazole  40 mg Intravenous Daily     PRN Meds:HYDROmorphone, LIDOcaine (PF) 10 mg/ml (1%), ondansetron, ondansetron, oxyCODONE, oxyCODONE, promethazine (PHENERGAN) 12.5 mg in dextrose 5 % (D5W) 50 mL IVPB, QUEtiapine, sodium chloride 0.9%     Review of patient's allergies indicates:   Allergen Reactions    Dilaudid [hydromorphone] Itching    Meperidine Nausea And Vomiting and Itching     Other reaction(s): Other (See Comments)     Objective:     Vital Signs (Most Recent):  Temp: 98.2 °F (36.8 °C) (09/25/23 0449)  Pulse: (!) 54 (09/25/23 0449)  Resp: 18 (09/25/23 0449)  BP: (!) 98/52 (09/25/23 0449)  SpO2: 96 % (09/25/23 0449) Vital Signs (24h Range):  Temp:  [97.1 °F (36.2 °C)-98.8 °F (37.1 °C)] 98.2 °F (36.8 °C)  Pulse:  [54-94] 54  Resp:  [12-20] 18  SpO2:  [96 %-100 %] 96 %  BP: ()/(52-73) 98/52     Weight: 69.6 kg (153 lb 7 oz)  Body mass index is 27.18 kg/m².    Intake/Output - Last 3 Shifts         09/23 0700  09/24 0659 09/24 0700 09/25 0659    I.V. (mL/kg) 825 (11.9)     IV Piggyback 450 2000    Total Intake(mL/kg) 1275 (18.3) 2000 (28.7)    Urine (mL/kg/hr)  0 (0)    Stool  0    Blood  50    Total Output  50    Net +1275 +1950          Urine Occurrence 1 x 2 x    Stool Occurrence 0 x 0 x             Physical Exam  Vitals reviewed.   Constitutional:       Appearance: Normal appearance.   Cardiovascular:      Rate and Rhythm: Normal rate.      Pulses: Normal  pulses.   Pulmonary:      Effort: Pulmonary effort is normal.   Abdominal:      General: There is no distension.      Palpations: Abdomen is soft. There is no mass.      Tenderness: There is no guarding.      Comments: Appropriately tender, incisions healing well with steri strips in place   Skin:     General: Skin is warm and dry.   Neurological:      General: No focal deficit present.      Mental Status: She is alert and oriented to person, place, and time.          Significant Labs:  I have reviewed all pertinent lab results within the past 24 hours.  CBC:   Recent Labs   Lab 09/25/23  0246   WBC 7.61   RBC 4.21   HGB 8.7*   HCT 29.6*      MCV 70*   MCH 20.7*   MCHC 29.4*     CMP:   Recent Labs   Lab 09/25/23  0246      CALCIUM 8.2*   ALBUMIN 3.0*   PROT 5.5*      K 3.8   CO2 24   *   BUN 3*   CREATININE 0.6   ALKPHOS 51*   ALT 21   AST 23   BILITOT 0.4

## 2023-09-25 NOTE — PLAN OF CARE
Problem: Adult Inpatient Plan of Care  Goal: Plan of Care Review  Outcome: Met  Goal: Patient-Specific Goal (Individualized)  Outcome: Met  Goal: Absence of Hospital-Acquired Illness or Injury  Outcome: Met  Goal: Optimal Comfort and Wellbeing  Outcome: Met  Goal: Readiness for Transition of Care  Outcome: Met     Problem: Pain Acute  Goal: Acceptable Pain Control and Functional Ability  Outcome: Met     Problem: Fluid Deficit (Intestinal Obstruction)  Goal: Fluid Balance  Outcome: Met     Problem: Infection (Intestinal Obstruction)  Goal: Absence of Infection Signs and Symptoms  Outcome: Met     Problem: Nausea and Vomiting (Intestinal Obstruction)  Goal: Nausea and Vomiting Relief  Outcome: Met     Problem: Pain (Intestinal Obstruction)  Goal: Acceptable Pain Control  Outcome: Met     Problem: Fall Injury Risk  Goal: Absence of Fall and Fall-Related Injury  Outcome: Met

## 2023-09-25 NOTE — PROGRESS NOTES
[facility]  General Surgery  Progress Note    Subjective:     History of Present Illness:  Sarah Padron is our 43 y.o. female with PMH obesity s/p lap band in 2008, lap band removal with conversion to bypass in 2014, ex lap with reduction of internal hernia in 2016, diagnostic lap with reduction of internal hernia 5/2023, and diagnostic lap with JJ anastomosis revision for intussusception in 7/2023 presenting with acute onset mid abdominal pain since 0900 yesterday. She reports that the pain is constant and sharp. She has had rare, mild nausea when belching and denies episodes of emesis. She has not eaten since that morning, but she is keeping fluids down. She presented to the [facility] ED yesterday and reportedly had an unremarkable CT scan. Given her continued pain today, she presented to our ED. Workup here demonstrating no leukocytosis but a mild left shift, normal electrolytes, normal Cr, normal lipase, and moderate small bowel dilation from the level of her JJ anastomosis and proximal.       Post-Op Info:  Procedure(s) (LRB):  LAPAROSCOPY, DIAGNOSTIC (N/A)  REPAIR, HERNIA, INCISIONAL, LAPAROSCOPIC   1 Day Post-Op     Interval History: NAEON. Feeling back to normal now, with pain resolving and now tolerating clears well. AVSS this morning. BP low-normal for her overnight. Abdominal exam much improved. Lab work benign.    Medications:  Continuous Infusions:   dextrose 5 % and 0.45 % NaCl with KCl 20 mEq 75 mL/hr at 09/25/23 0304     Scheduled Meds:   acetaminophen  650 mg Oral Q6H    enoxparin  40 mg Subcutaneous Daily    gabapentin  300 mg Oral TID    lactated ringers  1,000 mL Intravenous Once    methocarbamol (ROBAXIN) IVPB  1,000 mg Intravenous Q8H    pantoprazole  40 mg Intravenous Daily     PRN Meds:HYDROmorphone, LIDOcaine (PF) 10 mg/ml (1%), ondansetron, ondansetron, oxyCODONE, oxyCODONE, promethazine (PHENERGAN) 12.5 mg in dextrose 5 % (D5W) 50 mL IVPB, QUEtiapine, sodium  chloride 0.9%     Review of patient's allergies indicates:   Allergen Reactions    Dilaudid [hydromorphone] Itching    Meperidine Nausea And Vomiting and Itching     Other reaction(s): Other (See Comments)     Objective:     Vital Signs (Most Recent):  Temp: 98.2 °F (36.8 °C) (09/25/23 0449)  Pulse: (!) 54 (09/25/23 0449)  Resp: 18 (09/25/23 0449)  BP: (!) 98/52 (09/25/23 0449)  SpO2: 96 % (09/25/23 0449) Vital Signs (24h Range):  Temp:  [97.1 °F (36.2 °C)-98.8 °F (37.1 °C)] 98.2 °F (36.8 °C)  Pulse:  [54-94] 54  Resp:  [12-20] 18  SpO2:  [96 %-100 %] 96 %  BP: ()/(52-73) 98/52     Weight: 69.6 kg (153 lb 7 oz)  Body mass index is 27.18 kg/m².    Intake/Output - Last 3 Shifts         09/23 0700  09/24 0659 09/24 0700  09/25 0659    I.V. (mL/kg) 825 (11.9)     IV Piggyback 450 2000    Total Intake(mL/kg) 1275 (18.3) 2000 (28.7)    Urine (mL/kg/hr)  0 (0)    Stool  0    Blood  50    Total Output  50    Net +1275 +1950          Urine Occurrence 1 x 2 x    Stool Occurrence 0 x 0 x             Physical Exam  Vitals reviewed.   Constitutional:       Appearance: Normal appearance.   Cardiovascular:      Rate and Rhythm: Normal rate.      Pulses: Normal pulses.   Pulmonary:      Effort: Pulmonary effort is normal.   Abdominal:      General: There is no distension.      Palpations: Abdomen is soft. There is no mass.      Tenderness: There is no guarding.      Comments: Appropriately tender, incisions healing well with steri strips in place   Skin:     General: Skin is warm and dry.   Neurological:      General: No focal deficit present.      Mental Status: She is alert and oriented to person, place, and time.          Significant Labs:  I have reviewed all pertinent lab results within the past 24 hours.  CBC:   Recent Labs   Lab 09/25/23  0246   WBC 7.61   RBC 4.21   HGB 8.7*   HCT 29.6*      MCV 70*   MCH 20.7*   MCHC 29.4*     CMP:   Recent Labs   Lab 09/25/23  0246      CALCIUM 8.2*   ALBUMIN 3.0*    PROT 5.5*      K 3.8   CO2 24   *   BUN 3*   CREATININE 0.6   ALKPHOS 51*   ALT 21   AST 23   BILITOT 0.4         Assessment/Plan:     History of Yogesh-en-Y gastric bypass  44 yo F with an extensive bariatric surgery history presented on 9/23 with 36 hours of constant mid-abdominal pain and moderately dilated small bowel on imaging. Now s/p diagnostic laparoscopy, reduction of internal hernia, and repair of Galeana's defect on 9/24. Progressing appropriately post-op.    -1 L LR bolus this morning  -Advance diet   -Activity as tolerated  -Potential discharge later today if continuing to do well  -PRN PO/IV analgesia and antiemetics   - No NSAIDs given history of bypass  -Daily labs  -Home meds  -DVT ppx        Carlos Alberto Harper MD  General Surgery  [facility]

## 2023-09-25 NOTE — PLAN OF CARE
[facility] Plan of Care Note  Dx: Yogesh-en-Y bypass complication    Shift Events: adequate pain management, hypotensive episode x1, patient rested well overnight    Goals of Care: pain control; return of bowel function    Neuro: ANOx4    Vital Signs: 98.2 / 54 / 18 / 96% RA / 98/52    Respiratory: RA    Diet: NPO    Is patient tolerating current diet? Yes    GTTS: House @ 75; LR bolus 1L    Urine Output/Bowel Movement: UOP clear yellow per hat    Drains/Tubes/Tube Feeds (include total output/shift): N/A    Lines: B/L FA 20g      Accuchecks: N/A    Skin: 4 laps + DB    Fall Risk Score: 7    Activity level? Up with SBA     Any scheduled procedures? N/A    Any safety concerns? None     Other: N/A     Problem: Adult Inpatient Plan of Care  Goal: Optimal Comfort and Wellbeing  Outcome: Ongoing, Progressing     Problem: Pain Acute  Goal: Acceptable Pain Control and Functional Ability  Outcome: Ongoing, Progressing     Problem: Fluid Deficit (Intestinal Obstruction)  Goal: Fluid Balance  Outcome: Ongoing, Progressing     Problem: Fall Injury Risk  Goal: Absence of Fall and Fall-Related Injury  Outcome: Ongoing, Progressing     Problem: Infection (Intestinal Obstruction)  Goal: Absence of Infection Signs and Symptoms  Outcome: Ongoing, Progressing

## 2023-09-27 ENCOUNTER — PATIENT OUTREACH (OUTPATIENT)
Dept: ADMINISTRATIVE | Facility: CLINIC | Age: 43
End: 2023-09-27
Payer: COMMERCIAL

## 2023-09-27 NOTE — DISCHARGE SUMMARY
[facility]  General Surgery  Discharge Summary      Patient Name: Sarah Padron  MRN: [id number]  Admission Date: 9/23/2023  Hospital Length of Stay: 1 days  Discharge Date and Time:  09/25/2023  2:00 PM  Attending Physician: Kenyatta att. providers found   Discharging Provider: Carlos Alberto Harper MD  Primary Care Provider: Margarito Neil MD    HPI:   Sarah Padron is our 43 y.o. female with PMH obesity s/p lap band in 2008, lap band removal with conversion to bypass in 2014, ex lap with reduction of internal hernia in 2016, diagnostic lap with reduction of internal hernia 5/2023, and diagnostic lap with JJ anastomosis revision for intussusception in 7/2023 presenting with acute onset mid abdominal pain since 0900 yesterday. She reports that the pain is constant and sharp. She has had rare, mild nausea when belching and denies episodes of emesis. She has not eaten since that morning, but she is keeping fluids down. She presented to the [facility] ED yesterday and reportedly had an unremarkable CT scan. Given her continued pain today, she presented to our ED. Workup here demonstrating no leukocytosis but a mild left shift, normal electrolytes, normal Cr, normal lipase, and moderate small bowel dilation from the level of her JJ anastomosis and proximal.       Procedure(s) (LRB):  LAPAROSCOPY, DIAGNOSTIC (N/A)  REPAIR, HERNIA, INCISIONAL, LAPAROSCOPIC      Indwelling Lines/Drains at time of discharge:   Lines/Drains/Airways     None               Hospital Course: Ms. Padron continued to have severe abdominal pain exacerbated by any PO intake following admission, therefore we took her for diagnostic laparoscopy on 9/24 and found an internal hernia through Galeana's space. The bowel was reduced and the space was closed. She tolerated the procedure well and was medically stable for discharge on POD 1. We will plan for follow up in 2 weeks.      Goals of Care Treatment Preferences:  Code Status:  Full Code      Consults:   Consults (From admission, onward)        Status Ordering Provider     Inpatient consult to General Surgery  Once        Provider:  (Not yet assigned)    Completed TAMMY EDEN          Significant Diagnostic Studies: N/A    Pending Diagnostic Studies:     None        Final Active Diagnoses:    Diagnosis Date Noted POA    PRINCIPAL PROBLEM:  History of Yogesh-en-Y gastric bypass [Z98.84] 08/11/2014 Not Applicable      Problems Resolved During this Admission:      Discharged Condition: good    Disposition: Home or Self Care    Follow Up:   Follow-up Information     Adal Pineda Jr., MD Follow up in 2 week(s).    Specialties: General Surgery, Bariatrics  Why: For wound re-check, post op follow up.  Contact information:  [facility] [id number] [phone]                       Patient Instructions:      Diet Adult Regular     Lifting restrictions   Order Comments: Don't lift more than 10lbs for at least 6 weeks.     No driving until:   Order Comments: No driving while using narcotics.     No dressing needed     Notify your health care provider if you experience any of the following:  temperature >100.4     Notify your health care provider if you experience any of the following:  persistent nausea and vomiting or diarrhea     Notify your health care provider if you experience any of the following:  severe uncontrolled pain     Notify your health care provider if you experience any of the following:  redness, tenderness, or signs of infection (pain, swelling, redness, odor or green/yellow discharge around incision site)     Notify your health care provider if you experience any of the following:  difficulty breathing or increased cough     Notify your health care provider if you experience any of the following:  increased confusion or weakness     Activity as tolerated     Shower on day dressing removed (No bath)   Order Comments: May shower tomorrow.  Don't soak  in a bath, pool, lake, etc for at least 2 weeks.     Medications:  Reconciled Home Medications:      Medication List      START taking these medications    oxyCODONE 5 MG immediate release tablet  Commonly known as: ROXICODONE  Take 1 tablet (5 mg total) by mouth every 4 (four) hours as needed for Pain.        CONTINUE taking these medications    amitriptyline 25 MG tablet  Commonly known as: ELAVIL  Take 1 tablet (25 mg total) by mouth nightly as needed for Insomnia.     * ergocalciferol 50,000 unit Cap  Commonly known as: VITAMIN D2  Take 1 capsule (50,000 Units total) by mouth twice a week.     * ergocalciferol 50,000 unit Cap  Commonly known as: ERGOCALCIFEROL  Take 1 capsule (50,000 Units total) by mouth twice a week.     esomeprazole 40 mg Grps  Commonly known as: NexIUM Packet  Take 40 mg by mouth before breakfast. Open capsule and dissolve pellets in water     pediatric multivit-iron-min Chew  Take 1 tablet by mouth 2 (two) times daily.     polyethylene glycol 17 gram Pwpk  Commonly known as: GLYCOLAX  Take 17 g by mouth once daily.     promethazine 25 MG tablet  Commonly known as: PHENERGAN  Take 0.5 tablets (12.5 mg total) by mouth every 6 (six) hours as needed for Nausea.     QUEtiapine 25 MG Tab  Commonly known as: SEROQUEL  Take 50 mg by mouth nightly as needed.         * This list has 2 medication(s) that are the same as other medications prescribed for you. Read the directions carefully, and ask your doctor or other care provider to review them with you.              Time spent on the discharge of patient: 10 minutes    Carlos Alberto Harper MD  General Surgery  [facility]  [facility]

## 2023-09-27 NOTE — HOSPITAL COURSE
Ms. Padron continued to have severe abdominal pain exacerbated by any PO intake following admission, therefore we took her for diagnostic laparoscopy on 9/24 and found an internal hernia through Galeana's space. The bowel was reduced and the space was closed. She tolerated the procedure well and was medically stable for discharge on POD 1. We will plan for follow up in 2 weeks.

## 2023-10-03 ENCOUNTER — OFFICE VISIT (OUTPATIENT)
Dept: SURGERY | Facility: CLINIC | Age: 43
End: 2023-10-03
Payer: COMMERCIAL

## 2023-10-03 VITALS
DIASTOLIC BLOOD PRESSURE: 51 MMHG | HEART RATE: 85 BPM | SYSTOLIC BLOOD PRESSURE: 103 MMHG | HEIGHT: 63 IN | BODY MASS INDEX: 26.62 KG/M2 | WEIGHT: 150.25 LBS

## 2023-10-03 DIAGNOSIS — R10.84 GENERALIZED ABDOMINAL PAIN: ICD-10-CM

## 2023-10-03 DIAGNOSIS — R10.84 GENERALIZED ABDOMINAL PAIN: Primary | ICD-10-CM

## 2023-10-03 DIAGNOSIS — Z98.84 HISTORY OF ROUX-EN-Y GASTRIC BYPASS: Primary | ICD-10-CM

## 2023-10-03 PROCEDURE — 3078F PR MOST RECENT DIASTOLIC BLOOD PRESSURE < 80 MM HG: ICD-10-PCS | Mod: CPTII,S$GLB,, | Performed by: SURGERY

## 2023-10-03 PROCEDURE — 99024 PR POST-OP FOLLOW-UP VISIT: ICD-10-PCS | Mod: S$GLB,,, | Performed by: SURGERY

## 2023-10-03 PROCEDURE — 1159F MED LIST DOCD IN RCRD: CPT | Mod: CPTII,S$GLB,, | Performed by: SURGERY

## 2023-10-03 PROCEDURE — 99024 POSTOP FOLLOW-UP VISIT: CPT | Mod: S$GLB,,, | Performed by: SURGERY

## 2023-10-03 PROCEDURE — 3074F PR MOST RECENT SYSTOLIC BLOOD PRESSURE < 130 MM HG: ICD-10-PCS | Mod: CPTII,S$GLB,, | Performed by: SURGERY

## 2023-10-03 PROCEDURE — 99999 PR PBB SHADOW E&M-EST. PATIENT-LVL III: CPT | Mod: PBBFAC,,, | Performed by: SURGERY

## 2023-10-03 PROCEDURE — 3074F SYST BP LT 130 MM HG: CPT | Mod: CPTII,S$GLB,, | Performed by: SURGERY

## 2023-10-03 PROCEDURE — 1160F PR REVIEW ALL MEDS BY PRESCRIBER/CLIN PHARMACIST DOCUMENTED: ICD-10-PCS | Mod: CPTII,S$GLB,, | Performed by: SURGERY

## 2023-10-03 PROCEDURE — 3078F DIAST BP <80 MM HG: CPT | Mod: CPTII,S$GLB,, | Performed by: SURGERY

## 2023-10-03 PROCEDURE — 1159F PR MEDICATION LIST DOCUMENTED IN MEDICAL RECORD: ICD-10-PCS | Mod: CPTII,S$GLB,, | Performed by: SURGERY

## 2023-10-03 PROCEDURE — 99999 PR PBB SHADOW E&M-EST. PATIENT-LVL III: ICD-10-PCS | Mod: PBBFAC,,, | Performed by: SURGERY

## 2023-10-03 PROCEDURE — 1160F RVW MEDS BY RX/DR IN RCRD: CPT | Mod: CPTII,S$GLB,, | Performed by: SURGERY

## 2023-10-03 RX ORDER — BUPROPION HYDROCHLORIDE 150 MG/1
TABLET, EXTENDED RELEASE ORAL
COMMUNITY
Start: 2023-08-04 | End: 2024-03-20

## 2023-10-03 RX ORDER — TRAZODONE HYDROCHLORIDE 100 MG/1
100 TABLET ORAL NIGHTLY PRN
COMMUNITY
Start: 2023-09-02 | End: 2024-03-20

## 2023-10-03 RX ORDER — VALACYCLOVIR HYDROCHLORIDE 1 G/1
1000 TABLET, FILM COATED ORAL 2 TIMES DAILY
COMMUNITY
Start: 2023-09-02 | End: 2024-03-20
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
## 2023-10-06 ENCOUNTER — HOSPITAL ENCOUNTER (OUTPATIENT)
Dept: RADIOLOGY | Facility: HOSPITAL | Age: 43
Discharge: HOME | End: 2023-10-06
Attending: SURGERY
Payer: COMMERCIAL

## 2023-10-06 DIAGNOSIS — R10.84 GENERALIZED ABDOMINAL PAIN: ICD-10-CM

## 2023-10-06 PROCEDURE — 74177 CT ABD & PELVIS W/CONTRAST: CPT | Mod: TC

## 2023-10-06 PROCEDURE — 74177 CT ABDOMEN PELVIS WITH CONTRAST: ICD-10-PCS | Mod: 26,,, | Performed by: RADIOLOGY

## 2023-10-06 PROCEDURE — 74177 CT ABD & PELVIS W/CONTRAST: CPT | Mod: 26,,, | Performed by: RADIOLOGY

## 2023-10-06 PROCEDURE — 25500020 PHARM REV CODE 255: Performed by: SURGERY

## 2023-10-06 RX ADMIN — IOHEXOL 75 ML: 350 INJECTION, SOLUTION INTRAVENOUS at 03:10

## 2023-10-16 ENCOUNTER — TELEPHONE (OUTPATIENT)
Dept: SURGERY | Facility: CLINIC | Age: 43
End: 2023-10-16
Payer: COMMERCIAL

## 2023-10-16 NOTE — TELEPHONE ENCOUNTER
I spoke with the patient and advised that her CT was normal and that she could discuss with Dr. Barnes if she wanted, but that he recommends that she f/u with her PCP.  She said she would f/u with PCP. Pt verbalized understanding to all and has no further questions at this time.

## 2023-11-17 ENCOUNTER — PATIENT MESSAGE (OUTPATIENT)
Dept: RESEARCH | Facility: HOSPITAL | Age: 43
End: 2023-11-17
Payer: COMMERCIAL

## 2023-12-12 ENCOUNTER — OFFICE VISIT (OUTPATIENT)
Dept: NEUROSURGERY | Facility: CLINIC | Age: 43
End: 2023-12-12
Payer: COMMERCIAL

## 2023-12-12 DIAGNOSIS — M54.16 LUMBAR RADICULOPATHY, CHRONIC: ICD-10-CM

## 2023-12-12 DIAGNOSIS — M48.02 SPINAL STENOSIS, CERVICAL REGION: Primary | ICD-10-CM

## 2023-12-12 PROCEDURE — 99214 PR OFFICE/OUTPT VISIT, EST, LEVL IV, 30-39 MIN: ICD-10-PCS | Mod: 95,,, | Performed by: STUDENT IN AN ORGANIZED HEALTH CARE EDUCATION/TRAINING PROGRAM

## 2023-12-12 PROCEDURE — 99214 OFFICE O/P EST MOD 30 MIN: CPT | Mod: 95,,, | Performed by: STUDENT IN AN ORGANIZED HEALTH CARE EDUCATION/TRAINING PROGRAM

## 2023-12-12 NOTE — PROGRESS NOTES
Neurosurgery  History & Physical    SUBJECTIVE:     Chief Complaint: Chronic low back pain with b/l radiculopathy    History of Present Illness:  Sarah Padron is our 43 y.o. woman w/PMHx obesity s/p lap band in 2008, lap band removal with conversion to bypass in 2014, ex lap with reduction of internal hernia in 2016, diagnostic lap with reduction of internal hernia 5/2023, and diagnostic lap with JJ anastomosis revision for intussusception in 7/2023 presenting as a virtual visit with chronic low back pain and weakness in both legs progressively worsening over the past year. She explains whenever she crouches down she now has to use her arms to push herself back up and at times she has been worried her legs may give out. She denies any actual falls. Additionally denies radicular pain, sensory loss, saddle anesthesia, bladder or bowel dysfunction. She has also had several episodes over the years of profound neck pain with LUE radiculopathy with weakness and numbness, but each episode has resolved with medical management. MRI C-spine done in 2015 revealed canal stenosis at C5-C6 and C6-C7. She has not tried physical therapy or AUGUST.     Review of patient's allergies indicates:   Allergen Reactions    Dilaudid [hydromorphone] Itching    Meperidine Nausea And Vomiting and Itching     Other reaction(s): Other (See Comments)       Current Outpatient Medications   Medication Sig Dispense Refill    acyclovir 5% (ZOVIRAX) 5 % ointment Apply topically every 3 (three) hours 15 g 0    buPROPion (WELLBUTRIN SR) 150 MG TBSR 12 hr tablet       buPROPion (WELLBUTRIN XL) 150 MG TB24 tablet Take 1 tablet (150 mg total) by mouth 2 (two) times a day. 180 tablet 0    tranexamic acid (LYSTEDA) 650 mg tablet Take 2 tablets by mouth 3 (three) times daily 30 tablet 3    traZODone (DESYREL) 100 MG tablet Take 100 mg by mouth nightly as needed.      traZODone (DESYREL) 100 MG tablet Take 1 tablet (100 mg total) by mouth nightly as  needed for insomnia 30 tablet 0    valACYclovir (VALTREX) 1000 MG tablet Take 1,000 mg by mouth 2 (two) times daily.      valACYclovir (VALTREX) 1000 MG tablet Take 1 tablet by mouth 2 (two) times daily 60 tablet 0     No current facility-administered medications for this visit.       No past medical history on file.  Past Surgical History:   Procedure Laterality Date    ABDOMINAL SURGERY      BREAST SURGERY      lift    CHOLECYSTECTOMY      DIAGNOSTIC LAPAROSCOPY N/A 5/18/2023    Procedure: LAPAROSCOPY, DIAGNOSTIC;  Surgeon: Davy Barnes MD;  Location: [address];  Service: General;  Laterality: N/A;  REPAIR INTERNAL HERNIA    DIAGNOSTIC LAPAROSCOPY N/A 7/5/2023    Procedure: LAPAROSCOPY, DIAGNOSTIC with revision of JJ anastomosis;  Surgeon: Davy Barnes MD;  Location: [address];  Service: General;  Laterality: N/A;    DIAGNOSTIC LAPAROSCOPY N/A 9/24/2023    Procedure: LAPAROSCOPY, DIAGNOSTIC;  Surgeon: Adal Pineda Jr., MD;  Location: [address];  Service: General;  Laterality: N/A;    EYE SURGERY Right     cataract    GASTRIC BYPASS      HERNIA REPAIR N/A 2016    internal    lap band removal  7/9/14    LAPAROSCOPIC GASTRIC BANDING      LAPAROSCOPIC GASTRIC BANDING  2008    revision    LAPAROSCOPIC REPAIR OF INCISIONAL HERNIA  9/24/2023    Procedure: REPAIR, HERNIA, INCISIONAL, LAPAROSCOPIC;  Surgeon: Adal Pineda Jr., MD;  Location: [address];  Service: General;;     Family History       Problem Relation (Age of Onset)    Hypertension Mother, Father    Obesity Father          Social History     Socioeconomic History    Marital status:    Tobacco Use    Smoking status: Never    Smokeless tobacco: Never   Substance and Sexual Activity    Alcohol use: Yes     Comment: ocassional; wine    Drug use: No    Sexual activity: Yes     Partners: Male     Social Determinants of Health     Financial Resource Strain: Low Risk  (7/5/2023)    Overall Financial  Resource Strain (CARDIA)     Difficulty of Paying Living Expenses: Not very hard   Food Insecurity: No Food Insecurity (7/5/2023)    Hunger Vital Sign     Worried About Running Out of Food in the Last Year: Never true     Ran Out of Food in the Last Year: Never true   Transportation Needs: No Transportation Needs (7/5/2023)    PRAPARE - Transportation     Lack of Transportation (Medical): No     Lack of Transportation (Non-Medical): No   Physical Activity: Unknown (9/24/2023)    Exercise Vital Sign     Days of Exercise per Week: 4 days   Social Connections: Moderately Isolated (9/24/2023)    Social Connection and Isolation Panel [NHANES]     Frequency of Communication with Friends and Family: More than three times a week     Frequency of Social Gatherings with Friends and Family: More than three times a week     Attends Caodaism Services: More than 4 times per year     Active Member of Clubs or Organizations: No     Attends Club or Organization Meetings: Never     Marital Status:    Housing Stability: Unknown (7/5/2023)    Housing Stability Vital Sign     Unable to Pay for Housing in the Last Year: No     Unstable Housing in the Last Year: No       Review of Systems    OBJECTIVE:     Vital Signs     There is no height or weight on file to calculate BMI.      Physical Therapy:   General: well developed, well nourished, no distress.   Head: normocephalic, atraumatic  Mental Status: Awake, Alert, Oriented  Speech: Clear with content appropriate to conversation  Cranial nerves: CN II-XII grossly intact.   Sensory: Unable to test d/t virtual visit    Motor Strength: exam limited d/t virtual visit  Able to move all extremities antigravity. Able to stand/ambulate on both toes and heels.     Gait steady with normal base and arm swing.     Diagnostic Results:  4/22/2015 MRI Cervical Spine Without Contrast  -Canal stenosis at C5-C6 and C6-C7, and L sided foraminal stenosis at C5-C6.     I have personally reviewed the  above referenced imaging.     No lumbar imaging to review.     ASSESSMENT/PLAN:     Sarah Padron is our 43 y.o. woman w/history of multiple abdominal surgeries presenting with low back pain and b/l lower extremity weakness progressively worsening over the past year, and multiple episodes of neck pain with LUE radiculopathy since 2015. She denies alarm symptoms including sensory loss, saddle anesthesia, bowel or bladder dysfunction. Motor exam limited in today's visit by the virtual encounter, however her strength is grossly intact and she is able to ambulate on both her toes and her heels. MRI Cervical spine from 2015 shows mild canal stenosis from C5-C6 and C6-C7 with L foraminal stenosis at C5-C6. Given the lower extremity weakness and the cervical findings on MRI from 2015, she would benefit from additional MRIs of her cervical and lumbar spine. I also referred her to physical therapy and the Pain Management service for a trial of non-surgical management. She is to follow up in clinic with me in 6 weeks. We discussed alarm symptoms that should prompt a sooner visit or a trip to the ED. All questions were answered.

## 2023-12-19 ENCOUNTER — HOSPITAL ENCOUNTER (OUTPATIENT)
Dept: RADIOLOGY | Facility: HOSPITAL | Age: 43
Discharge: HOME | End: 2023-12-19
Attending: STUDENT IN AN ORGANIZED HEALTH CARE EDUCATION/TRAINING PROGRAM
Payer: COMMERCIAL

## 2023-12-19 ENCOUNTER — PATIENT MESSAGE (OUTPATIENT)
Dept: PAIN MEDICINE | Facility: CLINIC | Age: 43
End: 2023-12-19

## 2023-12-19 ENCOUNTER — OFFICE VISIT (OUTPATIENT)
Dept: PAIN MEDICINE | Facility: CLINIC | Age: 43
End: 2023-12-19
Payer: COMMERCIAL

## 2023-12-19 VITALS
HEIGHT: 63 IN | HEART RATE: 86 BPM | DIASTOLIC BLOOD PRESSURE: 63 MMHG | WEIGHT: 144.06 LBS | SYSTOLIC BLOOD PRESSURE: 92 MMHG | BODY MASS INDEX: 25.52 KG/M2

## 2023-12-19 DIAGNOSIS — M54.9 DORSALGIA: ICD-10-CM

## 2023-12-19 DIAGNOSIS — M54.16 LUMBAR RADICULOPATHY, CHRONIC: ICD-10-CM

## 2023-12-19 DIAGNOSIS — M51.36 DDD (DEGENERATIVE DISC DISEASE), LUMBAR: Primary | ICD-10-CM

## 2023-12-19 DIAGNOSIS — M51.36 DDD (DEGENERATIVE DISC DISEASE), LUMBAR: ICD-10-CM

## 2023-12-19 PROCEDURE — 99204 PR OFFICE/OUTPT VISIT, NEW, LEVL IV, 45-59 MIN: ICD-10-PCS | Mod: S$GLB,,, | Performed by: STUDENT IN AN ORGANIZED HEALTH CARE EDUCATION/TRAINING PROGRAM

## 2023-12-19 PROCEDURE — 3074F SYST BP LT 130 MM HG: CPT | Mod: CPTII,S$GLB,, | Performed by: STUDENT IN AN ORGANIZED HEALTH CARE EDUCATION/TRAINING PROGRAM

## 2023-12-19 PROCEDURE — 3008F PR BODY MASS INDEX (BMI) DOCUMENTED: ICD-10-PCS | Mod: CPTII,S$GLB,, | Performed by: STUDENT IN AN ORGANIZED HEALTH CARE EDUCATION/TRAINING PROGRAM

## 2023-12-19 PROCEDURE — 3078F DIAST BP <80 MM HG: CPT | Mod: CPTII,S$GLB,, | Performed by: STUDENT IN AN ORGANIZED HEALTH CARE EDUCATION/TRAINING PROGRAM

## 2023-12-19 PROCEDURE — 99999 PR PBB SHADOW E&M-EST. PATIENT-LVL IV: ICD-10-PCS | Mod: PBBFAC,,, | Performed by: STUDENT IN AN ORGANIZED HEALTH CARE EDUCATION/TRAINING PROGRAM

## 2023-12-19 PROCEDURE — 72110 XR LUMBAR SPINE COMPLETE 5 VIEW: ICD-10-PCS | Mod: 26,,, | Performed by: RADIOLOGY

## 2023-12-19 PROCEDURE — 72110 X-RAY EXAM L-2 SPINE 4/>VWS: CPT | Mod: TC,FY

## 2023-12-19 PROCEDURE — 1159F PR MEDICATION LIST DOCUMENTED IN MEDICAL RECORD: ICD-10-PCS | Mod: CPTII,S$GLB,, | Performed by: STUDENT IN AN ORGANIZED HEALTH CARE EDUCATION/TRAINING PROGRAM

## 2023-12-19 PROCEDURE — 99204 OFFICE O/P NEW MOD 45 MIN: CPT | Mod: S$GLB,,, | Performed by: STUDENT IN AN ORGANIZED HEALTH CARE EDUCATION/TRAINING PROGRAM

## 2023-12-19 PROCEDURE — 3008F BODY MASS INDEX DOCD: CPT | Mod: CPTII,S$GLB,, | Performed by: STUDENT IN AN ORGANIZED HEALTH CARE EDUCATION/TRAINING PROGRAM

## 2023-12-19 PROCEDURE — 1159F MED LIST DOCD IN RCRD: CPT | Mod: CPTII,S$GLB,, | Performed by: STUDENT IN AN ORGANIZED HEALTH CARE EDUCATION/TRAINING PROGRAM

## 2023-12-19 PROCEDURE — 72110 X-RAY EXAM L-2 SPINE 4/>VWS: CPT | Mod: 26,,, | Performed by: RADIOLOGY

## 2023-12-19 PROCEDURE — 3074F PR MOST RECENT SYSTOLIC BLOOD PRESSURE < 130 MM HG: ICD-10-PCS | Mod: CPTII,S$GLB,, | Performed by: STUDENT IN AN ORGANIZED HEALTH CARE EDUCATION/TRAINING PROGRAM

## 2023-12-19 PROCEDURE — 3078F PR MOST RECENT DIASTOLIC BLOOD PRESSURE < 80 MM HG: ICD-10-PCS | Mod: CPTII,S$GLB,, | Performed by: STUDENT IN AN ORGANIZED HEALTH CARE EDUCATION/TRAINING PROGRAM

## 2023-12-19 PROCEDURE — 99999 PR PBB SHADOW E&M-EST. PATIENT-LVL IV: CPT | Mod: PBBFAC,,, | Performed by: STUDENT IN AN ORGANIZED HEALTH CARE EDUCATION/TRAINING PROGRAM

## 2023-12-19 RX ORDER — MELOXICAM 15 MG/1
15 TABLET ORAL DAILY
Qty: 30 TABLET | Refills: 0 | Status: SHIPPED | OUTPATIENT
Start: 2023-12-19 | End: 2024-01-18

## 2023-12-19 NOTE — PROGRESS NOTES
[facility] - New Patient Evaluation    Referred by: Any Squires   Reason for referral: Lumbar radiculopathy, chronic     CC:   Chief Complaint   Patient presents with    Low-back Pain         12/19/2023     8:55 AM 12/19/2023     8:46 AM 6/2/2015     1:21 PM   Last 3 PDI Scores   Pain Disability Index (PDI) 43 [phone] 9       Subjective 12/19/23  Sarah Padron is a 43 y.o. female who presents complaining of lower back pain.  Pain onset was gradual.  No history of trauma or injury.  No history of back surgeries.  Pain does not radiate.  Pain is worse with forward flexion and lifting.  She was referred to physical therapy and starts next week on 12/26/2023.  Lumbar MRI was ordered by Neurosurgery, has not yet been completed.  Degenerative changes were noted in the spine on CT abdomen and pelvis from 10/2023.    Location: lower back   Onset: 1.5 years  Current Pain Score: 4/10  Daily Pain of Range: 5-6/10  Quality: Grabbing and Sharp  Radiation: N/A  Worsened by: extension, flexion, and lifting  Improved by: rest    Patient denies night fever/night sweats, urinary incontinence, bowel incontinence, significant weight loss, significant motor weakness, and loss of sensations.    Previous Interventions:  - None    Previous Therapies:  PT/OT: yes - starts  12/26/2023   Chiropractor:   HEP:   Relevant Surgery: no   Previous Medications:   - NSAIDS:   - Muscle Relaxants:    - TCAs:   - SNRIs:   - Topicals:   - Anticonvulsants:    - Opioids:   - Adjuvants:     Current Pain Medications:       Review of Systems:  ROS    GENERAL:  No weight loss, malaise or fevers.  HEENT:   No recent changes in vision or hearing  NECK:  No difficulty with swallowing. No stridor.   RESPIRATORY:  Negative for cough, wheezing or shortness of breath, patient denies any recent URI.  CARDIOVASCULAR:  Negative for chest pain, leg swelling or palpitations.  GI:  Negative for abdominal discomfort, blood in stools or black  stools or change in bowel habits. + history of gastric bypass surgery.  MUSCULOSKELETAL:  See HPI.  SKIN:  Negative for lesions, rash, and itching.  PSYCH:  No mood disorder or recent psychosocial stressors.    HEMATOLOGY/LYMPHOLOGY:  Negative for prolonged bleeding, bruising easily or swollen nodes.  Patient is not currently taking any anti-coagulants  NEURO:   No history of headaches, syncope, paralysis, seizures or tremors.  All other reviewed and negative other than HPI.    History:  Current medications, allergies, medical history, surgical history,   family history, and social history were reviewed in the chart as marked.    Full Medication List:    Current Outpatient Medications:     acyclovir 5% (ZOVIRAX) 5 % ointment, Apply topically every 3 (three) hours, Disp: 15 g, Rfl: 0    buPROPion (WELLBUTRIN SR) 150 MG TBSR 12 hr tablet, , Disp: , Rfl:     buPROPion (WELLBUTRIN XL) 150 MG TB24 tablet, Take 1 tablet (150 mg total) by mouth 2 (two) times a day., Disp: 180 tablet, Rfl: 0    tranexamic acid (LYSTEDA) 650 mg tablet, Take 2 tablets by mouth 3 (three) times daily, Disp: 30 tablet, Rfl: 3    traZODone (DESYREL) 100 MG tablet, Take 100 mg by mouth nightly as needed., Disp: , Rfl:     traZODone (DESYREL) 100 MG tablet, Take 1 tablet (100 mg total) by mouth nightly as needed for insomnia, Disp: 30 tablet, Rfl: 0    valACYclovir (VALTREX) 1000 MG tablet, Take 1,000 mg by mouth 2 (two) times daily., Disp: , Rfl:     valACYclovir (VALTREX) 1000 MG tablet, Take 1 tablet by mouth 2 (two) times daily, Disp: 60 tablet, Rfl: 0    meloxicam (MOBIC) 15 MG tablet, Take 1 tablet (15 mg total) by mouth once daily., Disp: 30 tablet, Rfl: 0     Allergies:  Dilaudid [hydromorphone] and Meperidine     Medical History:   has no past medical history on file.    Surgical History:   has a past surgical history that includes Laparoscopic gastric banding; Laparoscopic gastric banding (2008); Cholecystectomy; Abdominal surgery; Eye  "surgery (Right); Breast surgery; Gastric bypass; lap band removal (7/9/14); Hernia repair (N/A, 2016); Diagnostic laparoscopy (N/A, 5/18/2023); Diagnostic laparoscopy (N/A, 7/5/2023); Diagnostic laparoscopy (N/A, 9/24/2023); and Laparoscopic repair of incisional hernia (9/24/2023).    Family History:  family history includes Hypertension in her father and mother; Obesity in her father.    Social History:   reports that she has never smoked. She has never used smokeless tobacco. She reports current alcohol use. She reports that she does not use drugs.    Physical Exam:  Vitals:    12/19/23 0845   BP: 92/63   Pulse: 86   Weight: 65.4 kg (144 lb 1.1 oz)   Height: 5' 3" (1.6 m)   PainSc:   4   PainLoc: Back       GENERAL: Well appearing, in no acute distress, alert and oriented x3.  PSYCH:  Mood and affect appropriate.  SKIN: Skin color, texture, turgor normal, no rashes or lesions.  HEAD/FACE:  Normocephalic, atraumatic. Cranial nerves grossly intact.  NECK: Normal ROM. Supple.   CV: RRR with palpation of the radial artery.  PULM: No evidence of respiratory difficulty, symmetric chest rise.  GI:  Soft and non-distended.  MSK:   Decreased range of motion of the lumbar spine with forward flexion secondary to pain.  Straight leg raising is negative to radicular pain. +Tenderness to palpation over the lower midline lumbar spine.  No pain with lumbar facet loading. No pain over the SI joints. Sacral Thrust is negative.  +pain over the GBT bilaterally.   Peripheral joint ROM is full and pain free without obvious instability or laxity in all four extremities. No obvious deformities, edema, or skin discoloration.  No atrophy or tone abnormalities are noted.   NEURO: Bilateral upper and lower extremity coordination and strength is symmetric.  No loss of sensation is noted.  MENTAL STATUS: A x O x 3, good concentration, speech is fluent and goal directed  MOTOR: 5/5 in all muscle groups  GAIT: Normal. Ambulates " unassisted.    Imaging:  CT ABDOMEN PELVIS WITH CONTRAST     CLINICAL HISTORY:  Abdominal pain, acute, nonlocalized;  Generalized abdominal pain     TECHNIQUE:  Low dose axial images, sagittal and coronal reformations were obtained from the lung bases to the pubic symphysis following the IV administration of 75 mL of Omnipaque 350 intravenous contrast. Oral contrast was administered.     COMPARISON:  CT abdomen pelvis 09/23/2023     FINDINGS:  Lungs: Stable 0.3 nodule in the right lower lobe.  Stable bandlike scarring versus subsegmental atelectasis of the right lower lobe.  Bibasilar dependent atelectasis     Heart: Normal size. No pericardial effusion.     Liver: Stable cyst within hepatic segment 4A.  Additional subcentimeter hypodensities, too small to characterize.     Gallbladder: Surgically absent.     Bile ducts: Stable mild intrahepatic ductal dilatation.  Continued prominence of the extrahepatic biliary duct.     Spleen: Normal size.     Pancreas: No mass or ductal dilatation.  No peripancreatic fat stranding.     Adrenals: No significant abnormality     Renal/Ureters: The kidneys enhance symmetrically.  No hydroureteronephrosis. The urinary bladder is distended with smooth wall contours.     Reproductive: Uterus is without significant abnormality. No adnexal mass.     Stomach/Bowel: Postop changes of gastric bypass.  No evidence of obstruction or inflammatory changes.  The appendix is distended without periappendiceal inflammatory changes.     Peritoneum: Improvement previous seen mesenteric edema.  Small volume fluid within the pelvis.  No intraperitoneal free air.     Lymph Nodes: No pathologic ha enlargement in the abdomen or pelvis.     Vasculature: Abdominal aorta tapers normally.  Portal vasculature, SMV, and splenic vein are patent.     Bones: Degenerative changes of the spine.  No acute fractures or osseous destructive lesions.     Soft Tissues: Partially imaged bilateral breast implants.  Mild  generalized body wall edema.     Impression:     No evidence for abnormality to explain acute abdominopelvic pain.     Postoperative changes of gastric bypass.  No obstruction.     Postoperative changes of cholecystectomy with stable mild bile duct dilatation.     Electronically signed by resident: Brian Hernandes  Date:                                            10/06/2023    Labs:  BMP  Lab Results   Component Value Date     09/25/2023    K 3.8 09/25/2023     (H) 09/25/2023    CO2 24 09/25/2023    BUN 3 (L) 09/25/2023    CREATININE 0.6 09/25/2023    CALCIUM 8.2 (L) 09/25/2023    ANIONGAP 6 (L) 09/25/2023    EGFRNORACEVR >60.0 09/25/2023     Lab Results   Component Value Date    ALT 21 09/25/2023    AST 23 09/25/2023    ALKPHOS 51 (L) 09/25/2023    BILITOT 0.4 09/25/2023     Lab Results   Component Value Date    WBC 7.61 09/25/2023    HGB 8.7 (L) 09/25/2023    HCT 29.6 (L) 09/25/2023    MCV 70 (L) 09/25/2023     09/25/2023         Assessment:  Problem List Items Addressed This Visit    None  Visit Diagnoses       DDD (degenerative disc disease), lumbar    -  Primary    Relevant Orders    Ambulatory referral/consult to [facility]    X-Ray Lumbar Spine 5 View    Lumbar radiculopathy, chronic        Dorsalgia        Relevant Orders    X-Ray Lumbar Spine 5 View          12/19/2023 - Sarah Padron is a 43 y.o. female who  has no past medical history on file.  By history and examination this patient has chronic low back pain without radiculopathy.  The underlying cause cause is degenerative disc disease.  Pathology is confirmed by imaging.  We discussed the underlying diagnoses and multiple treatment options including non-opioid medications, interventional procedures, physical therapy, home exercise, and core muscle enhancement.  The risks and benefits of each treatment option were discussed and all questions were answered.        Treatment Plan:   Procedures:  None at this time.  May  benefit from a lumbar epidural steroid injection if no improvement with physical therapy.   PT/OT/HEP: I have stressed the importance of physical activity and a home exercise plan to help with pain and improve health.  Physical therapy to start next week.  Patient requests a referral to healthy back.  Referral placed.  Medications:    - meloxicam 15 mg q.d. p.r.n.   -  Reviewed and consistent with medication use as prescribed.  Imaging:  CT abdomen and pelvis was reviewed.  MRI is pending.  I will order an x-ray of the lumbar spine today.  Follow Up: RTC 6 weeks    Melinda Sanchez DO   Interventional Pain Medicine / Anesthesiology    Disclaimer: This note was partly generated using dictation software which may occasionally result in transcription errors.

## 2024-01-09 ENCOUNTER — CLINICAL SUPPORT (OUTPATIENT)
Dept: REHABILITATION | Facility: HOSPITAL | Age: 44
End: 2024-01-09
Payer: COMMERCIAL

## 2024-01-09 DIAGNOSIS — M54.50 CHRONIC MIDLINE LOW BACK PAIN WITHOUT SCIATICA: ICD-10-CM

## 2024-01-09 DIAGNOSIS — G89.29 CHRONIC MIDLINE LOW BACK PAIN WITHOUT SCIATICA: ICD-10-CM

## 2024-01-09 DIAGNOSIS — M48.02 SPINAL STENOSIS, CERVICAL REGION: ICD-10-CM

## 2024-01-09 DIAGNOSIS — M54.16 LUMBAR RADICULOPATHY, CHRONIC: ICD-10-CM

## 2024-01-09 DIAGNOSIS — M53.86 DECREASED RANGE OF MOTION OF LUMBAR SPINE: Primary | ICD-10-CM

## 2024-01-09 PROCEDURE — 97161 PT EVAL LOW COMPLEX 20 MIN: CPT

## 2024-01-09 NOTE — PROGRESS NOTES
[facility]   Physical Therapy Initial Evaluation      Name: Sarah Padron  Clinic Number: [id number]    Therapy Diagnosis:   Encounter Diagnoses   Name Primary?    Spinal stenosis, cervical region     Lumbar radiculopathy, chronic     Decreased range of motion of lumbar spine Yes    Chronic midline low back pain without sciatica         Physician: Any Squires PA-C    Physician Orders: PT Eval and Treat   Medical Diagnosis from Referral:   M51.36 (ICD-10-CM) - DDD (degenerative disc disease), lumbar     Evaluation Date: 1/9/2024  Authorization Period Expiration: 12/11/24  Plan of Care Expiration: 3/9/24  Progress Note Due: 2/9/24  Visit # / Visits authorized: 1/ 1   FOTO: 0/5    Precautions: Standard     Time In: 4:10 pm  Time Out: 5:09  Total Billable Time: 59 minutes    Subjective     Date of onset: 1.5 Years Ago     History of current condition - Sarah reports: Feeling a growing back pain beginning approximately 1.5 years when she was staying at her aunts house and sitting at counters with lower height.  Over the past time, she noticed that she had significant pain when bending forward and can't  things like a laundry basket without having a lot of pain in the low back and having to use her arms to crawl back to standing.  She has no problems when extending backwards from neutral.  She has had a significant history of abdominal surgeries with her first being in 2007 for a lat band procedure, a revision in 2008, an intestine bypass in 2014, and a surgery to move organ placement when pregnant in 2016.  She had 3 emergency surgeries last year to correct complications.  Her back pain is very centralized around L3-L4 and was found on a CT scan for her abdomen.  Her job requires her to have a lot of lead vests on or a lot sitting.  She has tried to accupuncture and it has helped but it became too expensive.      Falls: No falls     Imaging: X-Ray:  Lumbar vertebral body  heights and alignment are preserved.  There is L4-5 disc space narrowing and degenerative endplate sclerosis and early marginal osteophyte formation.  The remainder of the disc spaces are preserved.  There may be minimal lower lumbar facet arthropathy.  Left upper quadrant surgical suture line noted and cholecystectomy clips present.        Prior Therapy: Yes for Hips   Social History: 12-15 Stairs in 2 Story Home with Both Kids (11 and 7)   Occupation: X-Ray Tech  Prior Level of Function: Independent   Current Level of Function: Limited by pain     Pain:  Current 2/10, worst 7/10, best 1/10   Location:  Middle Low Back   Description: Tight, Deep, and Hot  Aggravating Factors: Bending and Picking Things Up, Prolonged Standing   Easing Factors: pain medication    Patients goals: Be able to  laundry basket without pain     Medical History:   No past medical history on file.    Surgical History:   Sarah Padron  has a past surgical history that includes Laparoscopic gastric banding; Laparoscopic gastric banding (2008); Cholecystectomy; Abdominal surgery; Eye surgery (Right); Breast surgery; Gastric bypass; lap band removal (7/9/14); Hernia repair (N/A, 2016); Diagnostic laparoscopy (N/A, 5/18/2023); Diagnostic laparoscopy (N/A, 7/5/2023); Diagnostic laparoscopy (N/A, 9/24/2023); and Laparoscopic repair of incisional hernia (9/24/2023).    Medications:   Sarah has a current medication list which includes the following prescription(s): acyclovir 5%, bupropion, bupropion, meloxicam, tranexamic acid, trazodone, trazodone, valacyclovir, and valacyclovir.    Allergies:   Review of patient's allergies indicates:   Allergen Reactions    Dilaudid [hydromorphone] Itching    Meperidine Nausea And Vomiting and Itching     Other reaction(s): Other (See Comments)        Objective        Posture: Patient has significant lumbar lordosis throughout the entire lumbar spine.     Lumbar AROM: Pain/Dysfunction with  Movement: !=pain   Flexion: 15 degrees Pain    Extension: 26 degrees    Right side bendin degrees Pain    Left side bendin degrees    Right rotation: 10% limited    Left rotation: 10% limited    Motion primarily occurs through thoracic spine        Right ROM Right MMT Left ROM Left MMT Note:   Hip Flexion:  (120 Degrees) 60 4-/5 60 4-/5    Hip ABD:  (45 Degrees)   3+/5  4-/5    Hip ADD:  (30 Degrees)         Hip Extension:  (20 Degrees)  20 4-/5 20 4-/5    Hip IR:  (35 Degrees)  30 4-/5 40 4-/5    Hip ER:  (45 Degrees)  55 4/5 55 4-/5        Neural Tension Positive/Negative   Passive SLR w/ DF  Negative All Biases      Prone hip ext motor control:Hamstring Dominant     Palpation: Tenderness to L2-L4    Joint Mobility: Hypomobility through L4-L5     Test Right LE:  Left LE:    DANILO LATIF     Hamstring 90/90 136 138   Prone Knee Bend (Femoral Nerve)  Negative Negative   Overhead Squat  Increased knee flexion and decreased lumbar motion     SLS Balance Eyes Open No Hip Drop Good Balance No Hip Drop Good Balance    SLS Balance Eyes Closed       Sensation:   Lower Extremity Dermatomes  Dermatome        Right              Left            T12  Normal  NT NT    L1 Normal Normal    L2 Normal Normal    L3 Normal Normal    L4 Normal Normal    L5 Normal Normal    S1 Normal Normal    S2 Normal Normal    S3 NT NT            Lower Extremity Myotomes:   Myotomes        Right                 Left   L2  Hip Flexion  4-/5 4-/5   L3  Knee Extension 4+/5 5/5   L4  Dorsiflexion 4+/5 5/5   L5  Toe Extension 4+/5 5/5   S1  Plantarflexion 5/5 5/5   S2  Knee Flexion 4/5 4+/5        Reflexes:  Reflexes Positive Right  Positive Left Comments   Patella 2+ 2+    Achilles 2+ 2+      Gait: Normal step length with normal dorsiflexion and plantarflexion.  Patient has significant right pelvic drop with left stance phase.  There is increased shearing through the thoracic spine.           Intake Outcome Measure for FOTO Back  Survey    Therapist reviewed FOTO scores for Sarah Padron on 1/9/2024.   FOTO documents entered into [employer] - see Media section.    Intake Score: 63%         Treatment     Total Treatment time (time-based codes) separate from Evaluation: 0 minutes     Sarah received the treatments listed below:      Therapeutic Exercises to develop  for 0 minutes including:  Exercise Sets Reps Time Status                                    Manual Therapy Techniques: The techniques below were applied for 0 minutes:  Technique Location  Status                             Neuromuscular Re-Education activities to improve:  for 0 minutes. The following activities were included:  Exercise Sets Reps Time Status                                    Therapeutic Activities to improve functional performance for 0  minutes, including:  Exercise Sets Reps Time Status                                    gait training to improve functional mobility and safety for 0  minutes, including:      direct contact modalities after being cleared for contraindications:     supervised modalities after being cleared for contradictions:     hot pack for 0 minutes to .    cold pack for 0 minutes to .    Patient Education and Home Exercises     Education provided:   - Plan of Care  - HEP   - Residency Requirements   - Anatomy and Physiology     Written Home Exercises Provided: yes. Exercises were reviewed and Sarah was able to demonstrate them prior to the end of the session.  Sarah demonstrated good  understanding of the education provided. See EMR under Patient Instructions for exercises provided during therapy sessions.    Assessment     Sarah is a 43 y.o. female referred to outpatient Physical Therapy with a medical diagnosis of   M51.36 (ICD-10-CM) - DDD (degenerative disc disease), lumbar   . Patient presents with chronic back pain from an insidious onset and imaging revealing degenerative disc disease.  She has decreased core activation  potentially stemming from multiple abdominal surgeries affecting the musculature.  Patient has significant mobility through the thoracic spine and hips and has majority of movement coming from the TL junction. Patient has prominent hypomobility in the L4-L5 joint where almost all of her pain is located.  She continues to have tenderness throughout the lumbar spine.  She demonstrates aberrant motions when rising from the flexed position without proper musculature control.  Patient will benefit from stability interventions to improve control of individual segment movement while improving mobility of the restricted segment.  Movement coordination and motor planning will be instrumental in decreasing patient's symptoms.      Patient prognosis is Good.   Patient will benefit from skilled outpatient Physical Therapy to address the deficits stated above and in the chart below, provide patient /family education, and to maximize patientt's level of independence.     Plan of care discussed with patient: Yes  Patient's spiritual, cultural and educational needs considered and patient is agreeable to the plan of care and goals as stated below:     Anticipated Barriers for therapy: Work Schedule     Medical Necessity is demonstrated by the following  History  Co-morbidities and personal factors that may impact the plan of care [] LOW: no personal factors / co-morbidities  [x] MODERATE: 1-2 personal factors / co-morbidities  [] HIGH: 3+ personal factors / co-morbidities    Moderate / High Support Documentation:   Co-morbidities affecting plan of care: Abdominal Surgeries     Personal Factors:        Examination  Body Structures and Functions, activity limitations and participation restrictions that may impact the plan of care [x] LOW: addressing 1-2 elements  [] MODERATE: 3+ elements  [] HIGH: 4+ elements (please support below)    Moderate / High Support Documentation:      Clinical Presentation [x] LOW: stable  [] MODERATE:  Evolving  [] HIGH: Unstable     Decision Making/ Complexity Score: low       Goals:  Short Term Goals (4 Weeks):  1. Pt will be compliant with HEP to supplement PT in decreasing pain with functional mobility.  2. Pt will perform pallof press with good control to demonstrate improved core strength.  3. Pt will improve lumbar flexion ROM by 10 degrees to improve functional mobility.  4. Pt will improve impaired LE MMTs by 1/2 score to improve strength for functional tasks.  Long Term Goals (8 Weeks):   1. Pt will improve FOTO score to </= 68% limited to decrease perceived limitation with maintaining/changing body position.   2. Pt will perform floor to waist lift with good control to demonstrate improved core strength.  3. Pt will improve impaired LE MMTs by 1 score to improve strength for functional tasks.  4. Pt will report no pain during lumbar ROM to promote functional mobility.    Plan     Plan of care Certification: 1/9/2024 to 3/9/24.    Outpatient Physical Therapy 2 times weekly for 8 weeks to include the following interventions: Cervical/Lumbar Traction, Electrical Stimulation  , Gait Training, Manual Therapy, Moist Heat/ Ice, Neuromuscular Re-ed, Patient Education, Self Care, Therapeutic Activities, Therapeutic Exercise, and Ultrasound.     Robbie Donahue, PT

## 2024-01-12 NOTE — PLAN OF CARE
[facility]   Physical Therapy Initial Evaluation      Name: Sarah Padron  Clinic Number: [id number]    Therapy Diagnosis:   Encounter Diagnoses   Name Primary?    Spinal stenosis, cervical region     Lumbar radiculopathy, chronic     Decreased range of motion of lumbar spine Yes    Chronic midline low back pain without sciatica         Physician: Any Squires PA-C    Physician Orders: PT Eval and Treat   Medical Diagnosis from Referral:   M51.36 (ICD-10-CM) - DDD (degenerative disc disease), lumbar     Evaluation Date: 1/9/2024  Authorization Period Expiration: 12/11/24  Plan of Care Expiration: 3/9/24  Progress Note Due: 2/9/24  Visit # / Visits authorized: 1/ 1   FOTO: 0/5    Precautions: Standard     Time In: 4:10 pm  Time Out: 5:09  Total Billable Time: 59 minutes    Subjective     Date of onset: 1.5 Years Ago     History of current condition - Sarah reports: Feeling a growing back pain beginning approximately 1.5 years when she was staying at her aunts house and sitting at counters with lower height.  Over the past time, she noticed that she had significant pain when bending forward and can't  things like a laundry basket without having a lot of pain in the low back and having to use her arms to crawl back to standing.  She has no problems when extending backwards from neutral.  She has had a significant history of abdominal surgeries with her first being in 2007 for a lat band procedure, a revision in 2008, an intestine bypass in 2014, and a surgery to move organ placement when pregnant in 2016.  She had 3 emergency surgeries last year to correct complications.  Her back pain is very centralized around L3-L4 and was found on a CT scan for her abdomen.  Her job requires her to have a lot of lead vests on or a lot sitting.  She has tried to accupuncture and it has helped but it became too expensive.      Falls: No falls     Imaging: X-Ray:  Lumbar vertebral body  heights and alignment are preserved.  There is L4-5 disc space narrowing and degenerative endplate sclerosis and early marginal osteophyte formation.  The remainder of the disc spaces are preserved.  There may be minimal lower lumbar facet arthropathy.  Left upper quadrant surgical suture line noted and cholecystectomy clips present.        Prior Therapy: Yes for Hips   Social History: 12-15 Stairs in 2 Story Home with Both Kids (11 and 7)   Occupation: X-Ray Tech  Prior Level of Function: Independent   Current Level of Function: Limited by pain     Pain:  Current 2/10, worst 7/10, best 1/10   Location:  Middle Low Back   Description: Tight, Deep, and Hot  Aggravating Factors: Bending and Picking Things Up, Prolonged Standing   Easing Factors: pain medication    Patients goals: Be able to  laundry basket without pain     Medical History:   No past medical history on file.    Surgical History:   Sarah Padron  has a past surgical history that includes Laparoscopic gastric banding; Laparoscopic gastric banding (2008); Cholecystectomy; Abdominal surgery; Eye surgery (Right); Breast surgery; Gastric bypass; lap band removal (7/9/14); Hernia repair (N/A, 2016); Diagnostic laparoscopy (N/A, 5/18/2023); Diagnostic laparoscopy (N/A, 7/5/2023); Diagnostic laparoscopy (N/A, 9/24/2023); and Laparoscopic repair of incisional hernia (9/24/2023).    Medications:   Sarah has a current medication list which includes the following prescription(s): acyclovir 5%, bupropion, bupropion, meloxicam, tranexamic acid, trazodone, trazodone, valacyclovir, and valacyclovir.    Allergies:   Review of patient's allergies indicates:   Allergen Reactions    Dilaudid [hydromorphone] Itching    Meperidine Nausea And Vomiting and Itching     Other reaction(s): Other (See Comments)        Objective        Posture: Patient has significant lumbar lordosis throughout the entire lumbar spine.     Lumbar AROM: Pain/Dysfunction with  Movement: !=pain   Flexion: 15 degrees Pain    Extension: 26 degrees    Right side bendin degrees Pain    Left side bendin degrees    Right rotation: 10% limited    Left rotation: 10% limited    Motion primarily occurs through thoracic spine        Right ROM Right MMT Left ROM Left MMT Note:   Hip Flexion:  (120 Degrees) 60 4-/5 60 4-/5    Hip ABD:  (45 Degrees)   3+/5  4-/5    Hip ADD:  (30 Degrees)         Hip Extension:  (20 Degrees)  20 4-/5 20 4-/5    Hip IR:  (35 Degrees)  30 4-/5 40 4-/5    Hip ER:  (45 Degrees)  55 4/5 55 4-/5        Neural Tension Positive/Negative   Passive SLR w/ DF  Negative All Biases      Prone hip ext motor control:Hamstring Dominant     Palpation: Tenderness to L2-L4    Joint Mobility: Hypomobility through L4-L5     Test Right LE:  Left LE:    DANILO LATIF     Hamstring 90/90 136 138   Prone Knee Bend (Femoral Nerve)  Negative Negative   Overhead Squat  Increased knee flexion and decreased lumbar motion     SLS Balance Eyes Open No Hip Drop Good Balance No Hip Drop Good Balance    SLS Balance Eyes Closed       Sensation:   Lower Extremity Dermatomes  Dermatome        Right              Left            T12  Normal  NT NT    L1 Normal Normal    L2 Normal Normal    L3 Normal Normal    L4 Normal Normal    L5 Normal Normal    S1 Normal Normal    S2 Normal Normal    S3 NT NT            Lower Extremity Myotomes:   Myotomes        Right                 Left   L2  Hip Flexion  4-/5 4-/5   L3  Knee Extension 4+/5 5/5   L4  Dorsiflexion 4+/5 5/5   L5  Toe Extension 4+/5 5/5   S1  Plantarflexion 5/5 5/5   S2  Knee Flexion 4/5 4+/5        Reflexes:  Reflexes Positive Right  Positive Left Comments   Patella 2+ 2+    Achilles 2+ 2+      Gait: Normal step length with normal dorsiflexion and plantarflexion.  Patient has significant right pelvic drop with left stance phase.  There is increased shearing through the thoracic spine.           Intake Outcome Measure for FOTO Back  Survey    Therapist reviewed FOTO scores for Sarah Padron on 1/9/2024.   FOTO documents entered into [employer] - see Media section.    Intake Score: 63%         Treatment     Total Treatment time (time-based codes) separate from Evaluation: 0 minutes     Sarah received the treatments listed below:      Therapeutic Exercises to develop  for 0 minutes including:  Exercise Sets Reps Time Status                                    Manual Therapy Techniques: The techniques below were applied for 0 minutes:  Technique Location  Status                             Neuromuscular Re-Education activities to improve:  for 0 minutes. The following activities were included:  Exercise Sets Reps Time Status                                    Therapeutic Activities to improve functional performance for 0  minutes, including:  Exercise Sets Reps Time Status                                    gait training to improve functional mobility and safety for 0  minutes, including:      direct contact modalities after being cleared for contraindications:     supervised modalities after being cleared for contradictions:     hot pack for 0 minutes to .    cold pack for 0 minutes to .    Patient Education and Home Exercises     Education provided:   - Plan of Care  - HEP   - Residency Requirements   - Anatomy and Physiology     Written Home Exercises Provided: yes. Exercises were reviewed and Sarah was able to demonstrate them prior to the end of the session.  Sarah demonstrated good  understanding of the education provided. See EMR under Patient Instructions for exercises provided during therapy sessions.    Assessment     Sarah is a 43 y.o. female referred to outpatient Physical Therapy with a medical diagnosis of   M51.36 (ICD-10-CM) - DDD (degenerative disc disease), lumbar   . Patient presents with chronic back pain from an insidious onset and imaging revealing degenerative disc disease.  She has decreased core activation  potentially stemming from multiple abdominal surgeries affecting the musculature.  Patient has significant mobility through the thoracic spine and hips and has majority of movement coming from the TL junction. Patient has prominent hypomobility in the L4-L5 joint where almost all of her pain is located.  She continues to have tenderness throughout the lumbar spine.  She demonstrates aberrant motions when rising from the flexed position without proper musculature control.  Patient will benefit from stability interventions to improve control of individual segment movement while improving mobility of the restricted segment.  Movement coordination and motor planning will be instrumental in decreasing patient's symptoms.      Patient prognosis is Good.   Patient will benefit from skilled outpatient Physical Therapy to address the deficits stated above and in the chart below, provide patient /family education, and to maximize patientt's level of independence.     Plan of care discussed with patient: Yes  Patient's spiritual, cultural and educational needs considered and patient is agreeable to the plan of care and goals as stated below:     Anticipated Barriers for therapy: Work Schedule     Medical Necessity is demonstrated by the following  History  Co-morbidities and personal factors that may impact the plan of care [] LOW: no personal factors / co-morbidities  [x] MODERATE: 1-2 personal factors / co-morbidities  [] HIGH: 3+ personal factors / co-morbidities    Moderate / High Support Documentation:   Co-morbidities affecting plan of care: Abdominal Surgeries     Personal Factors:        Examination  Body Structures and Functions, activity limitations and participation restrictions that may impact the plan of care [x] LOW: addressing 1-2 elements  [] MODERATE: 3+ elements  [] HIGH: 4+ elements (please support below)    Moderate / High Support Documentation:      Clinical Presentation [x] LOW: stable  [] MODERATE:  Evolving  [] HIGH: Unstable     Decision Making/ Complexity Score: low       Goals:  Short Term Goals (4 Weeks):  1. Pt will be compliant with HEP to supplement PT in decreasing pain with functional mobility.  2. Pt will perform pallof press with good control to demonstrate improved core strength.  3. Pt will improve lumbar flexion ROM by 10 degrees to improve functional mobility.  4. Pt will improve impaired LE MMTs by 1/2 score to improve strength for functional tasks.  Long Term Goals (8 Weeks):   1. Pt will improve FOTO score to </= 68% limited to decrease perceived limitation with maintaining/changing body position.   2. Pt will perform floor to waist lift with good control to demonstrate improved core strength.  3. Pt will improve impaired LE MMTs by 1 score to improve strength for functional tasks.  4. Pt will report no pain during lumbar ROM to promote functional mobility.    Plan     Plan of care Certification: 1/9/2024 to 3/9/24.    Outpatient Physical Therapy 2 times weekly for 8 weeks to include the following interventions: Cervical/Lumbar Traction, Electrical Stimulation , Gait Training, Manual Therapy, Moist Heat/ Ice, Neuromuscular Re-ed, Patient Education, Self Care, Therapeutic Activities, Therapeutic Exercise, and Ultrasound.     Robbie Donahue, PT

## 2024-01-23 ENCOUNTER — CLINICAL SUPPORT (OUTPATIENT)
Dept: REHABILITATION | Facility: HOSPITAL | Age: 44
End: 2024-01-23
Payer: COMMERCIAL

## 2024-01-23 DIAGNOSIS — M54.50 CHRONIC MIDLINE LOW BACK PAIN WITHOUT SCIATICA: ICD-10-CM

## 2024-01-23 DIAGNOSIS — G89.29 CHRONIC MIDLINE LOW BACK PAIN WITHOUT SCIATICA: ICD-10-CM

## 2024-01-23 DIAGNOSIS — M53.86 DECREASED RANGE OF MOTION OF LUMBAR SPINE: Primary | ICD-10-CM

## 2024-01-23 PROCEDURE — 97140 MANUAL THERAPY 1/> REGIONS: CPT

## 2024-01-23 PROCEDURE — 97112 NEUROMUSCULAR REEDUCATION: CPT

## 2024-01-23 PROCEDURE — 97110 THERAPEUTIC EXERCISES: CPT

## 2024-01-23 NOTE — PROGRESS NOTES
[facility]   Physical Therapy Treatment Note      Name: Sarah Padron  Clinic Number: [id number]    Therapy Diagnosis:   Encounter Diagnoses   Name Primary?    Decreased range of motion of lumbar spine Yes    Chronic midline low back pain without sciatica      Physician: Any Squires PA-C    Visit Date: 1/23/2024    Evaluation Date: 1/9/2024  Authorization Period Expiration: 12/31/24  Plan of Care Expiration: 3/9/24  Progress Note Due: 2/9/24  Visit # / Visits authorized: 1/20   FOTO: 0/5     Precautions: Standard      Time In: 3:01 PM  Time Out: 4:01 PM  Total Billable Time: 60 minutes    Subjective     Patient reports: she has been having the same amount of back pain since initial evaluation and just tries to not bend forward too much or lean over because she knows her back will hurt.    She was compliant with home exercise program.    Response to previous treatment: first follow up  Functional change: first follow up     Pain: 4/10  Location: bilateral back      Objective      Objective Measures updated at progress report unless specified.     Mobility assessment:  Hip:    Decreased posterior glide on Right   Decreased IR on right  + FADIR prior to manual therapy, improved symptoms following manual therapy   - Olive Bilaterally       Lumbar:     Passive sidelying mobility assessment of intervertebral spaces: hypomobile L4/L5   Pain with extension + right rotation active range of motion     Treatment     Sarah received the treatments listed below:      therapeutic exercises to develop strength, endurance, ROM, and flexibility for 25 minutes including:    Assessment above - Lumbar mobility  Education - HEP / activity modification     manual therapy techniques: Joint mobilizations and Soft tissue Mobilization were applied to the: lumbar spine for 09 minutes, including:    Lumbar gapping L4-L5 grade II-IV  Right Hip long axis distraction       neuromuscular re-education  "activities to improve: Balance, Coordination, Kinesthetic, Proprioception, and Posture for 26 minutes. The following activities were included:    Pelvic tilt + double leg bridge 2 x 15   TA bracing with biofeedback cuff 60-68 mmHg   TA bracing with bent knee fallouts at 60-68 mmHg, 15x each side, cueing for core bracing   Standing Paloff press with staggered stance using 5# cable column, 2 x 10 with 3" hold into press   Standing Paloff press with Green Tband in staggered stance, 10x each direction       therapeutic activities to improve functional performance for 00  minutes, including:    Next visit:  Hip hinging from 24 inch box with dowel   Double leg squatting on shuttle       Patient Education and Home Exercises       Education provided:   - HEP       Written Home Exercises Provided: Patient instructed to cont prior HEP. Exercises were reviewed and Sarah was able to demonstrate them prior to the end of the session.  Sarah demonstrated good  understanding of the education provided. See Electronic Medical Record under Patient Instructions for exercises provided during therapy sessions    Assessment     Sarah presents for her first follow-up visit today and has reports of continued fear of flexing forward due to pain and inability to return to upright position. She demonstrates poor flexion ability in standing from her lumbar spine and has noted midline/right sided low back pain with extension/right rotation motion. She also had some decreased Right hip mobility and these were both addressed with manual therapy. Upon retesting initial quadrant motion assessment, patient stated pain was not as severe or present following manual interventions. Session then focused on improving core stabilization and glute strengthening. Patient will continue to progress as tolerated and was issued HEP handout.     Sarah Is progressing well towards her goals.   Patient prognosis is Good.     Patient will continue to " benefit from skilled outpatient physical therapy to address the deficits listed in the problem list box on initial evaluation, provide pt/family education and to maximize pt's level of independence in the home and community environment.     Patient's spiritual, cultural and educational needs considered and pt agreeable to plan of care and goals.     Anticipated barriers to physical therapy: work schedule     Goals:   Short Term Goals (4 Weeks):  1. Pt will be compliant with HEP to supplement PT in decreasing pain with functional mobility.  2. Pt will perform pallof press with good control to demonstrate improved core strength.  3. Pt will improve lumbar flexion ROM by 10 degrees to improve functional mobility.  4. Pt will improve impaired LE MMTs by 1/2 score to improve strength for functional tasks.    Long Term Goals (8 Weeks):   1. Pt will improve FOTO score to </= 68% limited to decrease perceived limitation with maintaining/changing body position.   2. Pt will perform floor to waist lift with good control to demonstrate improved core strength.  3. Pt will improve impaired LE MMTs by 1 score to improve strength for functional tasks.  4. Pt will report no pain during lumbar ROM to promote functional mobility.      Plan     Plan of care Certification: 1/9/2024 to 3/9/24.     Outpatient Physical Therapy 2 times weekly for 8 weeks to include the following interventions: Cervical/Lumbar Traction, Electrical Stimulation , Gait Training, Manual Therapy, Moist Heat/ Ice, Neuromuscular Re-ed, Patient Education, Self Care, Therapeutic Activities, Therapeutic Exercise, and Ultrasound.     Richard Marquez, PT

## 2024-02-01 NOTE — PROGRESS NOTES
Subjective:     Patient ID: Sarah Padron is a 43 y.o. female.    Chief Complaint: No chief complaint on file.    Ms Padron is a 44 yo female here for evaluation for the healthy back lumbar program.  she has had low back pain for 2 years with gradual onset.  The pain is midline low back dominant and goes to the left and right side.  The pain is achy and sharp with bending.  There is no tingling, numbness, burning or weakness.  The pain is intermittent 0-6/10.  It is worse with bending, standing, sitting, wearing lead, lifting, and morning.  It is better walking, lying on back and stomach, climbing stairs, afternoon, evening, and night. She has been doing PT at [facility], and it has helped. She has not had other treatment.  Her goals are bending, standing, and lifting laundry.  Pattern 1      X-ray lumbar 12/2023  Lumbar vertebral body heights and alignment are preserved.  There is L4-5 disc space narrowing and degenerative endplate sclerosis and early marginal osteophyte formation.  The remainder of the disc spaces are preserved.  There may be minimal lower lumbar facet arthropathy.  Left upper quadrant surgical suture line noted and cholecystectomy clips present.     Impression:     L4-5 spondylosis      No past medical history on file.    Past Surgical History:  No date: ABDOMINAL SURGERY  No date: BREAST SURGERY      Comment:  lift  No date: CHOLECYSTECTOMY  5/18/2023: DIAGNOSTIC LAPAROSCOPY; N/A      Comment:  Procedure: LAPAROSCOPY, DIAGNOSTIC;  Surgeon: Davy Barnes MD;  Location: [address];  Service:                General;  Laterality: N/A;  REPAIR INTERNAL HERNIA  7/5/2023: DIAGNOSTIC LAPAROSCOPY; N/A      Comment:  Procedure: LAPAROSCOPY, DIAGNOSTIC with revision of JJ                anastomosis;  Surgeon: Davy Barnes MD;                 Location: [facility] OR [address] [place] [address];  Service: General;                 Laterality: N/A;  9/24/2023: DIAGNOSTIC LAPAROSCOPY;  N/A      Comment:  Procedure: LAPAROSCOPY, DIAGNOSTIC;  Surgeon:                Adal Pineda Jr., MD;  Location: [facility] OR [address] [place] [address];               Service: General;  Laterality: N/A;  No date: EYE SURGERY; Right      Comment:  cataract  No date: GASTRIC BYPASS  2016: HERNIA REPAIR; N/A      Comment:  internal  7/9/14: lap band removal  No date: LAPAROSCOPIC GASTRIC BANDING  2008: LAPAROSCOPIC GASTRIC BANDING      Comment:  revision  9/24/2023: LAPAROSCOPIC REPAIR OF INCISIONAL HERNIA      Comment:  Procedure: REPAIR, HERNIA, INCISIONAL, LAPAROSCOPIC;                 Surgeon: Adal Pineda Jr., MD;  Location: [facility] OR               [address] [place] [address];  Service: General;;    Review of patient's family history indicates:  Problem: Hypertension      Relation: Mother          Age of Onset: (Not Specified)  Problem: Hypertension      Relation: Father          Age of Onset: (Not Specified)  Problem: Obesity      Relation: Father          Age of Onset: (Not Specified)      Social History    Socioeconomic History      Marital status:     Tobacco Use      Smoking status: Never      Smokeless tobacco: Never    Substance and Sexual Activity      Alcohol use: Yes        Comment: ocassional; wine      Drug use: No      Sexual activity: Yes        Partners: Male    Social Determinants of Health  Financial Resource Strain: Low Risk  (7/5/2023)      Overall Financial Resource Strain (CARDIA)          Difficulty of Paying Living Expenses: Not very hard  Food Insecurity: No Food Insecurity (7/5/2023)      Hunger Vital Sign          Worried About Running Out of Food in the Last Year: Never true          Ran Out of Food in the Last Year: Never true  Transportation Needs: No Transportation Needs (7/5/2023)      PRAPARE - Transportation          Lack of Transportation (Medical): No          Lack of Transportation (Non-Medical): No  Physical Activity: Unknown (9/24/2023)      Exercise Vital Sign          Days of Exercise per Week: 4  days  Social Connections: Moderately Isolated (9/24/2023)      Social Connection and Isolation Panel [NHANES]          Frequency of Communication with Friends and Family: More than three times a week          Frequency of Social Gatherings with Friends and Family: More than three times a week          Attends [employer] Services: More than 4 times per year          Active Member of Clubs or Organizations: No          Attends Club or Organization Meetings: Never          Marital Status:   Housing Stability: Unknown (7/5/2023)      Housing Stability Vital Sign          Unable to Pay for Housing in the Last Year: No          Unstable Housing in the Last Year: No    Current Outpatient Medications:  acyclovir 5% (ZOVIRAX) 5 % ointment, Apply topically every 3 (three) hours, Disp: 15 g, Rfl: 0  buPROPion (WELLBUTRIN SR) 150 MG TBSR 12 hr tablet, , Disp: , Rfl:   buPROPion (WELLBUTRIN XL) 150 MG TB24 tablet, Take 1 tablet (150 mg total) by mouth 2 (two) times a day., Disp: 180 tablet, Rfl: 0  tranexamic acid (LYSTEDA) 650 mg tablet, Take 2 tablets by mouth 3 (three) times daily, Disp: 30 tablet, Rfl: 3  traZODone (DESYREL) 100 MG tablet, Take 100 mg by mouth nightly as needed., Disp: , Rfl:   traZODone (DESYREL) 100 MG tablet, Take 1 tablet (100 mg total) by mouth nightly as needed for insomnia, Disp: 30 tablet, Rfl: 0  traZODone (DESYREL) 100 MG tablet, 1/2 - 1 tablet by mouth at bedtime as needed for 90 days, Disp: 90 tablet, Rfl: 1  valACYclovir (VALTREX) 1000 MG tablet, Take 1,000 mg by mouth 2 (two) times daily., Disp: , Rfl:   valACYclovir (VALTREX) 1000 MG tablet, Take 1 tablet by mouth 2 (two) times daily, Disp: 60 tablet, Rfl: 0    No current facility-administered medications for this visit.      Review of patient's allergies indicates:   -- Dilaudid [hydromorphone] -- Itching   -- Meperidine -- Nausea And Vomiting and Itching    --  Other reaction(s): Other (See Comments)          Review of Systems    Constitutional: Negative for weight gain and weight loss.   Cardiovascular:  Negative for chest pain.   Respiratory:  Negative for shortness of breath.    Musculoskeletal:  Positive for back pain. Negative for joint pain and joint swelling.   Gastrointestinal:  Negative for abdominal pain, bowel incontinence, nausea and vomiting.   Genitourinary:  Negative for bladder incontinence.   Neurological:  Negative for numbness and paresthesias.        Objective:     General: Sarah is well-developed, well-nourished, appears stated age, in no acute distress, alert and oriented to time, place and person.     General    Vitals reviewed.  Constitutional: She is oriented to person, place, and time. She appears well-developed and well-nourished.   HENT:   Head: Normocephalic and atraumatic.   Pulmonary/Chest: Effort normal.   Neurological: She is alert and oriented to person, place, and time.   Psychiatric: She has a normal mood and affect. Her behavior is normal. Judgment and thought content normal.     General Musculoskeletal Exam   Gait: normal     Right Ankle/Foot Exam     Tests   Heel Walk: able to perform  Tiptoe Walk: able to perform    Left Ankle/Foot Exam     Tests   Heel Walk: able to perform  Tiptoe Walk: able to perform  Back (L-Spine & T-Spine) / Neck (C-Spine) Exam     Back (L-Spine & T-Spine) Range of Motion   Extension:  40   Flexion:  70 (with pain)   Lateral bend right:  20   Lateral bend left:  20   Rotation right:  40   Rotation left:  40     Spinal Sensation   Right Side Sensation  C-Spine Level: normal   L-Spine Level: normal  S-Spine Level: normal  Left Side Sensation  C-Spine Level: normal  L-Spine Level: normal  S-Spine Level: normal    Back (L-Spine & T-Spine) Tests   Right Side Tests  Straight leg raise:        Sitting SLR: > 70 degrees    Left Side Tests  Straight leg raise:       Sitting SLR: > 70 degrees      Other   She has no scoliosis .  Spinal Kyphosis:  Absent    Comments:  Neg  DANILO      Muscle Strength   Right Upper Extremity   Biceps: 5/5   Deltoid:  5/5  Triceps:  5/5  Wrist extension: 5/5   Finger Flexors:  5/5  Left Upper Extremity  Biceps: 5/5   Deltoid:  5/5  Triceps:  5/5  Wrist extension: 5/5   Finger Flexors:  5/5  Right Lower Extremity   Hip Flexion: 5/5   Quadriceps:  5/5   Anterior tibial:  5/5   EHL:  5/5  Left Lower Extremity   Hip Flexion: 5/5   Quadriceps:  5/5   Anterior tibial:  5/5   EHL:  5/5    Reflexes     Left Side  Biceps:  2+  Triceps:  2+  Brachioradialis:  2+  Achilles:  2+  Left Moran's Sign:  Absent  Babinski Sign:  absent  Quadriceps:  2+    Right Side   Biceps:  2+  Triceps:  2+  Brachioradialis:  2+  Achilles:  2+  Right Moran's Sign:  absent  Babinski Sign:  absent  Quadriceps:  2+    Vascular Exam     Right Pulses        Carotid:                  2+    Left Pulses        Carotid:                  2+          Assessment:     1. Chronic midline low back pain without sciatica         Plan:     Orders Placed This Encounter    Ambulatory referral/consult to [facility]     The patient has had a history of low back pain with limitations in there activities of Daily living    Previous treatment has not provided relief.    The situation was discussed at length with the patient.  We discussed different causes of back pain and different treatment options.  We discussed the importance of stretching and strengthening.  We discussed posture.  We discussed the pros and cons of further diagnostic testing, alternative treatment and Medications    Based on the history, physical exam, and functional index, an active physical therapy program is recommended.  The goal is to restore the patients function and reduce pain.  A program of progressive resistance exercises, biomechanical, and mobility maneuvers, instructions in proper body mechanics, aerobic conditioning and HEP will be utilized. The program will continue as long as making improvements.    An  assessment of patients progress will be made at each visit to document change in status.    The patient will be actively involved in their own treatment, and responsible for appointments and home program    The patient's lumbar isometric strength will be tested and they will be placed in a program of isolated strength training based on 50% of their total functional torque and advanced as clinically appropriate.      Directional preference of pain will further influence the patients active rehabilitation program    The patient was instructed there might be an initial increase in discomfort    They are enrolled with good prognosis  Pattern 1      Follow-up: No follow-ups on file. If there are any questions prior to this, the patient was instructed to contact the office.

## 2024-02-02 ENCOUNTER — CLINICAL SUPPORT (OUTPATIENT)
Dept: REHABILITATION | Facility: OTHER | Age: 44
End: 2024-02-02
Attending: PHYSICAL MEDICINE & REHABILITATION
Payer: COMMERCIAL

## 2024-02-02 ENCOUNTER — CLINICAL SUPPORT (OUTPATIENT)
Dept: REHABILITATION | Facility: OTHER | Age: 44
End: 2024-02-02
Payer: COMMERCIAL

## 2024-02-02 VITALS
HEIGHT: 63 IN | HEART RATE: 66 BPM | WEIGHT: 143.5 LBS | DIASTOLIC BLOOD PRESSURE: 65 MMHG | SYSTOLIC BLOOD PRESSURE: 100 MMHG | BODY MASS INDEX: 25.43 KG/M2

## 2024-02-02 DIAGNOSIS — M54.50 CHRONIC MIDLINE LOW BACK PAIN WITHOUT SCIATICA: Primary | ICD-10-CM

## 2024-02-02 DIAGNOSIS — G89.29 CHRONIC MIDLINE LOW BACK PAIN WITHOUT SCIATICA: Primary | ICD-10-CM

## 2024-02-02 DIAGNOSIS — Z76.89 SEEN BY HEALTH AND WELLBEING COACH: Primary | ICD-10-CM

## 2024-02-02 PROCEDURE — 97750 PHYSICAL PERFORMANCE TEST: CPT | Mod: 32 | Performed by: PHYSICAL MEDICINE & REHABILITATION

## 2024-02-02 RX ORDER — SEMAGLUTIDE 1.34 MG/ML
0.5 INJECTION, SOLUTION SUBCUTANEOUS
COMMUNITY
Start: 2023-08-02 | End: 2024-03-20

## 2024-02-02 NOTE — PROGRESS NOTES
Health  met with patient to complete initial outcomes for the Healthy Back lumbar program.  Questions were reviewed with patient and discussed with Dr. Mobley. The patient will meet with Dr. Mobley to determine program enrollment.     Any changes to patient answers are below in red font.        2/2/2024     6:44 AM   HealthyBack Questionnaire    Location of your worst pain: Lower Back   Please select the location of any additional pain: (hold down the control key, and click to select multiple responses) Low back    Did the pain begin after an event, injury, or accident? No   If yes, please describe:  .   How long has this pain been going on?  Between 1-2yrs   Please indicate how the pain is changing.  Worsening   What is the WORST level of pain that you have experienced in the last week?  6   What is the LEAST level of pain that you have experienced in the past week?  0   If you have any comments about your pain level, please provide below:  .   What can you NOT do not that you used to be able to do?  Lift anything with two arms, one hand has to support myself gettijg back up   Are your limitations mainly due to your pain?  Yes   What are your additional complaints, if any? None   Is there ever a time during the day when your pain stops, even for a brief moment, before returning? Yes   If yes, when your pain stops, does it disappear completely? Is it totally gone? No   Does bending forward make your typical pain worse? Yes   Morning: Worse during   Afternoon: better   Evening:  better   Nighttime: better   Standing:  worse   Lying on stomach: Better   Lying on back: better   Sitting:  worse   Walking: better   Climbing stairs: better   Emergency department  No   Health care providers (hold down the control key, and click to select multiple responses) Pain management   Investigations done (hold down the control key, and click to select multiple responses) X-ray   Procedures (hold down the control key, and  click to select multiple responses) None   Have you had any surgery on your back?  No   Please janice what you are experiencing. 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 College   What is your work status? Employed   How did you hear about the Healthyback program?  Employer referral

## 2024-02-06 ENCOUNTER — CLINICAL SUPPORT (OUTPATIENT)
Dept: OTHER | Facility: CLINIC | Age: 44
End: 2024-02-06

## 2024-02-06 DIAGNOSIS — Z00.8 ENCOUNTER FOR OTHER GENERAL EXAMINATION: ICD-10-CM

## 2024-02-07 VITALS
BODY MASS INDEX: 23.56 KG/M2 | WEIGHT: 138 LBS | SYSTOLIC BLOOD PRESSURE: 99 MMHG | DIASTOLIC BLOOD PRESSURE: 64 MMHG | HEIGHT: 64 IN

## 2024-02-07 LAB
GLUCOSE SERPL-MCNC: 90 MG/DL (ref 60–140)
HDLC SERPL-MCNC: 50 MG/DL
POC CHOLESTEROL, LDL (DOCK): 75 MG/DL
POC CHOLESTEROL, TOTAL: 140 MG/DL
TRIGL SERPL-MCNC: 74 MG/DL

## 2024-02-14 ENCOUNTER — CLINICAL SUPPORT (OUTPATIENT)
Dept: REHABILITATION | Facility: OTHER | Age: 44
End: 2024-02-14
Payer: COMMERCIAL

## 2024-02-14 DIAGNOSIS — R26.89 DECREASED BACK MOBILITY: ICD-10-CM

## 2024-02-14 DIAGNOSIS — G89.29 CHRONIC MIDLINE LOW BACK PAIN WITHOUT SCIATICA: ICD-10-CM

## 2024-02-14 DIAGNOSIS — R68.89 DECREASED ACTIVITY TOLERANCE: Primary | ICD-10-CM

## 2024-02-14 DIAGNOSIS — M54.50 CHRONIC MIDLINE LOW BACK PAIN WITHOUT SCIATICA: ICD-10-CM

## 2024-02-14 DIAGNOSIS — R29.898 DECREASED STRENGTH OF TRUNK AND BACK: ICD-10-CM

## 2024-02-14 PROCEDURE — 97750 PHYSICAL PERFORMANCE TEST: CPT | Mod: 32

## 2024-02-14 NOTE — PLAN OF CARE
[facility]  Physical Therapy Lumbar Evaluation      Name: Sarah Padron  Clinic Number: [id number]    Therapy Diagnosis:   Encounter Diagnoses   Name Primary?    Chronic midline low back pain without sciatica     Decreased activity tolerance Yes    Decreased strength of trunk and back     Decreased back mobility      Physician: Leanne Mobley, *    Physician Orders: PT Eval and Treat  Medical Diagnosis from Referral:   1. Decreased activity tolerance    2. Chronic midline low back pain without sciatica    3. Decreased strength of trunk and back    4. Decreased back mobility      Evaluation Date: 2/14/2024  Authorization Period Expiration: 2/1/2025  Plan of Care Expiration: 5/14/2024  Reassessment Due: 3/14/2024  Visit # / Visits authorized: 1/1    Time In: 8:10  Time Out: 8:50  Total Billable Time: 40 minutes  INSURANCE and OUTCOMES: Program Benefit Group with Lumbar Outcomes (Oswestry and AQoL) 1/3    Precautions: standard    Pattern of pain determined: 1 PEP    Subjective     Date of onset/History of current condition: Sarah reports anything bending over more then 15-20 deg is extremely painful, difficulty bending to pick things up off the floor is difficult. Pain gradually started, currently the pain is always there, she limits herself. Some work related difficulty switching out equipment and transferring patients     Per MD:  Ms Padron is a 42 yo female here for evaluation for the healthy back lumbar program.  she has had low back pain for 2 years with gradual onset.  The pain is midline low back dominant and goes to the left and right side.  The pain is achy and sharp with bending.  There is no tingling, numbness, burning or weakness.  The pain is intermittent 0-6/10.  It is worse with bending, standing, sitting, wearing lead, lifting, and morning.  It is better walking, lying on back and stomach, climbing stairs, afternoon, evening, and night.  She has been doing PT at [facility], and it has helped. She has not had other treatment.  Her goals are bending, standing, and lifting laundry.  Pattern 1        Medical History:   No past medical history on file.    Surgical History:   Sarah Padron  has a past surgical history that includes Laparoscopic gastric banding; Laparoscopic gastric banding (2008); Cholecystectomy; Abdominal surgery; Eye surgery (Right); Breast surgery; Gastric bypass; lap band removal (7/9/14); Hernia repair (N/A, 2016); Diagnostic laparoscopy (N/A, 5/18/2023); Diagnostic laparoscopy (N/A, 7/5/2023); Diagnostic laparoscopy (N/A, 9/24/2023); and Laparoscopic repair of incisional hernia (9/24/2023).    Medications:   Sarah has a current medication list which includes the following prescription(s): acyclovir 5%, bupropion, bupropion, ozempic, tranexamic acid, trazodone, trazodone, trazodone, valacyclovir, and valacyclovir.    Allergies:   Review of patient's allergies indicates:   Allergen Reactions    Dilaudid [hydromorphone] Itching    Meperidine Nausea And Vomiting and Itching     Other reaction(s): Other (See Comments)        Imaging: X-ray lumbar 12/2023  Lumbar vertebral body heights and alignment are preserved.  There is L4-5 disc space narrowing and degenerative endplate sclerosis and early marginal osteophyte formation.  The remainder of the disc spaces are preserved.  There may be minimal lower lumbar facet arthropathy.  Left upper quadrant surgical suture line noted and cholecystectomy clips present.     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  Where is your pain the worst? back  2.  Is your pain constant or intermittent? constant  3.  Does bending forward make your typical pain worse? yes  4.  Since the start of your back pain, has there been a change in your bowel or bladder? no  5.  What can't you do now that you use to be able to do? Bending, lifting, carrying    Pts goals: To be able to get dressed without having to sit down.    Red Flag Screening:   Cough/Sneeze Strain: (+)  Bladder/Bowel: (--)  Falls: (--)  Night pain: (--)  Unexplained weight loss: (--)  General health: good    Objective      Postural examination/scapula alignment: no postural deficits noted  Joint integrity: Firm end feeling  Skin integrity:WNL   Edema: None  Correction of posture: NT  Sitting: fair  Standing: good    GAIT:  Assistive Device used: none  Level of Assistance: independent  Patient displays the following gait deviations: no gait deviations observed.    MOVEMENT LOSS - Lumbar   Norms ROM Loss Initial   Flexion Fingers touch toes, sacral angle >/= 70 deg, uniform spinal curvature, posterior weight shift  major loss P!   Extension ASIS surpasses toes, spine of scapulae surpasses heels, uniform spinal curve hypermobile   Side glide Right  within functional limits P! L   Side glide Left  within functional limits P! L   Rotation Right PT observes contralateral shoulder hypermobile   Rotation Left PT observes contralateral shoulder hypermobile     Lower Extremity Strength  Right LE  Left LE    Hip flexion: 4+/5 Hip flexion: 4+/5   Hip extension:  4/5 Hip extension: 4/5   Hip abduction: 4+/5 Hip abduction: 4+/5   Hip adduction:  4+/5 Hip adduction:  4+/5   Knee Flexion 5/5 Knee Flexion 5/5   Knee Extension 5/5 Knee Extension 5/5   Ankle dorsiflexion: 5/5 Ankle  dorsiflexion: 5/5   Ankle plantarflexion: 5/5 Ankle plantarflexion: 5/5       Special Tests:   Test Name  Test Result   Prone Instability Test (--)   SI Joint Provocation Test (--)   Thigh thrust (--)   Sacral Thrust (--)   Straight Leg Raise (--)   Neural Tension Test (--)   Crossed Straight Leg Raise (--)     NEUROLOGICAL SCREENING:     Sensory deficits:  Intact to light touch    Reflexes:    Left Right   Patella Tendon 2+ 2+   Clonus (--) (--)     REPEATED TEST MOVEMENTS:    Baseline symptoms:  Repeated Flexion in Standing better   Repeated Extension in Standing no effect   Repeated Flexion in lying better   Repeated Extension in lying  abolished       STATIC TESTS and other movements:   Prone lie better   Prone lie on elbows better   Sitting slouched  no effect   Sitting erect no effect       Lumbar testing Visit 2      Intake/Outcome for FOTO Lumbar Surveys    Therapist reviewed FOTO scores for Sarah Padron on 2/14/2024  FOTO documents entered into [employer] - see Media section.        2/14/2024     8:49 AM   Oswestry Questionnaire Review   Pain Intensity 1   Personal Care (Washing, Dressing) 0   Lifting 4   Walking 0   Sitting 1   Standing 1   Sleeping 0   Social Life 0   Traveling 1   Employment/Homemaking 2   Score 10            Treatment     Treatment Time In: 8:25  Treatment Time Out: 8:50  Total Treatment time separate from Evaluation: 25 minutes    Sarah received therapeutic exercises to develop/improve posture, lumbar ROM, strength, and muscular endurance for 10 minutes including the following exercises:     Extension in Lying x10  Extension in standing x10  Double knee to chest 10x  Posterior pelvic tilt 10x      MedX Testing:  MedX testing to be performed next visit    Therapeutic Education/Activity provided for 15 minutes:   - Patient was given an [facility] Visit 1 handout which discusses the following:  - what to expect in therapy  - an overview of the program, including health  coaching and wellness  - importance of spinal hygiene, proper posture, lifting mechanics, sleep quality, and nutrition/hydration   - Jimena roll trialed, recommended, and purchase information was provided.  - Patient received a handout regarding anticipated muscular soreness following the isometric test and strategies for management were reviewed with patient including stretching, using ice and scheduled rest.   - Patient received verbal education on the following:   - Healthy Back program   - purpose of the isometric test  - safe progression of lumbar strengthening, wellness approach, and systemic strengthening.   - safe usage of MedX machine and testing protocols.      Written Home Exercises Provided: yes.    HEP AS FOLLOWS:  Extension in Lying   Extension in standing   Double knee to chest   Posterior pelvic tilt     Exercises were reviewed and Sarah was able to demonstrate them prior to the end of the session.  Sarah demonstrated good  understanding of the education provided.     See EMR under Patient Instructions for exercises provided 2/14/2024.    Assessment     Sarah is a 43 y.o. female referred to [facility] with a medical diagnosis of M54.50,G89.29 (ICD-10-CM) - Chronic midline low back pain without sciatica. Pt presents with complaints of central and bilateral low back pain that is a large square shape at approx levels L3-L5. She reports difficulty first thing in the morning, with bending, lifting, and carrying. She has poor mobility at the lumbar spine with flexion. She was able to improve mobility with repetitive flexion and decreased pain with repetitive knees to chest and pelvic tilts. She denies pain and had full mobility with extension movements. Pt given exercises to improve pain free mobility and increase strength and functional mobility at the low back.    Pain Pattern: 1 PEP       Pt prognosis is Excellent.     Pt will benefit from skilled outpatient Physical Therapy to  address the deficits stated above and in the chart below, to provide pt/family education, and to maximize pt's level of independence. Based on the above history and physical examination an active physical therapy program is recommended.      Plan of care discussed with patient: Yes  Pt's spiritual, cultural and educational needs considered and patient is agreeable to the plan of care and goals as stated below:     Anticipated Barriers for therapy: none    PT Evaluation Completed? Yes    Medical necessity is demonstrated by the following problem list:    History  Co-morbidities and personal factors that may impact the plan of care [] LOW: no personal factors / co-morbidities  [x] MODERATE: 1-2 personal factors / co-morbidities  [] HIGH: 3+ personal factors / co-morbidities    Moderate / High Support Documentation:   Co-morbidities affecting plan of care:     Personal Factors:   lifestyle     Examination  Body Structures and Functions, activity limitations and participation restrictions that may impact the plan of care [] LOW: addressing 1-2 elements  [] MODERATE: 3+ elements  [x] HIGH: 4+ elements (please support below)    Moderate / High Support Documentation: bending, lifting, transferring patients, household chores, LE dressing     Clinical Presentation [] LOW: stable  [x] MODERATE: Evolving  [] HIGH: Unstable     Decision Making/ Complexity Score: moderate         GOALS: Pt is in agreement with the following goals.    Short term goals:  6 weeks or 10 visits   - Pt will demonstrate increased lumbar ROM by at least **TBD** degrees from the initial ROM value with improvements noted in functional ROM and ability to perform ADLs. Appropriate and Ongoing  - Pt will demonstrate increased MedX average isometric strength value by **TBD**% from initial test resulting in improved ability to perform bending, lifting, and carrying activities safely, confidently. Appropriate and Ongoing  - Pt will report a reduction in worst  pain score by 1-2 points for improved tolerance for first thing in AM. Appropriate and Ongoing  - Pt able to perform HEP correctly with minimal cueing or supervision from therapist to encourage independent management of symptoms. Appropriate and Ongoing    Long term goals: 10 weeks or 20 visits   - Pt will demonstrate increased lumbar ROM by at least **TBD** degrees from initial ROM value, resulting in improved ability to perform functional forward bending while standing and sitting. Appropriate and Ongoing  - Pt will demonstrate increased MedX average isometric strength value by **TBD**% from initial test resulting in improved ability to perform bending, lifting, and carrying activities safely and confidently. Appropriate and Ongoing  - Pt to demonstrate ability to independently control and reduce their pain through posture positioning and mechanical movements throughout a typical day. Appropriate and Ongoing  - Pt will demonstrate reduced pain and improved functional outcomes as reported on the Oswestry Disability Index by reaching a score of 3 or less in order to demonstrate subjective improvement in pt's condition.   Appropriate and Ongoing  - Pt will demonstrate independence with the HEP at discharge. Appropriate and Ongoing  - Pt will be able to bend and lift up to 25# with proper posture. (patient goal) Appropriate and Ongoing    Plan     Outpatient physical therapy 2x week for 10 weeks or 20 visits to include the following:   - Patient education  - Therapeutic exercise  - Manual therapy  - Performance testing   - Neuromuscular Re-education  - Therapeutic activity   - Modalities    Pt may be seen by PTA as part of the rehabilitation team.     Therapist: Ana Reynaga, PT  2/14/2024

## 2024-02-21 ENCOUNTER — CLINICAL SUPPORT (OUTPATIENT)
Dept: REHABILITATION | Facility: OTHER | Age: 44
End: 2024-02-21
Payer: COMMERCIAL

## 2024-02-21 DIAGNOSIS — R29.898 DECREASED STRENGTH OF TRUNK AND BACK: ICD-10-CM

## 2024-02-21 DIAGNOSIS — R68.89 DECREASED ACTIVITY TOLERANCE: Primary | ICD-10-CM

## 2024-02-21 DIAGNOSIS — R26.89 DECREASED BACK MOBILITY: ICD-10-CM

## 2024-02-21 PROCEDURE — 97750 PHYSICAL PERFORMANCE TEST: CPT | Mod: 32

## 2024-02-21 NOTE — PROGRESS NOTES
[facility]  Physical Therapy Treatment Note     Name: Sarah Padron  Clinic Number: [id number]    Therapy Diagnosis:   Encounter Diagnoses   Name Primary?    Decreased activity tolerance Yes    Decreased strength of trunk and back     Decreased back mobility      Physician: Any Squires PA-C    Visit Date: 2/21/2024    Physician Orders: PT Eval and Treat  Medical Diagnosis from Referral:   1. Decreased activity tolerance    2. Chronic midline low back pain without sciatica    3. Decreased strength of trunk and back    4. Decreased back mobility       Evaluation Date: 2/14/2024  Authorization Period Expiration: 2/1/2025  Plan of Care Expiration: 5/14/2024  Reassessment Due: 3/14/2024  Visit #/Visits authorized: 2/20   MedX Testing:MedX testing visit 2    PTA Visit #: 0/5     Time In: 12:30  Time Out: 13:20  Total Billable Time: 50 minutes  INSURANCE and OUTCOMES: Program Benefit Group with Lumbar Outcomes (Oswestry and AQoL) 1/3     Precautions: standard     Pattern of pain determined: 1 PEP    Subjective     Sarah Padron denies pain at this time and had been doing the stretches throughout the day.      Patient reports tolerating previous visit fair  Patient reports their pain to be 0/10 on a 0-10 scale with 0 being no pain and 10 being the worst pain imaginable.  Pain Location:  bilateral low back      Occupation: X ray tech at [employer]   Leisure: reading, exercise, going to park     Pts goals: To be able to get dressed without having to sit down.     Objective      Lumbar  Isometric Testing on Med X equipment: Testing administered by PT    Test Initial Baseline Midpoint Final   Date 2/21/2024     ROM 0-54 deg     Max Peak Torque 84      Min Peak Torque 45      Flex/Ext Ratio 1.8:1     % below normative data 56     % gain from initial test Not available visit 1           2/14/2024     8:49 AM   Oswestry Questionnaire Review   Pain Intensity 1   Personal Care  (Washing, Dressing) 0   Lifting 4   Walking 0   Sitting 1   Standing 1   Sleeping 0   Social Life 0   Traveling 1   Employment/Homemaking 2   Score 10           Treatment     Sarah received the treatments listed below:      Medical MedX Treatment as follows:  MedX testing performed day 2: Patient  received neuromuscular education to engage spinal musculature correctly for motor control and engagement of musculature for 20 minutes including the MedX exercise component and practice and standard testing. MedX dynamic exercise and baseline isometric test performed with instructions to guide the patient safely through the testing procedure. Patient instructed to perform isometric test correctly and safely while building to an optimal force with a pain-free effort. Patient also instructed that they should feel support/pressure from MedX restraints but no pain/discomfort, and encouraged to report any pain to therapist. Patient demonstrated appropriate understanding of information and tolerance of test.  Education regarding purpose of test, safety during test given, and reviewed possible more soreness and strategies.             2/21/2024     1:32 PM   [facility]   Visit Number 2   VAS Pain Rating 0   Treadmill Time (in min.) 5 min   Extension in Lying 20   Extension in Standing 10   Flexion in Lying 10   Lumbar Extension Seat Pad 2   Femur Restraint 6   Top Dead Center 24   Counterweight 213   Lumbar Flexion 54   Lumbar Extension 0   Lumbar Peak Torque 84 ft. lbs.   Min Torque 45   Test Percent Below Normative Data 56 %   Lumbar Weight 45 lbs   Ice - Z Lie (in min.) 5         Sarah participated in neuromuscular re-education activities to improve balance, coordination, proprioception, motor control and/or posture for 00 minutes. The following activities were included:        Sarah participated in therapeutic exercises to develop strength, endurance, ROM, flexibility, posture, and core stabilization for 30  "minutes including:      [x] Treadmill 5'    [x] Extension in Lying 2x10  [x] Extension in standing x10  [x] Posterior pelvic tilt 10x  [x] + PPT + Bridge 2x10, 5"  [x] Double knee to chest 10x  [x] +Supine Lower trunk rotation 10x        Peripheral muscle strengthening which included one set of 15-20 repetitions at a slow and controlled 10-13 second per rep pace focused on strengthening supporting musculature in order to improve body mechanics and functional mobility. Patient and therapist focused on proper form during treatment to ensure optimal strengthening of each targeted muscle group.  Machines utilized included:Torso rotation, Leg Ext, Leg Curl, Chest Press, and Rowing  To be added next visit:Triceps, Biceps, Hip Abd, and Leg Press      Sarah participated in dynamic functional therapeutic activities to improve functional performance and simulate household and community activities for 00  minutes. The following activities were included:      Sarah received manual therapy techniques for 00  minutes. The following activities were included:      Pt given cold pack for 5 minutes to low back in supine.    Patient Education and Home Exercises     Home exercises include:  Extension in Lying   Extension in standing   Double knee to chest   Posterior pelvic tilt   Cardio program (V5): -  Lifting education (V11): -  Posture/Lumbar roll: ABD  Fridge Magnet Discharge handout (date given): -  Equipment at home/gym membership: no      Education provided:   - Patient received education in benefits of progressing mobility and strengthening gradually  - Pt educated in proper body mechanics and lifting techniques.  - Discussed exertion scale, slow progressive resistance protocol to promote safe and healthy strengthening of supportive musculature  by performing 15-20 reps, 7 sec per rep, and increasing weight by 5 % at 20 reps only if there ex done safely, slowly, using correct musculature, exertion and no pain.  Patient " "expressed understanding.  -Pt educated on strategies to safely perform examination and exercise using "Stop Light" analogy to describe how to avoid or stop irritating motions, proceed with caution with some movements, and progress positive exercises.     Written Home Exercises Provided: Patient instructed to cont prior HEP.  Exercises were reviewed and Sarah was able to demonstrate them prior to the end of the session.  Sarah demonstrated good  understanding of the education provided.     See EMR under Patient Instructions for exercises provided prior visit.    Assessment     Sarah presents to second healthy back visit reporting no complaints of low back pain, added glut bridges with endurance hold. She was able to complete without increased discomfort in the low back.  Pt completed lumbar MedX testing and is 56% below averages. Pt was also able to complete half of the peripheral strengthening exercises without increased discomfort and will complete the complete circuit next visit as tolerated.    Patient is making good progress towards established goals.  Pt will continue to benefit from skilled outpatient physical therapy to address the deficits stated in the impairment chart, provide pt/family education and to maximize pt's level of independence in the home and community environment.     Anticipated Barriers for therapy: none    Pt's spiritual, cultural and educational needs considered and pt agreeable to plan of care and goals as stated below:   GOALS: Pt is in agreement with the following goals.     Short term goals:  6 weeks or 10 visits   - Pt will demonstrate increased lumbar ROM by at least 3 degrees from the initial ROM value with improvements noted in functional ROM and ability to perform ADLs. Appropriate and Ongoing  - Pt will demonstrate increased MedX average isometric strength value by 15% from initial test resulting in improved ability to perform bending, lifting, and carrying activities " safely, confidently. Appropriate and Ongoing  - Pt will report a reduction in worst pain score by 1-2 points for improved tolerance for first thing in AM. Appropriate and Ongoing  - Pt able to perform HEP correctly with minimal cueing or supervision from therapist to encourage independent management of symptoms. Appropriate and Ongoing     Long term goals: 10 weeks or 20 visits   - Pt will demonstrate increased lumbar ROM by at least 6 degrees from initial ROM value, resulting in improved ability to perform functional forward bending while standing and sitting. Appropriate and Ongoing  - Pt will demonstrate increased MedX average isometric strength value by 30% from initial test resulting in improved ability to perform bending, lifting, and carrying activities safely and confidently. Appropriate and Ongoing  - Pt to demonstrate ability to independently control and reduce their pain through posture positioning and mechanical movements throughout a typical day. Appropriate and Ongoing  - Pt will demonstrate reduced pain and improved functional outcomes as reported on the Oswestry Disability Index by reaching a score of 3 or less in order to demonstrate subjective improvement in pt's condition.   Appropriate and Ongoing  - Pt will demonstrate independence with the HEP at discharge. Appropriate and Ongoing  - Pt will be able to bend and lift up to 25# with proper posture. (patient goal) Appropriate and Ongoing       Plan     Continue with established Plan of Care towards established PT goals.     Therapist: Ana Reynaga, PT  2/21/2024

## 2024-03-01 ENCOUNTER — CLINICAL SUPPORT (OUTPATIENT)
Dept: REHABILITATION | Facility: OTHER | Age: 44
End: 2024-03-01
Payer: COMMERCIAL

## 2024-03-01 DIAGNOSIS — R26.89 DECREASED BACK MOBILITY: ICD-10-CM

## 2024-03-01 DIAGNOSIS — R29.898 DECREASED STRENGTH OF TRUNK AND BACK: ICD-10-CM

## 2024-03-01 DIAGNOSIS — R68.89 DECREASED ACTIVITY TOLERANCE: Primary | ICD-10-CM

## 2024-03-01 PROCEDURE — 97750 PHYSICAL PERFORMANCE TEST: CPT | Mod: 32

## 2024-03-01 NOTE — PROGRESS NOTES
[facility]  Physical Therapy Treatment Note     Name: Sarah Padron  Clinic Number: [id number]    Therapy Diagnosis:   Encounter Diagnoses   Name Primary?    Decreased activity tolerance Yes    Decreased strength of trunk and back     Decreased back mobility      Physician: Any Squires PA-C    Visit Date: 3/1/2024    Physician Orders: PT Eval and Treat  Medical Diagnosis from Referral:   1. Decreased activity tolerance    2. Chronic midline low back pain without sciatica    3. Decreased strength of trunk and back    4. Decreased back mobility       Evaluation Date: 2/14/2024  Authorization Period Expiration: 2/1/2025  Plan of Care Expiration: 5/14/2024  Reassessment Due: 3/14/2024  Visit #/Visits authorized: 3/20   MedX Testing:MedX testing visit 2    PTA Visit #: 0/5     Time In: 9:27 AM  Time Out: 10:26 AM  Total Billable Time: 55 minutes  INSURANCE and OUTCOMES: Program Benefit Group with Lumbar Outcomes (Oswestry and AQoL) 1/3     Precautions: standard     Pattern of pain determined: 1 PEP    Subjective     Sarah Padron reports no worsening or improvement in LBP. Aggravating activities are mostly forward bending and lifting of any sort.     Patient reports tolerating previous visit fair  Patient reports their pain to be 1/10 on a 0-10 scale with 0 being no pain and 10 being the worst pain imaginable.  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     Objective      Lumbar  Isometric Testing on Med X equipment: Testing administered by PT    Test Initial Baseline Midpoint Final   Date 2/21/2024     ROM 0-54 deg     Max Peak Torque 84      Min Peak Torque 45      Flex/Ext Ratio 1.8:1     % below normative data 56     % gain from initial test Not available visit 1           2/14/2024     8:49 AM   Oswestry Questionnaire  Review   Pain Intensity 1   Personal Care (Washing, Dressing) 0   Lifting 4   Walking 0   Sitting 1   Standing 1   Sleeping 0   Social Life 0   Traveling 1   Employment/Homemaking 2   Score 10           Treatment     Sarah received the treatments listed below:      Medical MedX Treatment as follows:  MedX testing performed day 2: Patient  received neuromuscular education to engage spinal musculature correctly for motor control and engagement of musculature for 5 minutes including the MedX exercise component and practice and standard testing. MedX dynamic exercise and baseline isometric test performed with instructions to guide the patient safely through the testing procedure. Patient instructed to perform isometric test correctly and safely while building to an optimal force with a pain-free effort. Patient also instructed that they should feel support/pressure from MedX restraints but no pain/discomfort, and encouraged to report any pain to therapist. Patient demonstrated appropriate understanding of information and tolerance of test.  Education regarding purpose of test, safety during test given, and reviewed possible more soreness and strategies.           3/1/2024     9:33 AM   [facility]   Visit Number 3   VAS Pain Rating 1   Treadmill Time (in min.) 5 min   Extension in Lying 20   Extension in Standing 10   Flexion in Lying 10   Lumbar Weight 42 lbs   Repetitions 15   Rating of Perceived Exertion 2   Ice - Z Lie (in min.) 5         Sarah participated in neuromuscular re-education activities to improve balance, coordination, proprioception, motor control and/or posture for 20 minutes. The following activities were included:    [x] Posterior pelvic tilt 10x 2  [x] + SOC 2 x 10  [x] + TrA marching in hooklying x 10    Sarah participated in therapeutic exercises to develop strength, endurance, ROM, flexibility, posture, and core stabilization for 30 minutes including:    [x] Treadmill 5'    [x]  "Extension in Lying 2x10  [x] Extension in standing 2 x10  [x] PPT + Bridge 2x10, 5"  [x] Double knee to chest 10x  [x] Supine Lower trunk rotation 10x    Peripheral muscle strengthening which included one set of 15-20 repetitions at a slow and controlled 10-13 second per rep pace focused on strengthening supporting musculature in order to improve body mechanics and functional mobility. Patient and therapist focused on proper form during treatment to ensure optimal strengthening of each targeted muscle group.  Machines utilized included:Torso rotation, Leg Ext, Leg Curl, Chest Press, and Rowing  To be added next visit:Triceps, Biceps, Hip Abd, and Leg Press      Sarah participated in dynamic functional therapeutic activities to improve functional performance and simulate household and community activities for 00  minutes. The following activities were included:      Sarah received manual therapy techniques for 00  minutes. The following activities were included:      Pt given cold pack for 5 minutes to low back in supine.    Patient Education and Home Exercises     Home exercises include:  Extension in Lying   Extension in standing   Double knee to chest   Posterior pelvic tilt     Cardio program (V5): -  Lifting education (V11): -  Posture/Lumbar roll: ABD  Fridge Magnet Discharge handout (date given): -  Equipment at home/gym membership: no      Education provided:   - Patient received education in benefits of progressing mobility and strengthening gradually  - Pt educated in proper body mechanics and lifting techniques.  - Discussed exertion scale, slow progressive resistance protocol to promote safe and healthy strengthening of supportive musculature  by performing 15-20 reps, 7 sec per rep, and increasing weight by 5 % at 20 reps only if there ex done safely, slowly, using correct musculature, exertion and no pain.  Patient expressed understanding.  -Pt educated on strategies to safely perform examination " "and exercise using "Stop Light" analogy to describe how to avoid or stop irritating motions, proceed with caution with some movements, and progress positive exercises.     Written Home Exercises Provided: Patient instructed to cont prior HEP.  Exercises were reviewed and Sarah was able to demonstrate them prior to the end of the session.  Sarah demonstrated good  understanding of the education provided.     See EMR under Patient Instructions for exercises provided prior visit.    Assessment     Sarah presents reporting low back pain unchanged, and most provoked when bending or lifting. Introduced lower extremity mobility while activating transverse abdominus. Patient displaying compensatory hip extension with posterior pelvic tilts, however able to reduce with manual cues at lumbar spine. Patient began lumbar MedX training at 50% peak torque determined last visit. Patient was able to complete 15 repetitions at 42 ft. Lbs. With an RPE of 2/10. Pt was also able to complete all of the peripheral strengthening exercises without increased discomfort. Continue to progress patient through [facility] protocol as tolerated.    Patient is making good progress towards established goals.  Pt will continue to benefit from skilled outpatient physical therapy to address the deficits stated in the impairment chart, provide pt/family education and to maximize pt's level of independence in the home and community environment.     Anticipated Barriers for therapy: none    Pt's spiritual, cultural and educational needs considered and pt agreeable to plan of care and goals as stated below:   GOALS: Pt is in agreement with the following goals.     Short term goals:  6 weeks or 10 visits   - Pt will demonstrate increased lumbar ROM by at least 3 degrees from the initial ROM value with improvements noted in functional ROM and ability to perform ADLs. Appropriate and Ongoing  - Pt will demonstrate increased MedX average isometric " strength value by 15% from initial test resulting in improved ability to perform bending, lifting, and carrying activities safely, confidently. Appropriate and Ongoing  - Pt will report a reduction in worst pain score by 1-2 points for improved tolerance for first thing in AM. Appropriate and Ongoing  - Pt able to perform HEP correctly with minimal cueing or supervision from therapist to encourage independent management of symptoms. Appropriate and Ongoing     Long term goals: 10 weeks or 20 visits   - Pt will demonstrate increased lumbar ROM by at least 6 degrees from initial ROM value, resulting in improved ability to perform functional forward bending while standing and sitting. Appropriate and Ongoing  - Pt will demonstrate increased MedX average isometric strength value by 30% from initial test resulting in improved ability to perform bending, lifting, and carrying activities safely and confidently. Appropriate and Ongoing  - Pt to demonstrate ability to independently control and reduce their pain through posture positioning and mechanical movements throughout a typical day. Appropriate and Ongoing  - Pt will demonstrate reduced pain and improved functional outcomes as reported on the Oswestry Disability Index by reaching a score of 3 or less in order to demonstrate subjective improvement in pt's condition.   Appropriate and Ongoing  - Pt will demonstrate independence with the HEP at discharge. Appropriate and Ongoing  - Pt will be able to bend and lift up to 25# with proper posture. (patient goal) Appropriate and Ongoing       Plan     Continue with established Plan of Care towards established PT goals.     Therapist: Shannon Castellanos, PT  3/1/2024

## 2024-03-07 NOTE — PROGRESS NOTES
[facility]  Physical Therapy Treatment Note     Name: Sarah Padron  Clinic Number: [id number]    Therapy Diagnosis:   Encounter Diagnoses   Name Primary?    Decreased activity tolerance Yes    Decreased strength of trunk and back     Decreased back mobility        Physician: Any Squires PA-C    Visit Date: 3/8/2024    Physician Orders: PT Eval and Treat  Medical Diagnosis from Referral:   1. Decreased activity tolerance    2. Chronic midline low back pain without sciatica    3. Decreased strength of trunk and back    4. Decreased back mobility       Evaluation Date: 2/14/2024  Authorization Period Expiration: 2/1/2025  Plan of Care Expiration: 5/14/2024  Reassessment Due: 3/14/2024  Visit #/Visits authorized: 4/20   MedX Testing:MedX testing visit 2    PTA Visit #: 0/5     Time In: 9:00 AM  Time Out: 10:00 AM  Total Billable Time: 55 minutes  INSURANCE and OUTCOMES: Program Benefit Group with Lumbar Outcomes (Oswestry and AQoL) 1/3     Precautions: standard     Pattern of pain determined: 1 PEP  Subjective   Sarah Padron reports she feels great after doing the exercises but the relief doesn't last long.      Patient reports tolerating previous visit fair  Patient reports their pain to be 1/10 on a 0-10 scale with 0 being no pain and 10 being the worst pain imaginable.  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   Objective    Lumbar  Isometric Testing on Med X equipment: Testing administered by PT    Test Initial Baseline Midpoint Final   Date 2/21/2024     ROM 0-54 deg     Max Peak Torque 84      Min Peak Torque 45      Flex/Ext Ratio 1.8:1     % below normative data 56     % gain from initial test Not available visit 1           2/14/2024     8:49 AM   Oswestry Questionnaire Review   Pain Intensity 1   Personal  "Care (Washing, Dressing) 0   Lifting 4   Walking 0   Sitting 1   Standing 1   Sleeping 0   Social Life 0   Traveling 1   Employment/Homemaking 2   Score 10           Treatment     Sarah received the treatments listed below:      Sarah participated in neuromuscular re-education activities to improve balance, coordination, proprioception, motor control and/or posture for 20 minutes. The following activities were included:    [x] Posterior pelvic tilt + marching x10  [x] TrA brace BKFO RTB x10  [x] SOC 2 x 10  [x] + TrA brace + SLR x10  [x] + Sl clamshell w/RTB x20    Sarah participated in therapeutic exercises to develop strength, endurance, ROM, flexibility, posture, and core stabilization for 35 minutes including:    [x] Treadmill 5'  [] Extension in Lying 2x10  [x] Extension in standing 2 x10  [x] Bridge w/RTB x15,3"  [] Double knee to chest 10x  [x] Supine Lower trunk rotation 10x    Peripheral muscle strengthening which included one set of 15-20 repetitions at a slow and controlled 10-13 second per rep pace focused on strengthening supporting musculature in order to improve body mechanics and functional mobility. Patient and therapist focused on proper form during treatment to ensure optimal strengthening of each targeted muscle group.  Machines utilized included:Torso rotation, Leg Ext, Leg Curl, Chest Press, and Rowing  To be added next visit:Triceps, Biceps, Hip Abd, and Leg Press      Sarah participated in dynamic functional therapeutic activities to improve functional performance and simulate household and community activities for 00  minutes. The following activities were included:      Sarah received manual therapy techniques for 00  minutes. The following activities were included:      Pt given cold pack for 5 minutes to low back in supine.  Patient Education and Home Exercises   Home exercises include:  Extension in Lying   Extension in standing   Double knee to chest   Posterior pelvic " "tilt     Cardio program (V5): -  Lifting education (V11): -  Posture/Lumbar roll: ABD  Fridge Magnet Discharge handout (date given): -  Equipment at home/gym membership: no      Education provided:   - Patient received education in benefits of progressing mobility and strengthening gradually  - Pt educated in proper body mechanics and lifting techniques.  - Discussed exertion scale, slow progressive resistance protocol to promote safe and healthy strengthening of supportive musculature  by performing 15-20 reps, 7 sec per rep, and increasing weight by 5 % at 20 reps only if there ex done safely, slowly, using correct musculature, exertion and no pain.  Patient expressed understanding.  -Pt educated on strategies to safely perform examination and exercise using "Stop Light" analogy to describe how to avoid or stop irritating motions, proceed with caution with some movements, and progress positive exercises.     Written Home Exercises Provided: Patient instructed to cont prior HEP.  Exercises were reviewed and Sarah was able to demonstrate them prior to the end of the session.  Sarah demonstrated good  understanding of the education provided.     See EMR under Patient Instructions for exercises provided prior visit.    Assessment   Sarah tolerated progressed core and lumbopelvic stabilization exercises well without adverse effects and appropriate challenge. Discussed the importance of TrA bracing with work related duties. Patient able to perform 20 reps on l/s medx machine with an RPE of 2/10, consider a 10% increase nv. Will continue to progress patient through Healthy Back protocol as tolerated.    Patient is making good progress towards established goals.  Pt will continue to benefit from skilled outpatient physical therapy to address the deficits stated in the impairment chart, provide pt/family education and to maximize pt's level of independence in the home and community environment.     Anticipated " Barriers for therapy: none    Pt's spiritual, cultural and educational needs considered and pt agreeable to plan of care and goals as stated below:   GOALS: Pt is in agreement with the following goals.     Short term goals:  6 weeks or 10 visits   - Pt will demonstrate increased lumbar ROM by at least 3 degrees from the initial ROM value with improvements noted in functional ROM and ability to perform ADLs. Appropriate and Ongoing  - Pt will demonstrate increased MedX average isometric strength value by 15% from initial test resulting in improved ability to perform bending, lifting, and carrying activities safely, confidently. Appropriate and Ongoing  - Pt will report a reduction in worst pain score by 1-2 points for improved tolerance for first thing in AM. Appropriate and Ongoing  - Pt able to perform HEP correctly with minimal cueing or supervision from therapist to encourage independent management of symptoms. Appropriate and Ongoing     Long term goals: 10 weeks or 20 visits   - Pt will demonstrate increased lumbar ROM by at least 6 degrees from initial ROM value, resulting in improved ability to perform functional forward bending while standing and sitting. Appropriate and Ongoing  - Pt will demonstrate increased MedX average isometric strength value by 30% from initial test resulting in improved ability to perform bending, lifting, and carrying activities safely and confidently. Appropriate and Ongoing  - Pt to demonstrate ability to independently control and reduce their pain through posture positioning and mechanical movements throughout a typical day. Appropriate and Ongoing  - Pt will demonstrate reduced pain and improved functional outcomes as reported on the Oswestry Disability Index by reaching a score of 3 or less in order to demonstrate subjective improvement in pt's condition.   Appropriate and Ongoing  - Pt will demonstrate independence with the HEP at discharge. Appropriate and Ongoing  - Pt will  be able to bend and lift up to 25# with proper posture. (patient goal) Appropriate and Ongoing     Plan     Continue with established Plan of Care towards established PT goals.     Therapist: Nirmala Mathias, PTA  3/8/2024

## 2024-03-08 ENCOUNTER — CLINICAL SUPPORT (OUTPATIENT)
Dept: REHABILITATION | Facility: OTHER | Age: 44
End: 2024-03-08
Payer: COMMERCIAL

## 2024-03-08 DIAGNOSIS — R29.898 DECREASED STRENGTH OF TRUNK AND BACK: ICD-10-CM

## 2024-03-08 DIAGNOSIS — R26.89 DECREASED BACK MOBILITY: ICD-10-CM

## 2024-03-08 DIAGNOSIS — R68.89 DECREASED ACTIVITY TOLERANCE: Primary | ICD-10-CM

## 2024-03-08 PROCEDURE — 97750 PHYSICAL PERFORMANCE TEST: CPT | Mod: 32

## 2024-03-11 NOTE — PROGRESS NOTES
[facility]  Physical Therapy Treatment Note     Name: Sarah Padron  Clinic Number: [id number]    Therapy Diagnosis:   Encounter Diagnoses   Name Primary?    Decreased activity tolerance Yes    Decreased strength of trunk and back     Decreased back mobility          Physician: Any Squires PA-C    Visit Date: 3/12/2024    Physician Orders: PT Eval and Treat  Medical Diagnosis from Referral:   1. Decreased activity tolerance    2. Chronic midline low back pain without sciatica    3. Decreased strength of trunk and back    4. Decreased back mobility       Evaluation Date: 2/14/2024  Authorization Period Expiration: 2/1/2025  Plan of Care Expiration: 5/14/2024  Reassessment Due: 3/14/2024  Visit #/Visits authorized: 5/20   MedX Testing:MedX testing visit 2    PTA Visit #: 1/5     Time In: 930 AM  Time Out: 1020 AM   Total Billable Time: 50 minutes  INSURANCE and OUTCOMES: Program Benefit Group with Lumbar Outcomes (Oswestry and AQoL) 1/3     Precautions: standard     Pattern of pain determined: 1 PEP  Subjective   Sarah Padron reports her back feels great when she is doing the stretches. She has severe pain when moving pts because they insist on having the table too low. She has had gastric bypass and multiple emergency surgeries on her intestines.       Patient reports tolerating previous visit fair  Patient reports their pain to be 1/10 on a 0-10 scale with 0 being no pain and 10 being the worst pain imaginable.  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   Objective    Lumbar  Isometric Testing on Med X equipment: Testing administered by PT    Test Initial Baseline Midpoint Final   Date 2/21/2024     ROM 0-54 deg     Max Peak Torque 84      Min Peak Torque 45      Flex/Ext Ratio 1.8:1     % below normative data 56    "  % gain from initial test Not available visit 1           2/14/2024     8:49 AM   Oswestry Questionnaire Review   Pain Intensity 1   Personal Care (Washing, Dressing) 0   Lifting 4   Walking 0   Sitting 1   Standing 1   Sleeping 0   Social Life 0   Traveling 1   Employment/Homemaking 2   Score 10           Treatment     Sarah received the treatments listed below:      Sarah participated in neuromuscular re-education activities to improve balance, coordination, proprioception, motor control and/or posture for 20 minutes. The following activities were included:    [x] Posterior pelvic tilt + marching x10  [x] TrA brace BKFO RTB x10  [] SOC 2 x 10  [x] TrA brace + SLR x10  [] Sl clamshell w/RTB x20        3/12/2024     9:04 AM   [facility]   Visit Number 5   VAS Pain Rating 0   Treadmill Time (in min.) 5 min   Extension in Lying 20   Flexion in Lying 10   Lumbar Weight 50 lbs   Repetitions 20   Rating of Perceived Exertion 4   Ice - Z Lie (in min.) 5         Sarah participated in therapeutic exercises to develop strength, endurance, ROM, flexibility, posture, and core stabilization for 25 minutes including:    [x] Treadmill 5'  [x] Extension in Lying 2x10  [] Bridge w/RTB x15,3"  [x] Double knee to chest 10x  [x] Supine Lower trunk rotation 10x    Peripheral muscle strengthening which included one set of 15-20 repetitions at a slow and controlled 10-13 second per rep pace focused on strengthening supporting musculature in order to improve body mechanics and functional mobility. Patient and therapist focused on proper form during treatment to ensure optimal strengthening of each targeted muscle group.  Machines utilized included:Torso rotation, Leg Ext, Leg Curl, Chest Press, and Rowing  To be added next visit:Triceps, Biceps, Hip Abd, and Leg Press      Sarah participated in dynamic functional therapeutic activities to improve functional performance and simulate household and community activities " "for 05  minutes. The following activities were included:      Hip hinge x 10 x 10   Hip hinge with lat pulldown GTB x 10    Sarah received manual therapy techniques for 00  minutes. The following activities were included:      Pt given cold pack for 5 minutes to low back in supine.  Patient Education and Home Exercises   Home exercises include:  Extension in Lying   Extension in standing   Double knee to chest   Posterior pelvic tilt     Cardio program (V5): -  Lifting education (V11): -  Posture/Lumbar roll: ABD  Fridge Magnet Discharge handout (date given): -  Equipment at home/gym membership: no      Education provided:   - Patient received education in benefits of progressing mobility and strengthening gradually  - Pt educated in proper body mechanics and lifting techniques.  - Discussed exertion scale, slow progressive resistance protocol to promote safe and healthy strengthening of supportive musculature  by performing 15-20 reps, 7 sec per rep, and increasing weight by 5 % at 20 reps only if there ex done safely, slowly, using correct musculature, exertion and no pain.  Patient expressed understanding.  -Pt educated on strategies to safely perform examination and exercise using "Stop Light" analogy to describe how to avoid or stop irritating motions, proceed with caution with some movements, and progress positive exercises.     Written Home Exercises Provided: Patient instructed to cont prior HEP.  Exercises were reviewed and Sarah was able to demonstrate them prior to the end of the session.  Sarah demonstrated good  understanding of the education provided.     See EMR under Patient Instructions for exercises provided prior visit.    Assessment   Sarah tolerated session well.Pt reports to session with low levels of pain though pt reports very high levels of pain when performing work related duties. Initiated review of lifting mechanics and added lat pull down with hip hinge to start strengthening " the posterior chain to address work related pain. Pt required mod verbal cues for hip hinge vs squatting. MedX was performed at 50ft lbs with 20 reps performed at an exertion rating of 4/10. No issues with Peripherals.       Patient is making good progress towards established goals.  Pt will continue to benefit from skilled outpatient physical therapy to address the deficits stated in the impairment chart, provide pt/family education and to maximize pt's level of independence in the home and community environment.     Anticipated Barriers for therapy: none    Pt's spiritual, cultural and educational needs considered and pt agreeable to plan of care and goals as stated below:   GOALS: Pt is in agreement with the following goals.     Short term goals:  6 weeks or 10 visits   - Pt will demonstrate increased lumbar ROM by at least 3 degrees from the initial ROM value with improvements noted in functional ROM and ability to perform ADLs. Appropriate and Ongoing  - Pt will demonstrate increased MedX average isometric strength value by 15% from initial test resulting in improved ability to perform bending, lifting, and carrying activities safely, confidently. Appropriate and Ongoing  - Pt will report a reduction in worst pain score by 1-2 points for improved tolerance for first thing in AM. Appropriate and Ongoing  - Pt able to perform HEP correctly with minimal cueing or supervision from therapist to encourage independent management of symptoms. Appropriate and Ongoing     Long term goals: 10 weeks or 20 visits   - Pt will demonstrate increased lumbar ROM by at least 6 degrees from initial ROM value, resulting in improved ability to perform functional forward bending while standing and sitting. Appropriate and Ongoing  - Pt will demonstrate increased MedX average isometric strength value by 30% from initial test resulting in improved ability to perform bending, lifting, and carrying activities safely and confidently.  Appropriate and Ongoing  - Pt to demonstrate ability to independently control and reduce their pain through posture positioning and mechanical movements throughout a typical day. Appropriate and Ongoing  - Pt will demonstrate reduced pain and improved functional outcomes as reported on the Oswestry Disability Index by reaching a score of 3 or less in order to demonstrate subjective improvement in pt's condition.   Appropriate and Ongoing  - Pt will demonstrate independence with the HEP at discharge. Appropriate and Ongoing  - Pt will be able to bend and lift up to 25# with proper posture. (patient goal) Appropriate and Ongoing     Plan     Continue with established Plan of Care towards established PT goals.     Therapist: Bart Farr PTA  03/12/2024

## 2024-03-12 ENCOUNTER — CLINICAL SUPPORT (OUTPATIENT)
Dept: REHABILITATION | Facility: OTHER | Age: 44
End: 2024-03-12
Payer: COMMERCIAL

## 2024-03-12 DIAGNOSIS — R68.89 DECREASED ACTIVITY TOLERANCE: Primary | ICD-10-CM

## 2024-03-12 DIAGNOSIS — R26.89 DECREASED BACK MOBILITY: ICD-10-CM

## 2024-03-12 DIAGNOSIS — R29.898 DECREASED STRENGTH OF TRUNK AND BACK: ICD-10-CM

## 2024-03-12 PROCEDURE — 97750 PHYSICAL PERFORMANCE TEST: CPT | Mod: 32

## 2024-03-20 ENCOUNTER — OFFICE VISIT (OUTPATIENT)
Dept: OBSTETRICS AND GYNECOLOGY | Facility: CLINIC | Age: 44
End: 2024-03-20
Payer: COMMERCIAL

## 2024-03-20 ENCOUNTER — CLINICAL SUPPORT (OUTPATIENT)
Dept: REHABILITATION | Facility: OTHER | Age: 44
End: 2024-03-20
Payer: COMMERCIAL

## 2024-03-20 VITALS
SYSTOLIC BLOOD PRESSURE: 93 MMHG | BODY MASS INDEX: 25.2 KG/M2 | WEIGHT: 146.81 LBS | DIASTOLIC BLOOD PRESSURE: 65 MMHG | HEART RATE: 76 BPM

## 2024-03-20 DIAGNOSIS — F43.21 GRIEF: ICD-10-CM

## 2024-03-20 DIAGNOSIS — R68.89 DECREASED ACTIVITY TOLERANCE: Primary | ICD-10-CM

## 2024-03-20 DIAGNOSIS — G47.00 INSOMNIA, UNSPECIFIED TYPE: ICD-10-CM

## 2024-03-20 DIAGNOSIS — R29.898 DECREASED STRENGTH OF TRUNK AND BACK: ICD-10-CM

## 2024-03-20 DIAGNOSIS — Z12.31 OTHER SCREENING MAMMOGRAM: ICD-10-CM

## 2024-03-20 DIAGNOSIS — R26.89 DECREASED BACK MOBILITY: ICD-10-CM

## 2024-03-20 DIAGNOSIS — N92.0 MENORRHAGIA WITH REGULAR CYCLE: Primary | ICD-10-CM

## 2024-03-20 DIAGNOSIS — A60.00 GENITAL HERPES SIMPLEX, UNSPECIFIED SITE: ICD-10-CM

## 2024-03-20 PROCEDURE — 99203 OFFICE O/P NEW LOW 30 MIN: CPT | Mod: S$GLB,,, | Performed by: NURSE PRACTITIONER

## 2024-03-20 PROCEDURE — 3074F SYST BP LT 130 MM HG: CPT | Mod: CPTII,S$GLB,, | Performed by: NURSE PRACTITIONER

## 2024-03-20 PROCEDURE — 97750 PHYSICAL PERFORMANCE TEST: CPT | Mod: 32

## 2024-03-20 PROCEDURE — 99999 PR PBB SHADOW E&M-EST. PATIENT-LVL III: CPT | Mod: PBBFAC,,, | Performed by: NURSE PRACTITIONER

## 2024-03-20 PROCEDURE — 3078F DIAST BP <80 MM HG: CPT | Mod: CPTII,S$GLB,, | Performed by: NURSE PRACTITIONER

## 2024-03-20 PROCEDURE — 1159F MED LIST DOCD IN RCRD: CPT | Mod: CPTII,S$GLB,, | Performed by: NURSE PRACTITIONER

## 2024-03-20 PROCEDURE — 1160F RVW MEDS BY RX/DR IN RCRD: CPT | Mod: CPTII,S$GLB,, | Performed by: NURSE PRACTITIONER

## 2024-03-20 PROCEDURE — 3008F BODY MASS INDEX DOCD: CPT | Mod: CPTII,S$GLB,, | Performed by: NURSE PRACTITIONER

## 2024-03-20 RX ORDER — TRAZODONE HYDROCHLORIDE 100 MG/1
100 TABLET ORAL NIGHTLY PRN
Qty: 30 TABLET | Refills: 11 | Status: SHIPPED | OUTPATIENT
Start: 2024-03-20

## 2024-03-20 RX ORDER — VALACYCLOVIR HYDROCHLORIDE 1 G/1
1000 TABLET, FILM COATED ORAL EVERY 12 HOURS
Qty: 60 TABLET | Refills: 11 | Status: SHIPPED | OUTPATIENT
Start: 2024-03-20 | End: 2025-03-20

## 2024-03-20 RX ORDER — BUPROPION HYDROCHLORIDE 150 MG/1
150 TABLET ORAL EVERY 12 HOURS
Qty: 60 TABLET | Refills: 11 | Status: SHIPPED | OUTPATIENT
Start: 2024-03-20

## 2024-03-20 RX ORDER — TRANEXAMIC ACID 650 MG/1
1300 TABLET ORAL 3 TIMES DAILY
Qty: 30 TABLET | Refills: 5 | Status: SHIPPED | OUTPATIENT
Start: 2024-03-20

## 2024-03-20 RX ORDER — ACYCLOVIR 50 MG/G
OINTMENT TOPICAL
Qty: 30 G | Refills: 0 | Status: SHIPPED | OUTPATIENT
Start: 2024-03-20

## 2024-03-20 NOTE — PROGRESS NOTES
[facility]  Physical Therapy Treatment Note     Name: Sarah Padron  Clinic Number: [id number]    Therapy Diagnosis:   Encounter Diagnoses   Name Primary?    Decreased activity tolerance Yes    Decreased strength of trunk and back     Decreased back mobility          Physician: Any Squires PA-C    Visit Date: 3/20/2024    Physician Orders: PT Eval and Treat  Medical Diagnosis from Referral:   1. Decreased activity tolerance    2. Chronic midline low back pain without sciatica    3. Decreased strength of trunk and back    4. Decreased back mobility       Evaluation Date: 2/14/2024  Authorization Period Expiration: 2/1/2025  Plan of Care Expiration: 5/14/2024  Reassessment Due: 3/14/2024  Visit #/Visits authorized: 6/20   MedX Testing:MedX testing visit 2    PTA Visit #: 1/5     Time In: 11:10 AM  Time Out: 12:10 AM   Total Billable Time: 55 minutes  INSURANCE and OUTCOMES: Program Benefit Group with Lumbar Outcomes (Oswestry and AQoL) 1/3     Precautions: standard     Pattern of pain determined: 1 PEP  Subjective   Sarah Padron reports currently no pain at the low back and no change with pain related to work activites      Patient reports tolerating previous visit fair  Patient reports their pain to be 1/10 on a 0-10 scale with 0 being no pain and 10 being the worst pain imaginable.  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   Objective    Lumbar  Isometric Testing on Med X equipment: Testing administered by PT    Test Initial Baseline Midpoint Final   Date 2/21/2024     ROM 0-54 deg     Max Peak Torque 84      Min Peak Torque 45      Flex/Ext Ratio 1.8:1     % below normative data 56     % gain from initial test Not available visit 1           2/14/2024     8:49 AM   Oswestry Questionnaire Review   Pain Intensity 1  "  Personal Care (Washing, Dressing) 0   Lifting 4   Walking 0   Sitting 1   Standing 1   Sleeping 0   Social Life 0   Traveling 1   Employment/Homemaking 2   Score 10           Treatment     Sarah received the treatments listed below:      Sarah participated in neuromuscular re-education activities to improve balance, coordination, proprioception, motor control and/or posture for 20 minutes. The following activities were included:    [x] Posterior pelvic tilt + marching x10 with ball  [] TrA brace BKFO RTB x10  [] SOC 2 x 10  [] TrA brace + SLR x10  [x] Sl clamshell w/GTB x15  [x] Standing pallof press x15 GTB  [x] Side step monster walk BTB at knees  [] Hip hinge x 10 x 10   [] Hip hinge with lat pulldown GTB x 10  []   []           3/20/2024    11:44 AM   [facility]   Visit Number 6   VAS Pain Rating 0   Treadmill Time (in min.) 5 min   Extension in Lying 10   Lumbar Weight 54 lbs   Repetitions 20   Rating of Perceived Exertion 4   Ice - Z Lie (in min.) 5           Sarah participated in therapeutic exercises to develop strength, endurance, ROM, flexibility, posture, and core stabilization for 25 minutes including:    [x] Treadmill 5'  [x] Extension in Lying x10  [x] Prone hip extension 2x10  []   []   [x] Bridge w/GTB x15,3"  [] Double knee to chest 10x  [x] Supine Lower trunk rotation 10x   [x] Supine Lower trunk rotation 10x with ball    Peripheral muscle strengthening which included one set of 15-20 repetitions at a slow and controlled 10-13 second per rep pace focused on strengthening supporting musculature in order to improve body mechanics and functional mobility. Patient and therapist focused on proper form during treatment to ensure optimal strengthening of each targeted muscle group.  Machines utilized included:Torso rotation, Leg Ext, Leg Curl, Chest Press, and Rowing  To be added next visit:Triceps, Biceps, Hip Abd, and Leg Press      Sarah participated in dynamic functional " "therapeutic activities to improve functional performance and simulate household and community activities for 00  minutes. The following activities were included:          Sarah received manual therapy techniques for 00  minutes. The following activities were included:      Pt given cold pack for 5 minutes to low back in supine.  Patient Education and Home Exercises   Home exercises include:  Extension in Lying   Extension in standing   Double knee to chest   Posterior pelvic tilt     Cardio program (V5): -  Lifting education (V11): -  Posture/Lumbar roll: ABD  Fridge Magnet Discharge handout (date given): -  Equipment at home/gym membership: no      Education provided:   - Patient received education in benefits of progressing mobility and strengthening gradually  - Pt educated in proper body mechanics and lifting techniques.  - Discussed exertion scale, slow progressive resistance protocol to promote safe and healthy strengthening of supportive musculature  by performing 15-20 reps, 7 sec per rep, and increasing weight by 5 % at 20 reps only if there ex done safely, slowly, using correct musculature, exertion and no pain.  Patient expressed understanding.  -Pt educated on strategies to safely perform examination and exercise using "Stop Light" analogy to describe how to avoid or stop irritating motions, proceed with caution with some movements, and progress positive exercises.     Written Home Exercises Provided: Patient instructed to cont prior HEP.  Exercises were reviewed and Sarah was able to demonstrate them prior to the end of the session.  Sarah demonstrated good  understanding of the education provided.     See EMR under Patient Instructions for exercises provided prior visit.    Assessment     Sarah presents to theapy with no complaints of back pain but continue to have disomcofrt with patient transfers. Added exercises this visit to continue to address trunk/back stabiioty in prone and supine. " Pt reports to session with low levels of pain though pt reports very high levels of pain when performing work related duties. Initiated review of lifting mechanics and added lat pull down with hip hinge to start strengthening the posterior chain to address work related pain. Pt required mod verbal cues for hip hinge vs squatting. Increased ROM in flexion on the lumbar MedX and increased resistance to 54 ft lbs with 20 reps performed at an exertion rating of 4/10. No issues with Peripherals, will continue to progress as toleated      Patient is making good progress towards established goals.  Pt will continue to benefit from skilled outpatient physical therapy to address the deficits stated in the impairment chart, provide pt/family education and to maximize pt's level of independence in the home and community environment.     Anticipated Barriers for therapy: none    Pt's spiritual, cultural and educational needs considered and pt agreeable to plan of care and goals as stated below:   GOALS: Pt is in agreement with the following goals.     Short term goals:  6 weeks or 10 visits   - Pt will demonstrate increased lumbar ROM by at least 3 degrees from the initial ROM value with improvements noted in functional ROM and ability to perform ADLs. Met 3/20/2024  - Pt will demonstrate increased MedX average isometric strength value by 15% from initial test resulting in improved ability to perform bending, lifting, and carrying activities safely, confidently. Appropriate and Ongoing  - Pt will report a reduction in worst pain score by 1-2 points for improved tolerance for first thing in AM. Appropriate and Ongoing  - Pt able to perform HEP correctly with minimal cueing or supervision from therapist to encourage independent management of symptoms. Appropriate and Ongoing     Long term goals: 10 weeks or 20 visits   - Pt will demonstrate increased lumbar ROM by at least 6 degrees from initial ROM value, resulting in improved  ability to perform functional forward bending while standing and sitting. Appropriate and Ongoing  - Pt will demonstrate increased MedX average isometric strength value by 30% from initial test resulting in improved ability to perform bending, lifting, and carrying activities safely and confidently. Appropriate and Ongoing  - Pt to demonstrate ability to independently control and reduce their pain through posture positioning and mechanical movements throughout a typical day. Appropriate and Ongoing  - Pt will demonstrate reduced pain and improved functional outcomes as reported on the Oswestry Disability Index by reaching a score of 3 or less in order to demonstrate subjective improvement in pt's condition.   Appropriate and Ongoing  - Pt will demonstrate independence with the HEP at discharge. Appropriate and Ongoing  - Pt will be able to bend and lift up to 25# with proper posture. (patient goal) Appropriate and Ongoing     Plan     Continue with established Plan of Care towards established PT goals.     Therapist: Ana Reynaga, PT  03/20/2024

## 2024-03-20 NOTE — PROGRESS NOTES
Chief Complaint: Establish care      HPI:      Sarah is a 44 y.o.  who presents today to establish care. She was previously being seen by [facility] but recently started working at [employer] so needs to establish care with a provider here.     She has a history of genital HSV. Has had 4 outbreaks in the past 1 year. Takes Valtrex 1 gram daily as suppressive therapy and will increase to twice a day with outbreaks. She also uses acyclovir ointment with outbreaks.     She was also started on Lysteda to take as needed for heavy periods. She takes it 3 times a day for the first 2 days of her period.   Currently - Patient's last menstrual period was 2024. Periods every 28 days, last 4 days total. Her flow and cramping have improved since starting the Lysteda.     She is also asking for refills on her Wellbutrin and Trazodone. She was initially taking the Wellbutrin 150 mg SR twice a day but for the past few months she has been taking the 150 mg XL twice a day and she feels like her mood is stable on this dose.   Her   and it is currently around the 1 year anniversary. She does go to weekly group therapy sessions as well as monthly individual therapy sessions. Her sleep has improved with Trazodone however she does have vivid dreams occasionally which impact her sleep.     Sarah is not currently sexually active.   She endorses a history of abnormal pap tests and a colposcopy 8-10 years ago. She was getting yearly pap tests since this which have been normal.  Previous Mammogram: BiRads: Never.     Physical Exam:      PHYSICAL EXAM:  BP 93/65   Pulse 76   Wt 66.6 kg (146 lb 13.2 oz)   LMP 2024   BMI 25.20 kg/m²   Body mass index is 25.2 kg/m².     APPEARANCE: Well nourished, well developed, in no acute distress.      Assessment/Plan:     Menorrhagia with regular cycle  -     tranexamic acid (LYSTEDA) 650 mg tablet; Take 2 tablets (1,300 mg total) by mouth 3 (three) times daily. During  menses  Dispense: 30 tablet; Refill: 5    Genital herpes simplex, unspecified site  -     acyclovir 5% (ZOVIRAX) 5 % ointment; Apply topically 5 (five) times daily.  Dispense: 30 g; Refill: 0  -     valACYclovir (VALTREX) 1000 MG tablet; Take 1 tablet (1,000 mg total) by mouth every 12 (twelve) hours.  Dispense: 60 tablet; Refill: 11    Grief  -     buPROPion (WELLBUTRIN XL) 150 MG TB24 tablet; Take 1 tablet (150 mg total) by mouth every 12 (twelve) hours.  Dispense: 60 tablet; Refill: 11  -     traZODone (DESYREL) 100 MG tablet; Take 1 tablet (100 mg total) by mouth nightly as needed for Insomnia.  Dispense: 30 tablet; Refill: 11    Insomnia, unspecified type  -     buPROPion (WELLBUTRIN XL) 150 MG TB24 tablet; Take 1 tablet (150 mg total) by mouth every 12 (twelve) hours.  Dispense: 60 tablet; Refill: 11  -     traZODone (DESYREL) 100 MG tablet; Take 1 tablet (100 mg total) by mouth nightly as needed for Insomnia.  Dispense: 30 tablet; Refill: 11    Other screening mammogram  -     Mammo Digital Screening Bilat w/ [facility]; Future; Expected date: 03/20/2024      PLAN:    Medications refills.   Encouraged patient to continue to follow up with individual and group therapy sessions.   Screening mammogram ordered.     Follow up for WWE/pap.

## 2024-03-26 ENCOUNTER — CLINICAL SUPPORT (OUTPATIENT)
Dept: REHABILITATION | Facility: OTHER | Age: 44
End: 2024-03-26
Payer: COMMERCIAL

## 2024-03-26 DIAGNOSIS — R26.89 DECREASED BACK MOBILITY: ICD-10-CM

## 2024-03-26 DIAGNOSIS — R68.89 DECREASED ACTIVITY TOLERANCE: Primary | ICD-10-CM

## 2024-03-26 DIAGNOSIS — R29.898 DECREASED STRENGTH OF TRUNK AND BACK: ICD-10-CM

## 2024-03-26 PROCEDURE — 97750 PHYSICAL PERFORMANCE TEST: CPT | Mod: 32

## 2024-03-26 NOTE — PROGRESS NOTES
[facility]  Physical Therapy Treatment Note     Name: Sarah Padron  Clinic Number: [id number]    Therapy Diagnosis:   Encounter Diagnoses   Name Primary?    Decreased activity tolerance Yes    Decreased strength of trunk and back     Decreased back mobility          Physician: Any Squires PA-C    Visit Date: 3/26/2024    Physician Orders: PT Eval and Treat  Medical Diagnosis from Referral:   1. Decreased activity tolerance    2. Chronic midline low back pain without sciatica    3. Decreased strength of trunk and back    4. Decreased back mobility       Evaluation Date: 2/14/2024  Authorization Period Expiration: 2/1/2025  Plan of Care Expiration: 5/14/2024  Reassessment Due: 3/14/2024  Visit #/Visits authorized: 6/20   MedX Testing:MedX testing visit 2    PTA Visit #: 1/5     Time In: 3:00  Time Out: 3:50   Total Billable Time: 50 minutes  INSURANCE and OUTCOMES: Program Benefit Group with Lumbar Outcomes (Oswestry and AQoL) 1/3     Precautions: standard     Pattern of pain determined: 1 PEP  Subjective   Sarah Padron reports the back is feeling ok right now, work has not been as bad, she is getting used to pivoting at the hips for transfers      Patient reports tolerating previous visit fair  Patient reports their pain to be 1/10 on a 0-10 scale with 0 being no pain and 10 being the worst pain imaginable.  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   Objective    Lumbar  Isometric Testing on Med X equipment: Testing administered by PT    Test Initial Baseline Midpoint Final   Date 2/21/2024     ROM 0-54 deg     Max Peak Torque 84      Min Peak Torque 45      Flex/Ext Ratio 1.8:1     % below normative data 56     % gain from initial test Not available visit 1           2/14/2024     8:49 AM   Oswestry Questionnaire  "Review   Pain Intensity 1   Personal Care (Washing, Dressing) 0   Lifting 4   Walking 0   Sitting 1   Standing 1   Sleeping 0   Social Life 0   Traveling 1   Employment/Homemaking 2   Score 10           Treatment     Sarah received the treatments listed below:      Sarah participated in neuromuscular re-education activities to improve balance, coordination, proprioception, motor control and/or posture for 20 minutes. The following activities were included:    [x] Posterior pelvic tilt + marching x10 with ball  [] TrA brace BKFO RTB x10  [] SOC 2 x 10  [] TrA brace + SLR x10  [x] Sl clamshell w/GTB x15  [x] Standing pallof press x15 GTB  [x] Side step monster walk BTB at knees  [] Hip hinge x 10 x 10   [x] Hip hinge with lat pulldown BluTB x 10  [x] Quadruped hip extension 2# AW  [x] Cat Cow x10          3/26/2024     3:30 PM   [facility]   Visit Number 7   VAS Pain Rating 0   Treadmill Time (in min.) 5 min   Extension in Lying 10   Lumbar Weight 58 lbs   Repetitions 20   Rating of Perceived Exertion 3   Ice - Z Lie (in min.) 0             Sarah participated in therapeutic exercises to develop strength, endurance, ROM, flexibility, posture, and core stabilization for 25 minutes including:    [x] Treadmill 5'    [x] Extension in Lying x10  [x] Prone hip extension 2x10 2# AW  []   [x] Bridge w/BTB x15,3"  [x] Bridge w/ kick x10  [] Double knee to chest 10x  [] Supine Lower trunk rotation 10x   [x] Supine Lower trunk rotation 10x with ball    Peripheral muscle strengthening which included one set of 15-20 repetitions at a slow and controlled 10-13 second per rep pace focused on strengthening supporting musculature in order to improve body mechanics and functional mobility. Patient and therapist focused on proper form during treatment to ensure optimal strengthening of each targeted muscle group.  Machines utilized included:Torso rotation, Leg Ext, Leg Curl, Chest Press, and Rowing  To be added next " "visit:Triceps, Biceps, Hip Abd, and Leg Press      Sarah participated in dynamic functional therapeutic activities to improve functional performance and simulate household and community activities for 00  minutes. The following activities were included:          Sarah received manual therapy techniques for 00  minutes. The following activities were included:      Pt given cold pack for 5 minutes to low back in supine.  Patient Education and Home Exercises   Home exercises include:  Extension in Lying   Extension in standing   Double knee to chest   Posterior pelvic tilt     Cardio program (V5): -  Lifting education (V11): -  Posture/Lumbar roll: ABD  Fridge Magnet Discharge handout (date given): -  Equipment at home/gym membership: no      Education provided:   - Patient received education in benefits of progressing mobility and strengthening gradually  - Pt educated in proper body mechanics and lifting techniques.  - Discussed exertion scale, slow progressive resistance protocol to promote safe and healthy strengthening of supportive musculature  by performing 15-20 reps, 7 sec per rep, and increasing weight by 5 % at 20 reps only if there ex done safely, slowly, using correct musculature, exertion and no pain.  Patient expressed understanding.  -Pt educated on strategies to safely perform examination and exercise using "Stop Light" analogy to describe how to avoid or stop irritating motions, proceed with caution with some movements, and progress positive exercises.     Written Home Exercises Provided: Patient instructed to cont prior HEP.  Exercises were reviewed and Sarah was able to demonstrate them prior to the end of the session.  Sarah demonstrated good  understanding of the education provided.     See EMR under Patient Instructions for exercises provided prior visit.    Assessment     Sarah presents to theapy with no complaints of back pain and progressing well with work related lifting, doing " the hip hinge that was taught last visit has helped. Resumed exercises and added resistance as necessary for progression as well as additional strengthening exercises. Increased resistance to 58 ft lbs, she was able to complete 20 repetitions at 3/10 RPE and plan to progress as tolerated.       Patient is making good progress towards established goals.  Pt will continue to benefit from skilled outpatient physical therapy to address the deficits stated in the impairment chart, provide pt/family education and to maximize pt's level of independence in the home and community environment.     Anticipated Barriers for therapy: none    Pt's spiritual, cultural and educational needs considered and pt agreeable to plan of care and goals as stated below:   GOALS: Pt is in agreement with the following goals.     Short term goals:  6 weeks or 10 visits   - Pt will demonstrate increased lumbar ROM by at least 3 degrees from the initial ROM value with improvements noted in functional ROM and ability to perform ADLs. Met 3/20/2024  - Pt will demonstrate increased MedX average isometric strength value by 15% from initial test resulting in improved ability to perform bending, lifting, and carrying activities safely, confidently. Appropriate and Ongoing  - Pt will report a reduction in worst pain score by 1-2 points for improved tolerance for first thing in AM. Appropriate and Ongoing  - Pt able to perform HEP correctly with minimal cueing or supervision from therapist to encourage independent management of symptoms. Appropriate and Ongoing     Long term goals: 10 weeks or 20 visits   - Pt will demonstrate increased lumbar ROM by at least 6 degrees from initial ROM value, resulting in improved ability to perform functional forward bending while standing and sitting. Appropriate and Ongoing  - Pt will demonstrate increased MedX average isometric strength value by 30% from initial test resulting in improved ability to perform bending,  lifting, and carrying activities safely and confidently. Appropriate and Ongoing  - Pt to demonstrate ability to independently control and reduce their pain through posture positioning and mechanical movements throughout a typical day. Appropriate and Ongoing  - Pt will demonstrate reduced pain and improved functional outcomes as reported on the Oswestry Disability Index by reaching a score of 3 or less in order to demonstrate subjective improvement in pt's condition.   Appropriate and Ongoing  - Pt will demonstrate independence with the HEP at discharge. Appropriate and Ongoing  - Pt will be able to bend and lift up to 25# with proper posture. (patient goal) Appropriate and Ongoing     Plan     Continue with established Plan of Care towards established PT goals.     Therapist: Ana Reynaga, PT  03/26/2024

## 2024-04-02 ENCOUNTER — CLINICAL SUPPORT (OUTPATIENT)
Dept: REHABILITATION | Facility: OTHER | Age: 44
End: 2024-04-02
Payer: COMMERCIAL

## 2024-04-02 DIAGNOSIS — R68.89 DECREASED ACTIVITY TOLERANCE: Primary | ICD-10-CM

## 2024-04-02 DIAGNOSIS — R29.898 DECREASED STRENGTH OF TRUNK AND BACK: ICD-10-CM

## 2024-04-02 DIAGNOSIS — R26.89 DECREASED BACK MOBILITY: ICD-10-CM

## 2024-04-02 PROCEDURE — 97750 PHYSICAL PERFORMANCE TEST: CPT | Mod: 32

## 2024-04-02 NOTE — PROGRESS NOTES
[facility]  Physical Therapy Treatment Note     Name: Sarah Padron  Clinic Number: [id number]    Therapy Diagnosis:   Encounter Diagnoses   Name Primary?    Decreased activity tolerance Yes    Decreased strength of trunk and back     Decreased back mobility            Physician: Any Squires PA-C    Visit Date: 4/2/2024    Physician Orders: PT Eval and Treat  Medical Diagnosis from Referral:   1. Decreased activity tolerance    2. Chronic midline low back pain without sciatica    3. Decreased strength of trunk and back    4. Decreased back mobility       Evaluation Date: 2/14/2024  Authorization Period Expiration: 2/1/2025  Plan of Care Expiration: 5/14/2024  Reassessment Due: 3/14/2024  Visit #/Visits authorized: 6/20   MedX Testing:MedX testing visit 2    PTA Visit #: 1/5     Time In: 2:00  Time Out: 3:00   Total Billable Time: 50 minutes  INSURANCE and OUTCOMES: Program Benefit Group with Lumbar Outcomes (Oswestry and AQoL) 1/3     Precautions: standard     Pattern of pain determined: 1 PEP  Subjective   Sarah Padron reports the back is feeling ok right now, work has not been as bad, she is getting used to pivoting at the hips for transfers      Patient reports tolerating previous visit fair  Patient reports their pain to be 1/10 on a 0-10 scale with 0 being no pain and 10 being the worst pain imaginable.  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   Objective    Lumbar  Isometric Testing on Med X equipment: Testing administered by PT    Test Initial Baseline Midpoint Final   Date 2/21/2024     ROM 0-54 deg     Max Peak Torque 84      Min Peak Torque 45      Flex/Ext Ratio 1.8:1     % below normative data 56     % gain from initial test Not available visit 1           2/14/2024     8:49 AM   Oswestry Questionnaire  "Review   Pain Intensity 1   Personal Care (Washing, Dressing) 0   Lifting 4   Walking 0   Sitting 1   Standing 1   Sleeping 0   Social Life 0   Traveling 1   Employment/Homemaking 2   Score 10           Treatment     Sarah received the treatments listed below:      Sarah participated in neuromuscular re-education activities to improve balance, coordination, proprioception, motor control and/or posture for 20 minutes. The following activities were included:    [x] Posterior pelvic tilt + marching x10 with ball    [x] Sl clamshell w/GTB x15  [x] Standing pallof press x15 GTB  [x] Side step monster walk BTB at knees    [x] Hip hinge with lat pulldown BluTB x 10  [x] Quadruped hip extension 2# AW  [x] Cat Cow x10  [] Hip hinge x 10 x 10   [] TrA brace BKFO RTB x10  [] SOC 2 x 10  [] TrA brace + SLR x10        4/2/2024     2:29 PM   [facility]   Visit Number 8   VAS Pain Rating 0   Treadmill Time (in min.) 5 min   Extension in Lying 10   Lumbar Weight 63 lbs   Repetitions 17   Rating of Perceived Exertion 3   Ice - Z Lie (in min.) 0             Sarah participated in therapeutic exercises to develop strength, endurance, ROM, flexibility, posture, and core stabilization for 25 minutes including:    [x] Treadmill 5'    [x] Extension in Lying x10  [x] Prone hip extension 2x10 2# AW  []   [x] Bridge x15,3"  [x] Bridge w/ kick x10  [] Double knee to chest 10x  [] Supine Lower trunk rotation 10x   [x] Supine Lower trunk rotation 15x with ball    Peripheral muscle strengthening which included one set of 15-20 repetitions at a slow and controlled 10-13 second per rep pace focused on strengthening supporting musculature in order to improve body mechanics and functional mobility. Patient and therapist focused on proper form during treatment to ensure optimal strengthening of each targeted muscle group.  Machines utilized included:Torso rotation, Leg Ext, Leg Curl, Chest Press, and Rowing  To be added next " "visit:Triceps, Biceps, Hip Abd, and Leg Press      Sarah participated in dynamic functional therapeutic activities to improve functional performance and simulate household and community activities for 00  minutes. The following activities were included:          Sarah received manual therapy techniques for 00  minutes. The following activities were included:      Pt given cold pack for 5 minutes to low back in supine.  Patient Education and Home Exercises   Home exercises include:  Extension in Lying   Extension in standing   Double knee to chest   Posterior pelvic tilt     Cardio program (V5): -  Lifting education (V11): -  Posture/Lumbar roll: ABD  Fridge Magnet Discharge handout (date given): -  Equipment at home/gym membership: no      Education provided:   - Patient received education in benefits of progressing mobility and strengthening gradually  - Pt educated in proper body mechanics and lifting techniques.  - Discussed exertion scale, slow progressive resistance protocol to promote safe and healthy strengthening of supportive musculature  by performing 15-20 reps, 7 sec per rep, and increasing weight by 5 % at 20 reps only if there ex done safely, slowly, using correct musculature, exertion and no pain.  Patient expressed understanding.  -Pt educated on strategies to safely perform examination and exercise using "Stop Light" analogy to describe how to avoid or stop irritating motions, proceed with caution with some movements, and progress positive exercises.     Written Home Exercises Provided: Patient instructed to cont prior HEP.  Exercises were reviewed and Sarah was able to demonstrate them prior to the end of the session.  Sarah demonstrated good  understanding of the education provided.     See EMR under Patient Instructions for exercises provided prior visit.    Assessment     Sarah presents to theapy with no complaints of back pain and progressing well with work related lifting, doing " the hip hinge that was taught last visit has helped. Resumed exercises and added resistance as necessary for progression as well as additional strengthening exercises. Increased resistance to 63 ft lbs, she was able to complete 17 repetitions at 3/10 RPE and plan to progress as tolerated.       Patient is making good progress towards established goals.  Pt will continue to benefit from skilled outpatient physical therapy to address the deficits stated in the impairment chart, provide pt/family education and to maximize pt's level of independence in the home and community environment.     Anticipated Barriers for therapy: none    Pt's spiritual, cultural and educational needs considered and pt agreeable to plan of care and goals as stated below:   GOALS: Pt is in agreement with the following goals.     Short term goals:  6 weeks or 10 visits   - Pt will demonstrate increased lumbar ROM by at least 3 degrees from the initial ROM value with improvements noted in functional ROM and ability to perform ADLs. Met 3/20/2024  - Pt will demonstrate increased MedX average isometric strength value by 15% from initial test resulting in improved ability to perform bending, lifting, and carrying activities safely, confidently. Appropriate and Ongoing  - Pt will report a reduction in worst pain score by 1-2 points for improved tolerance for first thing in AM. Appropriate and Ongoing  - Pt able to perform HEP correctly with minimal cueing or supervision from therapist to encourage independent management of symptoms. Appropriate and Ongoing     Long term goals: 10 weeks or 20 visits   - Pt will demonstrate increased lumbar ROM by at least 6 degrees from initial ROM value, resulting in improved ability to perform functional forward bending while standing and sitting. Appropriate and Ongoing  - Pt will demonstrate increased MedX average isometric strength value by 30% from initial test resulting in improved ability to perform bending,  lifting, and carrying activities safely and confidently. Appropriate and Ongoing  - Pt to demonstrate ability to independently control and reduce their pain through posture positioning and mechanical movements throughout a typical day. Appropriate and Ongoing  - Pt will demonstrate reduced pain and improved functional outcomes as reported on the Oswestry Disability Index by reaching a score of 3 or less in order to demonstrate subjective improvement in pt's condition.   Appropriate and Ongoing  - Pt will demonstrate independence with the HEP at discharge. Appropriate and Ongoing  - Pt will be able to bend and lift up to 25# with proper posture. (patient goal) Appropriate and Ongoing     Plan     Continue with established Plan of Care towards established PT goals.     Therapist: Viji Levy, PTA  04/02/2024

## 2024-04-10 ENCOUNTER — PATIENT MESSAGE (OUTPATIENT)
Dept: OBSTETRICS AND GYNECOLOGY | Facility: CLINIC | Age: 44
End: 2024-04-10
Payer: COMMERCIAL

## 2024-04-11 ENCOUNTER — CLINICAL SUPPORT (OUTPATIENT)
Dept: REHABILITATION | Facility: OTHER | Age: 44
End: 2024-04-11
Payer: COMMERCIAL

## 2024-04-11 DIAGNOSIS — R29.898 DECREASED STRENGTH OF TRUNK AND BACK: ICD-10-CM

## 2024-04-11 DIAGNOSIS — R26.89 DECREASED BACK MOBILITY: ICD-10-CM

## 2024-04-11 DIAGNOSIS — R68.89 DECREASED ACTIVITY TOLERANCE: Primary | ICD-10-CM

## 2024-04-11 PROCEDURE — 97750 PHYSICAL PERFORMANCE TEST: CPT | Mod: 32

## 2024-04-11 NOTE — PROGRESS NOTES
[facility]  Physical Therapy Treatment Note     Name: Sarah Padron  Clinic Number: [id number]    Therapy Diagnosis:   Encounter Diagnoses   Name Primary?    Decreased activity tolerance Yes    Decreased strength of trunk and back     Decreased back mobility          Physician: Any Squires PA-C    Visit Date: 4/11/2024    Physician Orders: PT Eval and Treat  Medical Diagnosis from Referral:   1. Decreased activity tolerance    2. Chronic midline low back pain without sciatica    3. Decreased strength of trunk and back    4. Decreased back mobility       Evaluation Date: 2/14/2024  Authorization Period Expiration: 2/1/2025  Plan of Care Expiration: 5/14/2024  Reassessment Due: 3/14/2024  Visit #/Visits authorized: 9/20 (Requested patient be transferred to PT nv for mid-point testing)  MedX Testing:MedX testing visit 2    PTA Visit #: 1/5     Time In: 9:00  Time Out: 9:55  Total Billable Time: 55 minutes  INSURANCE and OUTCOMES: Program Benefit Group with Lumbar Outcomes (Oswestry and AQoL) 1/3     Precautions: standard     Pattern of pain determined: 1 PEP  Subjective   Sarah Padron reports the hip hinge continues to be helpful for decreasing with back pain for transfers at work.       Patient reports tolerating previous visit fair  Patient reports their pain to be 0/10 on a 0-10 scale with 0 being no pain and 10 being the worst pain imaginable.  Pain Location:  bilateral low back      Occupation: X ray tech at [employer]   Leisure: reading, exercise, going to park     Pts goals: To be able to get dressed without having to sit down.   Objective    Lumbar  Isometric Testing on Med X equipment: Testing administered by PT    Test Initial Baseline Midpoint Final   Date 2/21/2024     ROM 0-54 deg     Max Peak Torque 84      Min Peak Torque 45      Flex/Ext Ratio 1.8:1     % below normative data 56     % gain from initial test Not available visit 1           2/14/2024  "    8:49 AM   Oswestry Questionnaire Review   Pain Intensity 1   Personal Care (Washing, Dressing) 0   Lifting 4   Walking 0   Sitting 1   Standing 1   Sleeping 0   Social Life 0   Traveling 1   Employment/Homemaking 2   Score 10           Treatment     Sarah received the treatments listed below:      Sarah participated in neuromuscular re-education activities to improve balance, coordination, proprioception, motor control and/or posture for 20 minutes. The following activities were included:    [] Posterior pelvic tilt + marching x10 with ball  [] Sl clamshell w/GTB x15  [x] Standing pallof press +tandem stance x15 GTB  [x] Side steps Blue TB around ankles 2x20'  [] Hip hinge with lat pulldown BluTB x 10  [x] Quadruped donkey kicks 2#  x10  [x] Cat Cow x10  [] Hip hinge x 10 x 10   [] TrA brace BKFO RTB x10  [] SOC 2 x 10  [] TrA brace + SLR x10      Sarah participated in therapeutic exercises to develop strength, endurance, ROM, flexibility, posture, and core stabilization for 25 minutes including:    [x] Treadmill 5'  [x] Extension in Lying x10  [x] Bridge +black TB x15,3"  [x] Bridge w/ kick x10  [] Double knee to chest 10x  [] Supine Lower trunk rotation 10x   [] Supine Lower trunk rotation 15x with ball        4/11/2024     9:27 AM   [facility] - Short   Visit Number 9   VAS Pain Rating 0   Treadmill Time (in min.) 5 min   Extension in Lying 10   Lumbar Extension - Seat Pad 2   Lumbar Weight 63 lbs   Repetitions 20   Rating of Perceived Exertion 4        Peripheral muscle strengthening which included one set of 15-20 repetitions at a slow and controlled 10-13 second per rep pace focused on strengthening supporting musculature in order to improve body mechanics and functional mobility. Patient and therapist focused on proper form during treatment to ensure optimal strengthening of each targeted muscle group.  Machines utilized included:Torso rotation, Leg Ext, Leg Curl, Chest Press, and " "Rowing  To be added next visit:Triceps, Biceps, Hip Abd, and Leg Press      Sarah participated in dynamic functional therapeutic activities to improve functional performance and simulate household and community activities for 00  minutes. The following activities were included:          Sarah received manual therapy techniques for 00  minutes. The following activities were included:      Pt given cold pack for 5 minutes to low back in supine.  Patient Education and Home Exercises   Home exercises include:  Extension in Lying   Extension in standing   Double knee to chest   Posterior pelvic tilt   +Bridge w/band, side steps, bridge w/kick  Cardio program (V5): -  Lifting education (V11): -  Posture/Lumbar roll: ABD  Fridge Magnet Discharge handout (date given): -  Equipment at home/gym membership: no      Education provided:   - Patient received education in benefits of progressing mobility and strengthening gradually  - Pt educated in proper body mechanics and lifting techniques.  - Discussed exertion scale, slow progressive resistance protocol to promote safe and healthy strengthening of supportive musculature  by performing 15-20 reps, 7 sec per rep, and increasing weight by 5 % at 20 reps only if there ex done safely, slowly, using correct musculature, exertion and no pain.  Patient expressed understanding.  -Pt educated on strategies to safely perform examination and exercise using "Stop Light" analogy to describe how to avoid or stop irritating motions, proceed with caution with some movements, and progress positive exercises.     Written Home Exercises Provided: Patient instructed to cont prior HEP.  Exercises were reviewed and Sarah was able to demonstrate them prior to the end of the session.  Sarah demonstrated good  understanding of the education provided.     See EMR under Patient Instructions for exercises provided prior visit.    Assessment   Updated HEP to progress core/lumbopelvic " strength/stabilization, max cueing to maintain hip hinge with side steps but able to self correct and maintain proper form. Modified quad hip ext to donkey kicks to increase glute firing. Patient able to perform 20 reps on l/s medx machine with an RPE of 3/10. plan to progress as tolerated per HB protocol.      Patient is making good progress towards established goals.  Pt will continue to benefit from skilled outpatient physical therapy to address the deficits stated in the impairment chart, provide pt/family education and to maximize pt's level of independence in the home and community environment.     Anticipated Barriers for therapy: none    Pt's spiritual, cultural and educational needs considered and pt agreeable to plan of care and goals as stated below:   GOALS: Pt is in agreement with the following goals.     Short term goals:  6 weeks or 10 visits   - Pt will demonstrate increased lumbar ROM by at least 3 degrees from the initial ROM value with improvements noted in functional ROM and ability to perform ADLs. Met 3/20/2024  - Pt will demonstrate increased MedX average isometric strength value by 15% from initial test resulting in improved ability to perform bending, lifting, and carrying activities safely, confidently. Appropriate and Ongoing  - Pt will report a reduction in worst pain score by 1-2 points for improved tolerance for first thing in AM. Appropriate and Ongoing  - Pt able to perform HEP correctly with minimal cueing or supervision from therapist to encourage independent management of symptoms. Appropriate and Ongoing     Long term goals: 10 weeks or 20 visits   - Pt will demonstrate increased lumbar ROM by at least 6 degrees from initial ROM value, resulting in improved ability to perform functional forward bending while standing and sitting. Appropriate and Ongoing  - Pt will demonstrate increased MedX average isometric strength value by 30% from initial test resulting in improved ability to  perform bending, lifting, and carrying activities safely and confidently. Appropriate and Ongoing  - Pt to demonstrate ability to independently control and reduce their pain through posture positioning and mechanical movements throughout a typical day. Appropriate and Ongoing  - Pt will demonstrate reduced pain and improved functional outcomes as reported on the Oswestry Disability Index by reaching a score of 3 or less in order to demonstrate subjective improvement in pt's condition.   Appropriate and Ongoing  - Pt will demonstrate independence with the HEP at discharge. Appropriate and Ongoing  - Pt will be able to bend and lift up to 25# with proper posture. (patient goal) Appropriate and Ongoing     Plan     Continue with established Plan of Care towards established PT goals.     Therapist: Nirmala Mathias, EVAN  04/11/2024

## 2024-05-08 ENCOUNTER — HOSPITAL ENCOUNTER (OUTPATIENT)
Dept: RADIOLOGY | Facility: HOSPITAL | Age: 44
Discharge: HOME | End: 2024-05-08
Attending: NURSE PRACTITIONER
Payer: COMMERCIAL

## 2024-05-08 VITALS — HEIGHT: 64 IN | BODY MASS INDEX: 24.92 KG/M2 | WEIGHT: 146 LBS

## 2024-05-08 DIAGNOSIS — Z12.31 OTHER SCREENING MAMMOGRAM: ICD-10-CM

## 2024-05-08 PROCEDURE — 77067 SCR MAMMO BI INCL CAD: CPT | Mod: TC

## 2024-05-08 PROCEDURE — 77063 BREAST TOMOSYNTHESIS BI: CPT | Mod: TC

## 2024-05-08 PROCEDURE — 77067 SCR MAMMO BI INCL CAD: CPT | Mod: 26,,, | Performed by: RADIOLOGY

## 2024-05-08 PROCEDURE — 77063 BREAST TOMOSYNTHESIS BI: CPT | Mod: 26,,, | Performed by: RADIOLOGY

## 2024-05-13 ENCOUNTER — OFFICE VISIT (OUTPATIENT)
Dept: PLASTIC SURGERY | Facility: CLINIC | Age: 44
End: 2024-05-13

## 2024-05-13 ENCOUNTER — PATIENT MESSAGE (OUTPATIENT)
Dept: OBSTETRICS AND GYNECOLOGY | Facility: CLINIC | Age: 44
End: 2024-05-13
Payer: COMMERCIAL

## 2024-05-13 DIAGNOSIS — Z41.1 ENCOUNTER FOR COSMETIC PROCEDURE: Primary | ICD-10-CM

## 2024-05-13 PROCEDURE — 99499 UNLISTED E&M SERVICE: CPT | Mod: ,,, | Performed by: OTOLARYNGOLOGY

## 2024-05-13 RX ORDER — FLUCONAZOLE 150 MG/1
150 TABLET ORAL
Qty: 2 TABLET | Refills: 0 | Status: SHIPPED | OUTPATIENT
Start: 2024-05-13 | End: 2024-05-21

## 2024-05-13 NOTE — PROGRESS NOTES
Patient who is here today for consult for cosmetic myomodulator treatment.   Discussed benefits and risks of botulinum toxin injections, including headache, weakness/paralysis of muscles, asymmetry, eyebrow/lid drooping, pain, bruising, swelling, infection, and rare risk of systemic botulism.  The patient was given the opportunity to ask any questions, and all questions were answered to the patient's satisfaction.  The patient verbalized understanding, elected to proceed, and signed a written informed consent.  Today I have injected 30 units of Dysport in each of her  musculatures and 30 units in each of her Crow's feet regions for a total of 120 units     Patient tolerated well with no complications. She was instructed not to rub or massage the treated areas and that she should avoid lying down, bending upside down, and strenuous exercise for the rest of the day.  Instructed to wait two weeks to assess response before presenting for a touch up injection, if needed.

## 2024-06-24 ENCOUNTER — PATIENT MESSAGE (OUTPATIENT)
Dept: OBSTETRICS AND GYNECOLOGY | Facility: CLINIC | Age: 44
End: 2024-06-24
Payer: COMMERCIAL

## 2024-06-24 DIAGNOSIS — A60.00 GENITAL HERPES SIMPLEX, UNSPECIFIED SITE: Primary | ICD-10-CM

## 2024-06-24 RX ORDER — ACYCLOVIR 400 MG/1
400 TABLET ORAL 2 TIMES DAILY
Qty: 60 TABLET | Refills: 11 | Status: SHIPPED | OUTPATIENT
Start: 2024-06-24 | End: 2025-06-24

## 2024-07-10 ENCOUNTER — PATIENT MESSAGE (OUTPATIENT)
Dept: OBSTETRICS AND GYNECOLOGY | Facility: CLINIC | Age: 44
End: 2024-07-10
Payer: COMMERCIAL

## 2024-07-11 RX ORDER — TRAZODONE HYDROCHLORIDE 100 MG/1
100 TABLET ORAL NIGHTLY
Qty: 30 TABLET | Refills: 7 | Status: SHIPPED | OUTPATIENT
Start: 2024-07-11 | End: 2025-07-11

## 2024-07-16 ENCOUNTER — OFFICE VISIT (OUTPATIENT)
Dept: OBSTETRICS AND GYNECOLOGY | Facility: CLINIC | Age: 44
End: 2024-07-16
Payer: COMMERCIAL

## 2024-07-16 ENCOUNTER — LAB VISIT (OUTPATIENT)
Dept: LAB | Facility: HOSPITAL | Age: 44
End: 2024-07-16
Payer: COMMERCIAL

## 2024-07-16 VITALS
DIASTOLIC BLOOD PRESSURE: 84 MMHG | SYSTOLIC BLOOD PRESSURE: 102 MMHG | WEIGHT: 142.88 LBS | BODY MASS INDEX: 24.39 KG/M2 | HEIGHT: 64 IN

## 2024-07-16 DIAGNOSIS — Z11.3 ROUTINE SCREENING FOR STI (SEXUALLY TRANSMITTED INFECTION): ICD-10-CM

## 2024-07-16 DIAGNOSIS — Z98.82 BREAST IMPLANT STATUS: Primary | ICD-10-CM

## 2024-07-16 LAB
HBV SURFACE AG SERPL QL IA: NORMAL
HCV AB SERPL QL IA: NORMAL
HIV 1+2 AB+HIV1 P24 AG SERPL QL IA: NORMAL
TREPONEMA PALLIDUM IGG+IGM AB [PRESENCE] IN SERUM OR PLASMA BY IMMUNOASSAY: NONREACTIVE

## 2024-07-16 PROCEDURE — 86803 HEPATITIS C AB TEST: CPT | Performed by: NURSE PRACTITIONER

## 2024-07-16 PROCEDURE — 87340 HEPATITIS B SURFACE AG IA: CPT | Performed by: NURSE PRACTITIONER

## 2024-07-16 PROCEDURE — 86593 SYPHILIS TEST NON-TREP QUANT: CPT | Performed by: NURSE PRACTITIONER

## 2024-07-16 PROCEDURE — 99213 OFFICE O/P EST LOW 20 MIN: CPT | Mod: S$GLB,,, | Performed by: NURSE PRACTITIONER

## 2024-07-16 PROCEDURE — 3074F SYST BP LT 130 MM HG: CPT | Mod: CPTII,S$GLB,, | Performed by: NURSE PRACTITIONER

## 2024-07-16 PROCEDURE — 87389 HIV-1 AG W/HIV-1&-2 AB AG IA: CPT | Performed by: NURSE PRACTITIONER

## 2024-07-16 PROCEDURE — 1159F MED LIST DOCD IN RCRD: CPT | Mod: CPTII,S$GLB,, | Performed by: NURSE PRACTITIONER

## 2024-07-16 PROCEDURE — 99999 PR PBB SHADOW E&M-EST. PATIENT-LVL III: CPT | Mod: PBBFAC,,, | Performed by: NURSE PRACTITIONER

## 2024-07-16 PROCEDURE — 36415 COLL VENOUS BLD VENIPUNCTURE: CPT | Performed by: NURSE PRACTITIONER

## 2024-07-16 PROCEDURE — 87591 N.GONORRHOEAE DNA AMP PROB: CPT | Performed by: NURSE PRACTITIONER

## 2024-07-16 PROCEDURE — 3008F BODY MASS INDEX DOCD: CPT | Mod: CPTII,S$GLB,, | Performed by: NURSE PRACTITIONER

## 2024-07-16 PROCEDURE — 3079F DIAST BP 80-89 MM HG: CPT | Mod: CPTII,S$GLB,, | Performed by: NURSE PRACTITIONER

## 2024-07-16 RX ORDER — FLUCONAZOLE 150 MG/1
1 TABLET ORAL DAILY
COMMUNITY
Start: 2023-08-31

## 2024-07-16 RX ORDER — TRAZODONE HYDROCHLORIDE 100 MG/1
1 TABLET ORAL NIGHTLY PRN
COMMUNITY
Start: 2023-11-27

## 2024-07-16 RX ORDER — VALACYCLOVIR HYDROCHLORIDE 1 G/1
1 TABLET, FILM COATED ORAL 2 TIMES DAILY
COMMUNITY
Start: 2023-12-07

## 2024-07-16 RX ORDER — TRANEXAMIC ACID 650 MG/1
1300 TABLET ORAL
COMMUNITY
Start: 2023-12-07

## 2024-07-16 RX ORDER — ACYCLOVIR 50 MG/G
OINTMENT TOPICAL
COMMUNITY
Start: 2023-12-07 | End: 2024-12-06

## 2024-07-16 RX ORDER — BUPROPION HYDROCHLORIDE 150 MG/1
1 TABLET, EXTENDED RELEASE ORAL 2 TIMES DAILY
COMMUNITY
Start: 2023-11-27 | End: 2024-11-26

## 2024-07-16 NOTE — PROGRESS NOTES
Chief Complaint: Breast Concerns     HPI:      Sarah is a 44 y.o.  who presents today due to breast implant concerns.    She had a CT abd/pelvis in 2024 and was told that she had a possible breast implant rupture.   Since having her mammogram in May, she feel as if her left breast is smaller.      She is taking Acyclovir daily - no outbreaks since switching from Valacyclovir to Acyclovir. Was have multiple outbreaks on Valacyclovir.     Menses are regular. Patient's last menstrual period was 2024 (exact date). Taking TXA for heavy flow with overall relief. Has considered endometrial ablation.    Sarah is currently sexually active with a single male partner. She is currently using partner's vasectomy for contraception. Does desire STI screening.    She endorses a history of abnormal pap tests and a colposcopy 8-10 years ago. She was getting yearly pap tests since this which have been normal. Has appt in 2024 for WWE.   Previous Mammogram: BiRads: 1 T-C Score: 6.69% (2024) Results for orders placed during the hospital encounter of 24    Mammo Digital Screening Bilat w/ Shawn    Narrative  Result:  Mammo Digital Screening Bilat w/ Shawn    History:  Patient is 44 y.o. and is seen for a screening mammogram.      Films Compared:  Prior images (if available) were compared.    Findings:  This procedure was performed using tomosynthesis.  Computer-aided detection was utilized in the interpretation of this examination.    The breasts have scattered areas of fibroglandular density. There is no evidence of suspicious masses, microcalcifications or architectural distortion.  There are bilateral implants.    Impression  No mammographic evidence of malignancy.    BI-RADS Category 1: Negative    Recommendation:  Routine screening mammogram in 1 year is recommended.    Your estimated lifetime risk of breast cancer (to age 85) based on Tyrer-Cuzick risk assessment model is 6.69%.  According to the  American Cancer Society, patients with a lifetime breast cancer risk of 20% or higher might benefit from supplemental screening tests, such as screening breast MRI.      History reviewed. No pertinent past medical history.    Current Outpatient Medications:     acyclovir (ZOVIRAX) 400 MG tablet, Take 1 tablet (400 mg total) by mouth 2 (two) times daily., Disp: 60 tablet, Rfl: 11    acyclovir 5% (ZOVIRAX) 5 % ointment, Apply topically 5 (five) times daily., Disp: 30 g, Rfl: 0    acyclovir 5% (ZOVIRAX) 5 % ointment, Apply topically., Disp: , Rfl:     buPROPion (WELLBUTRIN SR) 150 MG TBSR 12 hr tablet, Take 1 tablet by mouth 2 (two) times daily., Disp: , Rfl:     buPROPion (WELLBUTRIN XL) 150 MG TB24 tablet, Take 1 tablet (150 mg total) by mouth every 12 (twelve) hours., Disp: 60 tablet, Rfl: 11    fluconazole (DIFLUCAN) 150 MG Tab, Take 1 tablet by mouth once daily., Disp: , Rfl:     tranexamic acid (LYSTEDA) 650 mg tablet, Take 2 tablets (1,300 mg total) by mouth 3 (three) times daily. During menses, Disp: 30 tablet, Rfl: 5    tranexamic acid (LYSTEDA) 650 mg tablet, Take 1,300 mg by mouth., Disp: , Rfl:     traZODone (DESYREL) 100 MG tablet, Take 1 tablet (100 mg total) by mouth every evening., Disp: 30 tablet, Rfl: 7    traZODone (DESYREL) 100 MG tablet, Take 1 tablet by mouth nightly as needed., Disp: , Rfl:     valACYclovir (VALTREX) 1000 MG tablet, Take 1 tablet by mouth 2 (two) times daily., Disp: , Rfl:    Review of patient's allergies indicates:   Allergen Reactions    Dilaudid [hydromorphone] Itching    Meperidine Nausea And Vomiting and Itching     Other reaction(s): Other (See Comments)     Past Surgical History:   Procedure Laterality Date    ABDOMINAL SURGERY      AUGMENTATION OF BREAST Bilateral 2009    silicone implants and a lift    BREAST SURGERY      lift    CHOLECYSTECTOMY      DIAGNOSTIC LAPAROSCOPY N/A 05/18/2023    Procedure: LAPAROSCOPY, DIAGNOSTIC;  Surgeon: Davy Barnes MD;   "[facility];  Service: General;  Laterality: N/A;  REPAIR INTERNAL HERNIA    DIAGNOSTIC LAPAROSCOPY N/A 07/05/2023    Procedure: LAPAROSCOPY, DIAGNOSTIC with revision of JJ anastomosis;  Surgeon: Davy Barnes MD;  [facility];  Service: General;  Laterality: N/A;    DIAGNOSTIC LAPAROSCOPY N/A 09/24/2023    Procedure: LAPAROSCOPY, DIAGNOSTIC;  Surgeon: Adal Pineda Jr., MD;  [facility];  Service: General;  Laterality: N/A;    EYE SURGERY Right     cataract    GASTRIC BYPASS      HERNIA REPAIR N/A 01/01/2016    internal    lap band removal  07/09/2014    LAPAROSCOPIC GASTRIC BANDING      LAPAROSCOPIC GASTRIC BANDING  01/01/2008    revision    LAPAROSCOPIC REPAIR OF INCISIONAL HERNIA  09/24/2023    Procedure: REPAIR, HERNIA, INCISIONAL, LAPAROSCOPIC;  Surgeon: Adal Pineda Jr., MD;  [facility];  Service: General;;     Social History     Tobacco Use    Smoking status: Never    Smokeless tobacco: Never   Substance Use Topics    Alcohol use: Yes     Comment: ocassional; wine    Drug use: No     Family History   Problem Relation Name Age of Onset    Hypertension Mother      Hypertension Father      Obesity Father         Physical Exam:      PHYSICAL EXAM:  /84   Ht 5' 4" (1.626 m)   Wt 64.8 kg (142 lb 13.7 oz)   LMP 07/12/2024 (Exact Date)   BMI 24.52 kg/m²   Body mass index is 24.52 kg/m².     APPEARANCE: Well nourished, well developed, in no acute distress.  BREASTS: R > L breast, no visible skin lesions. Implants symmetric without defects noted. No nipple discharge bilaterally.      Assessment/Plan:     Breast implant status    Routine screening for STI (sexually transmitted infection)  -     C. trachomatis/N. gonorrhoeae by AMP DNA [facility]; Urine  -     HIV 1/2 Ag/Ab (4th Gen); Future; Expected date: 07/16/2024  -     Hepatitis C Antibody; Future; Expected date: 07/16/2024  -     Treponema Pallidium Antibodies IgG, IgM; " Future; Expected date: 07/16/2024  -     Hepatitis B Surface Antigen; Future; Expected date: 07/16/2024      PLAN:    Breast asymmetry noted on exam. Encouraged Sarah to follow-up with her Plastic Surgeon to determine next steps. If any further breast imaging is recommended, can be ordered through [facility] if needed.   Full STI panel ordered.    Follow up for in 9/2024 for WWE .

## 2024-07-18 LAB
C TRACH DNA SPEC QL NAA+PROBE: NOT DETECTED
N GONORRHOEA DNA SPEC QL NAA+PROBE: NOT DETECTED

## 2024-08-16 ENCOUNTER — OFFICE VISIT (OUTPATIENT)
Dept: OPTOMETRY | Facility: CLINIC | Age: 44
End: 2024-08-16
Payer: COMMERCIAL

## 2024-08-16 DIAGNOSIS — Z01.00 ROUTINE EYE EXAM: Primary | ICD-10-CM

## 2024-08-16 PROCEDURE — 99999 PR PBB SHADOW E&M-EST. PATIENT-LVL III: CPT | Mod: PBBFAC,,, | Performed by: OPTOMETRIST

## 2024-08-16 NOTE — PROGRESS NOTES
HPI    43 Y/o female is here for routine eye exam with no C/o about ocular health      Pt denies pain and discomfort   Occ floaters   Toxoplasmosis/retinal tear/repair/cat surgery    Eye med: no gtt   Last edited by Christopher Dove, OD on 8/16/2024  9:23 AM.            Assessment /Plan     For exam results, see Encounter Report.    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  Wore PALs in past with some difficulty.  Discussed separate computer Rx, OR could get lined bifocals w top for computer/bottom for near.  Wrote new spex Rxs--discussed OK to get PLANO OS if she wishes since she suppresses that eye  Pt inquired about CL for OD--she has worn in past.  Discussed she should NOT be in CL due to monoc status/poor VA OS!!!!  She needs to stay in spex to protect her good eye    PLAN:    Retinal consult-_Dr Sidhu (pt works at [employer])

## 2024-08-23 ENCOUNTER — LAB VISIT (OUTPATIENT)
Dept: LAB | Facility: HOSPITAL | Age: 44
End: 2024-08-23
Payer: COMMERCIAL

## 2024-08-23 DIAGNOSIS — Z01.812 PRE-OPERATIVE LABORATORY EXAMINATION: ICD-10-CM

## 2024-08-23 LAB
ANION GAP SERPL CALC-SCNC: 8 MMOL/L (ref 8–16)
BASOPHILS # BLD AUTO: 0.11 K/UL (ref 0–0.2)
BASOPHILS NFR BLD: 1.2 % (ref 0–1.9)
BUN SERPL-MCNC: 20 MG/DL (ref 6–20)
CALCIUM SERPL-MCNC: 9.1 MG/DL (ref 8.7–10.5)
CHLORIDE SERPL-SCNC: 102 MMOL/L (ref 95–110)
CO2 SERPL-SCNC: 27 MMOL/L (ref 23–29)
CREAT SERPL-MCNC: 0.8 MG/DL (ref 0.5–1.4)
DIFFERENTIAL METHOD BLD: ABNORMAL
EOSINOPHIL # BLD AUTO: 0 K/UL (ref 0–0.5)
EOSINOPHIL NFR BLD: 0.2 % (ref 0–8)
ERYTHROCYTE [DISTWIDTH] IN BLOOD BY AUTOMATED COUNT: 18.5 % (ref 11.5–14.5)
EST. GFR  (NO RACE VARIABLE): >60 ML/MIN/1.73 M^2
GLUCOSE SERPL-MCNC: 72 MG/DL (ref 70–110)
HCT VFR BLD AUTO: 30.4 % (ref 37–48.5)
HGB BLD-MCNC: 8.3 G/DL (ref 12–16)
IMM GRANULOCYTES # BLD AUTO: 0.02 K/UL (ref 0–0.04)
IMM GRANULOCYTES NFR BLD AUTO: 0.2 % (ref 0–0.5)
LYMPHOCYTES # BLD AUTO: 2.5 K/UL (ref 1–4.8)
LYMPHOCYTES NFR BLD: 28.8 % (ref 18–48)
MCH RBC QN AUTO: 18.8 PG (ref 27–31)
MCHC RBC AUTO-ENTMCNC: 27.3 G/DL (ref 32–36)
MCV RBC AUTO: 69 FL (ref 82–98)
MONOCYTES # BLD AUTO: 0.7 K/UL (ref 0.3–1)
MONOCYTES NFR BLD: 8.3 % (ref 4–15)
NEUTROPHILS # BLD AUTO: 5.4 K/UL (ref 1.8–7.7)
NEUTROPHILS NFR BLD: 61.3 % (ref 38–73)
NRBC BLD-RTO: 0 /100 WBC
PLATELET # BLD AUTO: 411 K/UL (ref 150–450)
PMV BLD AUTO: 9.3 FL (ref 9.2–12.9)
POTASSIUM SERPL-SCNC: 3.8 MMOL/L (ref 3.5–5.1)
RBC # BLD AUTO: 4.41 M/UL (ref 4–5.4)
SODIUM SERPL-SCNC: 137 MMOL/L (ref 136–145)
WBC # BLD AUTO: 8.81 K/UL (ref 3.9–12.7)

## 2024-08-23 PROCEDURE — 80048 BASIC METABOLIC PNL TOTAL CA: CPT

## 2024-08-23 PROCEDURE — 85025 COMPLETE CBC W/AUTO DIFF WBC: CPT

## 2024-08-27 NOTE — PROGRESS NOTES
Patient admitted to recovery see [facility] for complete assessment pacu bcg' maintained safety measures verified patient instructed on pain scale and patient verbalized understanding. Updated patient's mom on patient location.   27-Aug-2024 20:15

## 2024-08-28 ENCOUNTER — PATIENT MESSAGE (OUTPATIENT)
Dept: OBSTETRICS AND GYNECOLOGY | Facility: CLINIC | Age: 44
End: 2024-08-28
Payer: COMMERCIAL

## 2024-08-28 ENCOUNTER — HOSPITAL ENCOUNTER (OUTPATIENT)
Dept: CARDIOLOGY | Facility: CLINIC | Age: 44
Discharge: HOME | End: 2024-08-28
Payer: COMMERCIAL

## 2024-08-28 DIAGNOSIS — T85.44XA CAPSULAR CONTRACTURE OF BREAST IMPLANT, INITIAL ENCOUNTER: ICD-10-CM

## 2024-08-28 DIAGNOSIS — T85.42XA MALPOSITION OF BREAST IMPLANT, INITIAL ENCOUNTER: ICD-10-CM

## 2024-08-28 DIAGNOSIS — Z01.818 PRE-OP EXAM: ICD-10-CM

## 2024-08-28 DIAGNOSIS — T85.44XA CAPSULAR CONTRACTURE OF BREAST IMPLANT, INITIAL ENCOUNTER: Primary | ICD-10-CM

## 2024-08-28 PROCEDURE — 93010 ELECTROCARDIOGRAM REPORT: CPT | Mod: S$GLB,,, | Performed by: STUDENT IN AN ORGANIZED HEALTH CARE EDUCATION/TRAINING PROGRAM

## 2024-08-29 LAB
OHS QRS DURATION: 66 MS
OHS QTC CALCULATION: 404 MS

## 2024-08-29 NOTE — PROGRESS NOTES
Chief Complaint: AUB     The patient location is: Home  The chief complaint leading to consultation is: AUB    Visit type: audio only    Face to Face time with patient: 20  25 minutes of total time spent on the encounter, which includes face to face time and non-face to face time preparing to see the patient (eg, review of tests), Obtaining and/or reviewing separately obtained history, Documenting clinical information in the electronic or other health record, Independently interpreting results (not separately reported) and communicating results to the patient/family/caregiver, or Care coordination (not separately reported).     Each patient to whom he or she provides medical services by telemedicine is:  (1) informed of the relationship between the physician and patient and the respective role of any other health care provider with respect to management of the patient; and (2) notified that he or she may decline to receive medical services by telemedicine and may withdraw from such care at any time.      HPI:      Sarah is a 44 y.o.  who presents today for AUB-HMB.    Has appt with Plastics  due to breast implant concerns. She had labwork done on  which showed anemia. Hgb 8.3, Hct 30.4. She is currently taking an iron pill. Chronic anemia - ongoing since . She has not previously been evaluated for this. Does have a pertinent history of a gastric bypass.     LMP: . Periods every 25-30 days, last 5-6 days with 2 heavy flow days - super plus tampon every 3-4 hours.   Up until the past year, she would just use super tampon first day and change out every 6 hours but within the last 6 months, periods have been heavier   She is currently using TXA during cycles w/ mild relief.   She has never been evaluated for her heavier periods.     Hx of oligomenorrhea in  - prior to losing 150 lbs   Denies intermenstrual bleeding, postcoital bleeding     Sarah is currently sexually active with a single  male partner. She is currently using partner's vasectomy for contraception.     She endorses a history of abnormal pap tests and a colposcopy 8-10 years ago. She was getting yearly pap tests since this which have been normal.   Previous Mammogram: BiRads: 1 T-C Score: 6.69% (5/2024)     No past medical history on file.    Current Outpatient Medications:     acyclovir (ZOVIRAX) 400 MG tablet, Take 1 tablet (400 mg total) by mouth 2 (two) times daily., Disp: 60 tablet, Rfl: 11    acyclovir 5% (ZOVIRAX) 5 % ointment, Apply topically 5 (five) times daily., Disp: 30 g, Rfl: 0    acyclovir 5% (ZOVIRAX) 5 % ointment, Apply topically., Disp: , Rfl:     buPROPion (WELLBUTRIN SR) 150 MG TBSR 12 hr tablet, Take 1 tablet by mouth 2 (two) times daily., Disp: , Rfl:     buPROPion (WELLBUTRIN XL) 150 MG TB24 tablet, Take 1 tablet (150 mg total) by mouth every 12 (twelve) hours., Disp: 60 tablet, Rfl: 11    fluconazole (DIFLUCAN) 150 MG Tab, Take 1 tablet by mouth once daily., Disp: , Rfl:     tranexamic acid (LYSTEDA) 650 mg tablet, Take 2 tablets (1,300 mg total) by mouth 3 (three) times daily. During menses, Disp: 30 tablet, Rfl: 5    tranexamic acid (LYSTEDA) 650 mg tablet, Take 1,300 mg by mouth., Disp: , Rfl:     traZODone (DESYREL) 100 MG tablet, Take 1 tablet (100 mg total) by mouth every evening., Disp: 30 tablet, Rfl: 7    traZODone (DESYREL) 100 MG tablet, Take 1 tablet by mouth nightly as needed., Disp: , Rfl:     valACYclovir (VALTREX) 1000 MG tablet, Take 1 tablet by mouth 2 (two) times daily., Disp: , Rfl:    Review of patient's allergies indicates:   Allergen Reactions    Dilaudid [hydromorphone] Itching    Meperidine Nausea And Vomiting and Itching     Other reaction(s): Other (See Comments)     Past Surgical History:   Procedure Laterality Date    ABDOMINAL SURGERY      AUGMENTATION OF BREAST Bilateral 2009    silicone implants and a lift    BREAST SURGERY      lift    CHOLECYSTECTOMY      DIAGNOSTIC LAPAROSCOPY  N/A 05/18/2023    Procedure: LAPAROSCOPY, DIAGNOSTIC;  Surgeon: Davy Barnes MD;  Location: [facility] OR [facility];  Service: General;  Laterality: N/A;  REPAIR INTERNAL HERNIA    DIAGNOSTIC LAPAROSCOPY N/A 07/05/2023    Procedure: LAPAROSCOPY, DIAGNOSTIC with revision of JJ anastomosis;  Surgeon: Davy Barnes MD;  [facility];  Service: General;  Laterality: N/A;    DIAGNOSTIC LAPAROSCOPY N/A 09/24/2023    Procedure: LAPAROSCOPY, DIAGNOSTIC;  Surgeon: Adal Pineda Jr., MD;  [facility];  Service: General;  Laterality: N/A;    EYE SURGERY Right     cataract    GASTRIC BYPASS      HERNIA REPAIR N/A 01/01/2016    internal    lap band removal  07/09/2014    LAPAROSCOPIC GASTRIC BANDING      LAPAROSCOPIC GASTRIC BANDING  01/01/2008    revision    LAPAROSCOPIC REPAIR OF INCISIONAL HERNIA  09/24/2023    Procedure: REPAIR, HERNIA, INCISIONAL, LAPAROSCOPIC;  Surgeon: Adal Pineda Jr., MD;  Location: [facility] OR [address] [place] [address];  Service: General;;     Social History     Tobacco Use    Smoking status: Never    Smokeless tobacco: Never   Substance Use Topics    Alcohol use: Yes     Comment: ocassional; wine    Drug use: No     Family History   Problem Relation Name Age of Onset    Hypertension Mother      Hypertension Father      Obesity Father         Physical Exam:      PHYSICAL EXAM:  There were no vitals taken for this visit.  There is no height or weight on file to calculate BMI.     APPEARANCE: Well nourished, well developed, in no acute distress.      Assessment/Plan:     Abnormal uterine bleeding  -     T4, Free; Future; Expected date: 08/30/2024  -     TSH; Future; Expected date: 08/30/2024  -     Iron and TIBC; Future; Expected date: 08/30/2024  -     Ferritin; Future; Expected date: 08/30/2024  -     US Pelvis Comp with Transvag NON-OB (xpd; Future; Expected date: 08/30/2024  -     Device Authorization Order      PLAN:    Patient was counseled today on the possible  causes of AUB- HMB.  We discussed the need for proper evaluation including iron studies and TFTs and TVUS to r/o structural etiology.   We discussed that endometrial tissue sampling should be performed in patients with AUB who are older than 45 years as a first-line test. Endometrial sampling also should be performed in patients younger than 45 years with a history of unopposed estrogen exposure (such as seen in obesity or PCOS), failed medical management, and persistent AUB.  We discussed the various treatment options: 1) Expectant management 2) Medical treatment vs. 3) Surgical treatment.  The patient expressed understanding of her treatment options and all questions were answered. After informed counseling, she has decided to proceed with Mirena IUD.  After discussing the risks, benefits, and alternatives, Sarah has opted to begin contraceptive treatment with an IUD. Today's discussion included:  Risks of IUD placement including but not limited to infection, uterine perforation, migration of device, or IUD expulsion.  Potential changes in bleeding patterns from increased bleeding, intermenstrual spotting, or amenorrhea.   The 0.2% risk of pregnancy with an IUD in place. Patient was thoroughly counseled that if she does become pregnant while she has an IUD, there is an increased risk of both miscarriage and ectopic pregnancy. She was advised to seek medical care immediately if she were to become pregnant.  Will consider referral to Hematology pending iron studies. Will also consider referral to PCP to further evaluate anemia/discuss CRC screening.      Follow up for EMB/Mirena IUD insertion/pap.

## 2024-08-30 ENCOUNTER — OFFICE VISIT (OUTPATIENT)
Dept: OBSTETRICS AND GYNECOLOGY | Facility: CLINIC | Age: 44
End: 2024-08-30
Payer: COMMERCIAL

## 2024-08-30 DIAGNOSIS — N93.9 ABNORMAL UTERINE BLEEDING: Primary | ICD-10-CM

## 2024-08-30 NOTE — Clinical Note
Will you please call Sarah and schedule her for a lab appt and pelvic ultrasound? Orders have been placed!

## 2024-09-04 ENCOUNTER — HOSPITAL ENCOUNTER (OUTPATIENT)
Dept: RADIOLOGY | Facility: HOSPITAL | Age: 44
Discharge: HOME | End: 2024-09-04
Attending: NURSE PRACTITIONER
Payer: COMMERCIAL

## 2024-09-04 DIAGNOSIS — N93.9 ABNORMAL UTERINE BLEEDING: ICD-10-CM

## 2024-09-04 PROCEDURE — 76830 TRANSVAGINAL US NON-OB: CPT | Mod: 26,,, | Performed by: RADIOLOGY

## 2024-09-04 PROCEDURE — 76856 US EXAM PELVIC COMPLETE: CPT | Mod: TC

## 2024-09-04 PROCEDURE — 76856 US EXAM PELVIC COMPLETE: CPT | Mod: 26,,, | Performed by: RADIOLOGY

## 2024-09-05 ENCOUNTER — PATIENT MESSAGE (OUTPATIENT)
Dept: OBSTETRICS AND GYNECOLOGY | Facility: CLINIC | Age: 44
End: 2024-09-05
Payer: COMMERCIAL

## 2024-09-05 NOTE — TELEPHONE ENCOUNTER
Call to pt to discuss pelvic u/s. Will recommend she keep appt on 9/9 for EMB/pap. Will defer Mirena IUD insertion and refer to physician for consult regarding AUB/endometrial polyp.

## 2024-09-09 ENCOUNTER — PROCEDURE VISIT (OUTPATIENT)
Dept: OBSTETRICS AND GYNECOLOGY | Facility: CLINIC | Age: 44
End: 2024-09-09
Payer: COMMERCIAL

## 2024-09-09 ENCOUNTER — LAB VISIT (OUTPATIENT)
Dept: LAB | Facility: HOSPITAL | Age: 44
End: 2024-09-09
Payer: COMMERCIAL

## 2024-09-09 ENCOUNTER — PATIENT MESSAGE (OUTPATIENT)
Dept: OBSTETRICS AND GYNECOLOGY | Facility: CLINIC | Age: 44
End: 2024-09-09

## 2024-09-09 VITALS
HEIGHT: 64 IN | SYSTOLIC BLOOD PRESSURE: 109 MMHG | WEIGHT: 141.56 LBS | BODY MASS INDEX: 24.17 KG/M2 | DIASTOLIC BLOOD PRESSURE: 64 MMHG

## 2024-09-09 DIAGNOSIS — N92.0 MENORRHAGIA WITH REGULAR CYCLE: ICD-10-CM

## 2024-09-09 DIAGNOSIS — N93.9 ABNORMAL UTERINE BLEEDING: Primary | ICD-10-CM

## 2024-09-09 DIAGNOSIS — N93.9 ABNORMAL UTERINE BLEEDING: ICD-10-CM

## 2024-09-09 DIAGNOSIS — Z12.4 SCREENING FOR CERVICAL CANCER: ICD-10-CM

## 2024-09-09 DIAGNOSIS — D64.9 ANEMIA, UNSPECIFIED TYPE: ICD-10-CM

## 2024-09-09 DIAGNOSIS — N84.0 ENDOMETRIAL POLYP: ICD-10-CM

## 2024-09-09 LAB
FERRITIN SERPL-MCNC: <2 NG/ML (ref 20–300)
IRON SERPL-MCNC: 11 UG/DL (ref 30–160)
SATURATED IRON: 2 % (ref 20–50)
T4 FREE SERPL-MCNC: 0.94 NG/DL (ref 0.71–1.51)
TOTAL IRON BINDING CAPACITY: 598 UG/DL (ref 250–450)
TRANSFERRIN SERPL-MCNC: 404 MG/DL (ref 200–375)
TSH SERPL DL<=0.005 MIU/L-ACNC: 1.37 UIU/ML (ref 0.4–4)

## 2024-09-09 PROCEDURE — 82728 ASSAY OF FERRITIN: CPT | Performed by: NURSE PRACTITIONER

## 2024-09-09 PROCEDURE — 84439 ASSAY OF FREE THYROXINE: CPT | Performed by: NURSE PRACTITIONER

## 2024-09-09 PROCEDURE — 36415 COLL VENOUS BLD VENIPUNCTURE: CPT | Performed by: NURSE PRACTITIONER

## 2024-09-09 PROCEDURE — 84443 ASSAY THYROID STIM HORMONE: CPT | Performed by: NURSE PRACTITIONER

## 2024-09-09 PROCEDURE — 99499 UNLISTED E&M SERVICE: CPT | Mod: S$GLB,,, | Performed by: NURSE PRACTITIONER

## 2024-09-09 PROCEDURE — 58100 BIOPSY OF UTERUS LINING: CPT | Mod: S$GLB,,, | Performed by: NURSE PRACTITIONER

## 2024-09-09 PROCEDURE — 83540 ASSAY OF IRON: CPT | Performed by: NURSE PRACTITIONER

## 2024-09-09 PROCEDURE — 87624 HPV HI-RISK TYP POOLED RSLT: CPT | Performed by: NURSE PRACTITIONER

## 2024-09-09 NOTE — PROCEDURES
"  Chief Complaint: EMB     Subjective:      Sarah Padron is a 44 y.o.  who presents today for endometrial biopsy.      Has appt with Plastics  due to breast implant concerns. She had labwork done on  which showed anemia. Hgb 8.3, Hct 30.4. She is currently taking an iron pill. Chronic anemia - ongoing since . She has not previously been evaluated for this. Does have a pertinent history of a gastric bypass.      LMP: . Periods every 25-30 days, last 5-6 days with 2 heavy flow days - super plus tampon every 3-4 hours.   Up until the past year, she would just use super tampon first day and change out every 6 hours but within the last 6 months, periods have been heavier   She is currently using TXA during cycles w/ mild relief.   She has never been evaluated for her heavier periods.      Hx of oligomenorrhea in  - prior to losing 150 lbs   Denies intermenstrual bleeding, postcoital bleeding      Results for orders placed during the hospital encounter of 24    US Pelvis Comp with Transvag NON-OB (xpd    Impression  Endometrial polyp.    Involuting left ovarian follicle.      Electronically signed by: Dev Blackwood MD  Date:    2024  Time:    15:12       Sarah is currently sexually active with a single male partner. She is currently using partner's vasectomy for contraception.      She endorses a history of abnormal pap tests and a colposcopy 8-10 years ago. She was getting yearly pap tests since this which have been normal.   Previous Mammogram: BiRads: 1 T-C Score: 6.69% (2024)     Objective:      UPT is negative    Vitals:    24 0852   BP: 109/64   Weight: 64.2 kg (141 lb 8.6 oz)   Height: 5' 4" (1.626 m)   PainSc: 0-No pain     GENERAL: Well nourished, well developed, in no acute distress.      Endometrial biopsy    Date/Time: 2024 8:40 AM    Performed by: Jeni Villagomez NP  Authorized by: Jeni Villagomez NP    Consent:     Consent obtained:  Prior " to procedure the appropriate consent was completed and verified    Consent given by:  Patient    Patient questions answered: yes      Patient agrees, verbalizes understanding, and wants to proceed: yes      Educational handouts given: no      Instructions and paperwork completed: yes    Indication:     Indications: Menorrhagia    Pre-procedure:     Pre-procedure timeout performed: yes    Procedure:     Procedure: endometrial biopsy with Pipelle      Cervix cleaned and prepped: yes      A paracervical block was performed: no      An intracervical block was performed: no      The cervix was dilated: no      Uterus sounded: yes      Uterus sound depth (cm):  10    Specimen collected: specimen collected and sent to pathology      Patient tolerated procedure well with no complications: yes    Comments:     Procedure comments:  Topical lidocaine used throughout the procedure     Assessment/Plan:     Abnormal uterine bleeding  -     HPV High Risk Genotypes, PCR  -     Liquid-Based Pap Smear, Screening  -     Specimen to Pathology, Ob/Gyn  -     POCT urine pregnancy  -     Endometrial biopsy    Menorrhagia with regular cycle  -     HPV High Risk Genotypes, PCR  -     Liquid-Based Pap Smear, Screening  -     Specimen to Pathology, Ob/Gyn  -     POCT urine pregnancy  -     Endometrial biopsy    Endometrial polyp  -     Specimen to Pathology, Ob/Gyn  -     POCT urine pregnancy  -     Endometrial biopsy    Anemia, unspecified type  -     Ambulatory referral/consult to Hematology / Oncology; Future; Expected date: 09/16/2024  -     Ambulatory referral/consult to Internal Medicine; Future; Expected date: 09/16/2024    Screening for cervical cancer  -     HPV High Risk Genotypes, PCR  -     Liquid-Based Pap Smear, Screening      Final plan and follow up to be based on biopsy results.    EMB  The importance of keeping follow-up appointments was discussed.For cramping NSAIDs or Tylenol were recommended.  Patient advised that she may  resume normal activities, including tampon use and intercourse, as soon as she feels ready.   Patient informed that spotting to mild bleeding is to be expected following endometrial biopsy.  Patient instructed to call the office for heavy bleeding, significant pain, or a  foul-smelling vaginal discharge.  AUB/HMB/Polyp/Anemia  Will have consult with physician to discuss management options due to suspected endometrial polyp on pelvic ultrasound  Referral to Hematology to discuss treatment options for anemia  Referral to PCP in addition to evaluate for other etiologies of her anemia; although suspected due to her AUB/HMB

## 2024-09-09 NOTE — Clinical Note
Wyatt Wood,   I am seeing Sarah for AUB/HMB. Currently using TXA w/ mild relief. Her pelvic ultrasound showed a suspected endometrial polyp. EMB done today and pending. I referred her to you to discuss all management options for her AUB/HMB/suspected endometrial polyp. She is interested in Mirena IUD as well as surgical options.   Please let me know if you have any questions at all!   Thank you for seeing her,  Jeni Villagomez, Ob/gyn NP Cell: [phone]

## 2024-09-13 LAB
HPV HR 12 DNA SPEC QL NAA+PROBE: NEGATIVE
HPV16 AG SPEC QL: NEGATIVE
HPV18 DNA SPEC QL NAA+PROBE: NEGATIVE

## 2024-09-17 ENCOUNTER — PATIENT MESSAGE (OUTPATIENT)
Dept: OBSTETRICS AND GYNECOLOGY | Facility: CLINIC | Age: 44
End: 2024-09-17
Payer: COMMERCIAL

## 2024-09-17 DIAGNOSIS — B37.31 VULVOVAGINAL CANDIDIASIS: Primary | ICD-10-CM

## 2024-09-19 ENCOUNTER — OFFICE VISIT (OUTPATIENT)
Dept: FAMILY MEDICINE | Facility: CLINIC | Age: 44
End: 2024-09-19
Payer: COMMERCIAL

## 2024-09-19 ENCOUNTER — OFFICE VISIT (OUTPATIENT)
Dept: OPTOMETRY | Facility: CLINIC | Age: 44
End: 2024-09-19
Payer: COMMERCIAL

## 2024-09-19 VITALS
BODY MASS INDEX: 26.12 KG/M2 | WEIGHT: 153 LBS | SYSTOLIC BLOOD PRESSURE: 110 MMHG | HEART RATE: 73 BPM | DIASTOLIC BLOOD PRESSURE: 70 MMHG | OXYGEN SATURATION: 98 % | HEIGHT: 64 IN

## 2024-09-19 DIAGNOSIS — H52.13 MYOPIA WITH ASTIGMATISM AND PRESBYOPIA, BILATERAL: Primary | ICD-10-CM

## 2024-09-19 DIAGNOSIS — G43.009 MIGRAINE WITHOUT AURA AND WITHOUT STATUS MIGRAINOSUS, NOT INTRACTABLE: ICD-10-CM

## 2024-09-19 DIAGNOSIS — F33.1 MODERATE EPISODE OF RECURRENT MAJOR DEPRESSIVE DISORDER: ICD-10-CM

## 2024-09-19 DIAGNOSIS — E55.9 VITAMIN D DEFICIENCY: ICD-10-CM

## 2024-09-19 DIAGNOSIS — Z00.00 HEALTHCARE MAINTENANCE: ICD-10-CM

## 2024-09-19 DIAGNOSIS — H52.203 MYOPIA WITH ASTIGMATISM AND PRESBYOPIA, BILATERAL: Primary | ICD-10-CM

## 2024-09-19 DIAGNOSIS — A60.04 HERPES SIMPLEX VULVOVAGINITIS: ICD-10-CM

## 2024-09-19 DIAGNOSIS — R73.03 PREDIABETES: ICD-10-CM

## 2024-09-19 DIAGNOSIS — H52.4 MYOPIA WITH ASTIGMATISM AND PRESBYOPIA, BILATERAL: Primary | ICD-10-CM

## 2024-09-19 DIAGNOSIS — F41.9 ANXIETY: ICD-10-CM

## 2024-09-19 DIAGNOSIS — F51.01 PRIMARY INSOMNIA: ICD-10-CM

## 2024-09-19 DIAGNOSIS — D64.9 ANEMIA, UNSPECIFIED TYPE: Primary | ICD-10-CM

## 2024-09-19 PROBLEM — G89.29 CHRONIC MIDLINE LOW BACK PAIN WITHOUT SCIATICA: Status: RESOLVED | Noted: 2024-01-09 | Resolved: 2024-09-19

## 2024-09-19 PROBLEM — R29.898 DECREASED STRENGTH OF TRUNK AND BACK: Status: RESOLVED | Noted: 2024-02-14 | Resolved: 2024-09-19

## 2024-09-19 PROBLEM — R68.89 DECREASED ACTIVITY TOLERANCE: Status: RESOLVED | Noted: 2024-02-14 | Resolved: 2024-09-19

## 2024-09-19 PROBLEM — E28.2 PCOS (POLYCYSTIC OVARIAN SYNDROME): Status: ACTIVE | Noted: 2024-09-19

## 2024-09-19 PROBLEM — M54.50 CHRONIC MIDLINE LOW BACK PAIN WITHOUT SCIATICA: Status: RESOLVED | Noted: 2024-01-09 | Resolved: 2024-09-19

## 2024-09-19 PROBLEM — R26.89 DECREASED BACK MOBILITY: Status: RESOLVED | Noted: 2024-02-14 | Resolved: 2024-09-19

## 2024-09-19 PROBLEM — M53.86 DECREASED RANGE OF MOTION OF LUMBAR SPINE: Status: RESOLVED | Noted: 2024-01-09 | Resolved: 2024-09-19

## 2024-09-19 PROCEDURE — 99999 PR PBB SHADOW E&M-EST. PATIENT-LVL III: CPT | Mod: PBBFAC,,, | Performed by: OPTOMETRIST

## 2024-09-19 PROCEDURE — 3074F SYST BP LT 130 MM HG: CPT | Mod: CPTII,S$GLB,, | Performed by: FAMILY MEDICINE

## 2024-09-19 PROCEDURE — 99214 OFFICE O/P EST MOD 30 MIN: CPT | Mod: S$GLB,,, | Performed by: FAMILY MEDICINE

## 2024-09-19 PROCEDURE — 3078F DIAST BP <80 MM HG: CPT | Mod: CPTII,S$GLB,, | Performed by: FAMILY MEDICINE

## 2024-09-19 PROCEDURE — 99999 PR PBB SHADOW E&M-EST. PATIENT-LVL IV: CPT | Mod: PBBFAC,,, | Performed by: FAMILY MEDICINE

## 2024-09-19 PROCEDURE — 99499 UNLISTED E&M SERVICE: CPT | Mod: S$GLB,,, | Performed by: OPTOMETRIST

## 2024-09-19 PROCEDURE — 3008F BODY MASS INDEX DOCD: CPT | Mod: CPTII,S$GLB,, | Performed by: FAMILY MEDICINE

## 2024-09-19 RX ORDER — BUPROPION HYDROCHLORIDE 150 MG/1
150 TABLET ORAL DAILY
Qty: 90 TABLET | Refills: 3 | Status: SHIPPED | OUTPATIENT
Start: 2024-09-19

## 2024-09-19 RX ORDER — ACYCLOVIR 400 MG/1
400 TABLET ORAL 2 TIMES DAILY
Qty: 60 TABLET | Refills: 11 | Status: SHIPPED | OUTPATIENT
Start: 2024-09-19 | End: 2025-09-19

## 2024-09-19 RX ORDER — TRAZODONE HYDROCHLORIDE 100 MG/1
100 TABLET ORAL NIGHTLY
Qty: 90 TABLET | Refills: 3 | Status: SHIPPED | OUTPATIENT
Start: 2024-09-19

## 2024-09-19 RX ORDER — CHOLECALCIFEROL (VITAMIN D3) 25 MCG
1000 TABLET ORAL DAILY
Start: 2024-09-19

## 2024-09-19 NOTE — PROGRESS NOTES
HPI    LUIS: 8/16/2024 - Dr. Dove   Chief complaint (CC): pt states that she is having difficulty with her   distance vision in her current glasses. Pt states that she sees better out   of her previous rx than her current one.   Glasses? yes  Contacts? 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   Eye gtts?       Last edited by Melida Livingston MA on 9/19/2024  2:20 PM.            Assessment /Plan     For exam results, see Encounter Report.    Myopia with astigmatism and presbyopia, bilateral      Pt presented today on 8/16/2024 from Dr Dove with issues w/PAL Rx purchased from [employer]. Pt likes old Rx better which is a Bifocal OD -3.00+0.50x029 OS -2.50+0.50x164 Add +2.25. Recommend polycarbonate lenses. Changes to SRx made today.  Rx change

## 2024-09-19 NOTE — PROGRESS NOTES
"Subjective     Patient ID: Sarah Padron is a 44 y.o. female.    Chief Complaint: Establish Care (Recent labs showed low iron)    Pt is here to establish care and follow up on Anemia.  She has long history of Iron Deficiency Anemia.    She has a long hx of Anemia for years and was supposed to be taking Iron but has not been taking her Iron for years.  She recently had labs done prior to surgery for a ruptured left breast implant  when her Anemia has worsened.  Therefore she was given a referral to Hematology.  Her Anemia has responded to oral Iron in the past but pt would like to get an Iron infusion to "jump start" her low iron level.  Says she is having Anemia symptoms of dizziness and fatigue.    Pt also has Anxiety and Depression so takes Wellbutrin.  Has long hx of MDD first starting after the birth of one of her children.  Denies SI/SH/HI.    Has Vitamin D deficiency but has not been taking her Vitamin D for years.    Has chronic Neck and Low Back Pain due to arthritis and DDD.    Has Genital Herpes and takes suppression therapy.  No recent outbreaks.      Has hx of Migraines and had full work-up by Neurology in the past.  Not currently on any medications.    Has Prediabetes no treatment needed.    Has PCOS with hx of ruptured cysts in the past.  No current symptoms.  Review of Systems   Constitutional:  Positive for fatigue (due to anemia). Negative for appetite change, chills, fever and unexpected weight change.   HENT:  Negative for ear pain, trouble swallowing and voice change.    Eyes:  Negative for pain.   Respiratory:  Negative for cough, shortness of breath and wheezing.    Cardiovascular:  Negative for chest pain and palpitations.   Gastrointestinal:  Negative for abdominal pain, blood in stool, constipation, diarrhea, nausea and vomiting.   Endocrine: Negative for cold intolerance and heat intolerance.   Genitourinary:  Negative for difficulty urinating, dysuria, frequency, hematuria and " urgency.   Musculoskeletal:  Positive for back pain and neck pain. Negative for neck stiffness.   Integumentary:  Negative for color change and rash.   Neurological:  Positive for light-headedness (due to anemia) and headaches. Negative for seizures, speech difficulty, weakness and coordination difficulties.   Psychiatric/Behavioral:  Positive for depressed mood and sleep disturbance. Negative for confusion and suicidal ideas. The patient is nervous/anxious.    Objective     Physical Exam  Vitals and nursing note reviewed.   Constitutional:       General: She is not in acute distress.     Appearance: Normal appearance. She is not ill-appearing.   HENT:      Head: Normocephalic and atraumatic.      Right Ear: Tympanic membrane, ear canal and external ear normal.      Left Ear: Tympanic membrane, ear canal and external ear normal.      Nose: Nose normal.   Eyes:      General: No scleral icterus.     Extraocular Movements: Extraocular movements intact.      Conjunctiva/sclera: Conjunctivae normal.      Pupils: Pupils are equal, round, and reactive to light.   Cardiovascular:      Rate and Rhythm: Normal rate and regular rhythm.      Pulses: Normal pulses.      Heart sounds: Normal heart sounds.      No gallop.   Pulmonary:      Effort: Pulmonary effort is normal. No respiratory distress.      Breath sounds: Normal breath sounds. No wheezing or rales.   Abdominal:      General: Bowel sounds are normal. There is no distension.      Palpations: Abdomen is soft. There is no mass.      Tenderness: There is no abdominal tenderness.   Musculoskeletal:         General: No swelling, tenderness or deformity. Normal range of motion.      Cervical back: Normal range of motion and neck supple. No rigidity or tenderness.   Skin:     General: Skin is warm and dry.      Coloration: Skin is not jaundiced.      Findings: No rash.   Neurological:      General: No focal deficit present.      Mental Status: She is alert and oriented to  person, place, and time.      Cranial Nerves: No cranial nerve deficit.   Psychiatric:         Mood and Affect: Mood normal.         Behavior: Behavior normal.         Thought Content: Thought content normal.         Judgment: Judgment normal.     Assessment and Plan     Iron Deficiency Anemia         - Teaching done on iron-deficiency anemia including possible causes, treatment options, and warning symptoms.  Patient needs to immediately restart her iron 325 mg p.o. daily.  Also has referral upcoming with Hematology as patient would like to request an iron infusion.  Continue to monitor.  -     Ambulatory referral/consult to Hematology    Prediabetes  - Teaching on Prediabetes including minimizing risk factors, diabetic diet, medications, exercise >150 minutes per week, and wt management.   -     Hemoglobin A1C; Future; Expected date: 09/19/2024    Moderate episode of recurrent major depressive disorder  - Teaching reviewed on MDD including coping skills, safety plan and treatment options.  Continue Wellbutrin daily as ordered.  Declines offer for counseling.   -     buPROPion (WELLBUTRIN XL) 150 MG TB24 tablet; Take 1 tablet (150 mg total) by mouth once daily.  Dispense: 90 tablet; Refill: 3    Anxiety  - Teaching reviewed on MDD including coping skills, safety plan and treatment options.  Continue/start medication daily.  Declines offer for counseling.   -     buPROPion (WELLBUTRIN XL) 150 MG TB24 tablet; Take 1 tablet (150 mg total) by mouth once daily.  Dispense: 90 tablet; Refill: 3    Primary insomnia  - Teaching done on Insomnia including good sleep hygiene and treatment options.  Continue Trazodone as ordered.  -     traZODone (DESYREL) 100 MG tablet; Take 1 tablet (100 mg total) by mouth every evening.  Dispense: 90 tablet; Refill: 3    Herpes simplex vulvovaginitis  - Teaching reviewed on Genital Herpes including preventing spread and treatment options.  Continue suppression therapy and use of condoms  -      acyclovir (ZOVIRAX) 400 MG tablet; Take 1 tablet (400 mg total) by mouth 2 (two) times daily.  Dispense: 60 tablet; Refill: 11    Vitamin D deficiency  - Teaching on Vitamin D deficiency including diet changes and supplementation.  Restart OTC Vitamin D supplement.  -     vitamin D (VITAMIN D3) 1000 units Tab; Take 1 tablet (1,000 Units total) by mouth once daily.    Migraine without aura and without status migrainosus, not intractable         - Teaching reviewed on Migraine including avoiding triggers, prevention, and treatment options.  Symptomatic treatment for now.  Will consider suppression therapy if desired although has been on suppression medication in the past without success.    Healthcare maintenance  -     Lipid Panel; Future; Expected date: 09/19/2024    Follow up in 6 months for chronic illnesses

## 2024-09-19 NOTE — PROGRESS NOTES
PATIENT: Sarah Padron  MRN: [id number]  DATE: 9/23/2024    Diagnosis:   1. Iron deficiency anemia due to chronic blood loss    2. History of Oygesh-en-Y gastric bypass    3. Menorrhagia with regular cycle        Chief Complaint: Anemia      Oncologic History:      Oncologic History     Oncologic Treatment     Pathology       Telemedicine visit:  The patient location is: home  The chief complaint leading to consultation is: iron deficiency anemia    Visit type: audiovisual    Face to Face time with patient: 15 minutes  20 minutes of total time spent on the encounter, which includes face to face time and non-face to face time preparing to see the patient (eg, review of tests), Obtaining and/or reviewing separately obtained history, Documenting clinical information in the electronic or other health record, Independently interpreting results (not separately reported) and communicating results to the patient/family/caregiver, or Care coordination (not separately reported).     Each patient to whom he or she provides medical services by telemedicine is:  (1) informed of the relationship between the physician and patient and the respective role of any other health care provider with respect to management of the patient; and (2) notified that he or she may decline to receive medical services by telemedicine and may withdraw from such care at any time.    Notes: see below    Subjective:    History of Present Illness: Ms. Padron is a 44 y.o. female who presents for evaluation and management of iron deficiency anemia. She is referred by nurse practitioner Jeni Villagomez.    - labs since 2016 reveal a mild-to-moderate microcytic anemia  - iron studies (9/9/24) revealed a decreased ferritin/iron/saturated iron with elevated total iron binding capacity.  - she had gastric bypass in 2014.  - she endorses menorrhagia over the past 6 months.  - she endorses fatigue. She denies shortness of breath, chest pain, nausea,  vomiting, diarrhea, constipation.    - oral iron      Past medical, surgical, family, and social histories have been reviewed and updated below.    Past Medical History: No past medical history on file.    Past Surgical History:   Past Surgical History:   Procedure Laterality Date    ABDOMINAL SURGERY      AUGMENTATION OF BREAST Bilateral 2009    silicone implants and a lift    BREAST SURGERY      lift    CHOLECYSTECTOMY      DIAGNOSTIC LAPAROSCOPY N/A 05/18/2023    Procedure: LAPAROSCOPY, DIAGNOSTIC;  Surgeon: Davy Barnes MD;  Location: [facility] OR [address] [place] [address];  Service: General;  Laterality: N/A;  REPAIR INTERNAL HERNIA    DIAGNOSTIC LAPAROSCOPY N/A 07/05/2023    Procedure: LAPAROSCOPY, DIAGNOSTIC with revision of JJ anastomosis;  Surgeon: Davy Barnes MD;  Location: [facility] OR [address] [place] [address];  Service: General;  Laterality: N/A;    DIAGNOSTIC LAPAROSCOPY N/A 09/24/2023    Procedure: LAPAROSCOPY, DIAGNOSTIC;  Surgeon: Adal Pineda Jr., MD;  Location: [facility] OR [address] [place] [address];  Service: General;  Laterality: N/A;    EYE SURGERY Right     cataract    GASTRIC BYPASS      HERNIA REPAIR N/A 01/01/2016    internal    lap band removal  07/09/2014    LAPAROSCOPIC GASTRIC BANDING      LAPAROSCOPIC GASTRIC BANDING  01/01/2008    revision    LAPAROSCOPIC REPAIR OF INCISIONAL HERNIA  09/24/2023    Procedure: REPAIR, HERNIA, INCISIONAL, LAPAROSCOPIC;  Surgeon: Adal Pineda Jr., MD;  Location: [facility] OR [address] [place] [address];  Service: General;;       Family History:   Family History   Problem Relation Name Age of Onset    Hypertension Mother      Hypertension Father      Obesity Father         Social History:  reports that she has never smoked. She has never used smokeless tobacco. She reports current alcohol use. She reports that she does not use drugs.    Allergies:  Review of patient's allergies indicates:   Allergen Reactions    Dilaudid [hydromorphone] Itching       Medications:  Current Outpatient Medications   Medication Sig  Dispense Refill    acyclovir (ZOVIRAX) 400 MG tablet Take 1 tablet (400 mg total) by mouth 2 (two) times daily. 60 tablet 11    acyclovir 5% (ZOVIRAX) 5 % ointment Apply topically 5 (five) times daily. 30 g 0    buPROPion (WELLBUTRIN XL) 150 MG TB24 tablet Take 1 tablet (150 mg total) by mouth once daily. 90 tablet 3    cephALEXin (KEFLEX) 500 MG capsule TAKE 2 CAPSULE ORAL EVERY 12 HOURS 28 capsule 0    ketorolac (TORADOL) 10 mg tablet TAKE 1 TABLET ORAL AS NEEDED EVERY 6 HOURS 12 tablet 0    oxyCODONE (ROXICODONE) 5 MG immediate release tablet TAKE 1 TABLET ORAL AS NEEDED EVERY 4 HOURS 20 tablet 0    tranexamic acid (LYSTEDA) 650 mg tablet Take 2 tablets (1,300 mg total) by mouth 3 (three) times daily. During menses 30 tablet 5    traZODone (DESYREL) 100 MG tablet Take 1 tablet (100 mg total) by mouth every evening. 90 tablet 3     No current facility-administered medications for this visit.       Review of Systems   Constitutional:  Positive for fatigue.   HENT:  Negative for sore throat.    Eyes:  Negative for visual disturbance.   Respiratory:  Negative for cough and shortness of breath.    Cardiovascular:  Negative for chest pain.   Gastrointestinal:  Negative for abdominal pain, constipation, diarrhea, nausea and vomiting.   Genitourinary:  Positive for menstrual problem. Negative for dysuria.   Musculoskeletal:  Negative for back pain.   Skin:  Negative for rash.   Neurological:  Negative for headaches.   Hematological:  Negative for adenopathy.   Psychiatric/Behavioral:  The patient is not nervous/anxious.        ECOG Performance Status:   ECOG SCORE    0 - Fully active-able to carry on all pre-disease performance without restriction         Objective:      Vitals: There were no vitals filed for this visit.  BMI: There is no height or weight on file to calculate BMI.  Deferred due to telemedicine visit.    Physical Exam  Deferred due to telemedicine visit.    Laboratory Data:  Labs have been  reviewed.    Lab Results   Component Value Date    WBC 8.81 08/23/2024    HGB 8.3 (L) 08/23/2024    HCT 30.4 (L) 08/23/2024    MCV 69 (L) 08/23/2024     08/23/2024           Imaging:    Assessment:       1. Iron deficiency anemia due to chronic blood loss    2. Menorrhagia with regular cycle           Plan:     Iron deficiency anemia / menorrhagia  - I have reviewed her chart  - labs since 2016 reveal a mild-to-moderate microcytic anemia  - iron studies (9/9/24) revealed a decreased ferritin/iron/saturated iron with elevated total iron binding capacity.  - she endorses menorrhagia over the past 6 months.  - she had gastric bypass in 2014. So, she may have decreased absorption of iron as well.  - she is taking oral iron.  - I recommend ferric carboxymaltose x 2 doses  - return to clinic in 3 months with repeat labs.     - return to clinic in 3 months with repeat labs.      Rishi Mendoza M.D.  Hematology/Oncology  [facility] - [address], [address]  [place] [address]  Phone: [phone]  Fax: [phone]

## 2024-09-20 RX ORDER — FLUCONAZOLE 150 MG/1
150 TABLET ORAL
Qty: 2 TABLET | Refills: 0 | Status: SHIPPED | OUTPATIENT
Start: 2024-09-20 | End: 2024-09-27

## 2024-09-23 ENCOUNTER — OFFICE VISIT (OUTPATIENT)
Dept: HEMATOLOGY/ONCOLOGY | Facility: CLINIC | Age: 44
End: 2024-09-23
Payer: COMMERCIAL

## 2024-09-23 DIAGNOSIS — N92.0 MENORRHAGIA WITH REGULAR CYCLE: ICD-10-CM

## 2024-09-23 DIAGNOSIS — Z98.84 HISTORY OF ROUX-EN-Y GASTRIC BYPASS: ICD-10-CM

## 2024-09-23 DIAGNOSIS — D50.0 IRON DEFICIENCY ANEMIA DUE TO CHRONIC BLOOD LOSS: Primary | ICD-10-CM

## 2024-09-23 PROCEDURE — 1159F MED LIST DOCD IN RCRD: CPT | Mod: CPTII,95,, | Performed by: INTERNAL MEDICINE

## 2024-09-23 PROCEDURE — 99204 OFFICE O/P NEW MOD 45 MIN: CPT | Mod: 95,,, | Performed by: INTERNAL MEDICINE

## 2024-09-23 PROCEDURE — 1160F RVW MEDS BY RX/DR IN RCRD: CPT | Mod: CPTII,95,, | Performed by: INTERNAL MEDICINE

## 2024-09-23 RX ORDER — DIPHENHYDRAMINE HYDROCHLORIDE 50 MG/ML
50 INJECTION INTRAMUSCULAR; INTRAVENOUS ONCE AS NEEDED
OUTPATIENT
Start: 2024-10-08

## 2024-09-23 RX ORDER — HEPARIN 100 UNIT/ML
500 SYRINGE INTRAVENOUS
OUTPATIENT
Start: 2024-10-01

## 2024-09-23 RX ORDER — EPINEPHRINE 0.3 MG/.3ML
0.3 INJECTION SUBCUTANEOUS ONCE AS NEEDED
OUTPATIENT
Start: 2024-10-01

## 2024-09-23 RX ORDER — EPINEPHRINE 0.3 MG/.3ML
0.3 INJECTION SUBCUTANEOUS ONCE AS NEEDED
OUTPATIENT
Start: 2024-10-08

## 2024-09-23 RX ORDER — HEPARIN 100 UNIT/ML
500 SYRINGE INTRAVENOUS
OUTPATIENT
Start: 2024-10-08

## 2024-09-23 RX ORDER — SODIUM CHLORIDE 0.9 % (FLUSH) 0.9 %
10 SYRINGE (ML) INJECTION
OUTPATIENT
Start: 2024-10-08

## 2024-09-23 RX ORDER — DIPHENHYDRAMINE HYDROCHLORIDE 50 MG/ML
50 INJECTION INTRAMUSCULAR; INTRAVENOUS ONCE AS NEEDED
OUTPATIENT
Start: 2024-10-01

## 2024-09-23 RX ORDER — SODIUM CHLORIDE 0.9 % (FLUSH) 0.9 %
10 SYRINGE (ML) INJECTION
OUTPATIENT
Start: 2024-10-01

## 2024-09-25 ENCOUNTER — PATIENT MESSAGE (OUTPATIENT)
Dept: FAMILY MEDICINE | Facility: CLINIC | Age: 44
End: 2024-09-25
Payer: COMMERCIAL

## 2024-09-25 ENCOUNTER — LAB VISIT (OUTPATIENT)
Dept: LAB | Facility: HOSPITAL | Age: 44
End: 2024-09-25
Attending: INTERNAL MEDICINE
Payer: COMMERCIAL

## 2024-09-25 ENCOUNTER — PATIENT MESSAGE (OUTPATIENT)
Dept: OPTOMETRY | Facility: CLINIC | Age: 44
End: 2024-09-25
Payer: COMMERCIAL

## 2024-09-25 DIAGNOSIS — D50.0 IRON DEFICIENCY ANEMIA DUE TO CHRONIC BLOOD LOSS: ICD-10-CM

## 2024-09-25 LAB
BASOPHILS # BLD AUTO: 0.11 K/UL (ref 0–0.2)
BASOPHILS NFR BLD: 1.4 % (ref 0–1.9)
DIFFERENTIAL METHOD BLD: ABNORMAL
EOSINOPHIL # BLD AUTO: 0.2 K/UL (ref 0–0.5)
EOSINOPHIL NFR BLD: 2.4 % (ref 0–8)
ERYTHROCYTE [DISTWIDTH] IN BLOOD BY AUTOMATED COUNT: 20.3 % (ref 11.5–14.5)
FERRITIN SERPL-MCNC: 14 NG/ML (ref 20–300)
HCT VFR BLD AUTO: 27.5 % (ref 37–48.5)
HGB BLD-MCNC: 7.2 G/DL (ref 12–16)
IMM GRANULOCYTES # BLD AUTO: 0.02 K/UL (ref 0–0.04)
IMM GRANULOCYTES NFR BLD AUTO: 0.3 % (ref 0–0.5)
IRON SERPL-MCNC: 18 UG/DL (ref 30–160)
LYMPHOCYTES # BLD AUTO: 2.2 K/UL (ref 1–4.8)
LYMPHOCYTES NFR BLD: 27.3 % (ref 18–48)
MCH RBC QN AUTO: 18.3 PG (ref 27–31)
MCHC RBC AUTO-ENTMCNC: 26.2 G/DL (ref 32–36)
MCV RBC AUTO: 70 FL (ref 82–98)
MONOCYTES # BLD AUTO: 0.9 K/UL (ref 0.3–1)
MONOCYTES NFR BLD: 11.1 % (ref 4–15)
NEUTROPHILS # BLD AUTO: 4.6 K/UL (ref 1.8–7.7)
NEUTROPHILS NFR BLD: 57.5 % (ref 38–73)
NRBC BLD-RTO: 0 /100 WBC
PLATELET # BLD AUTO: 342 K/UL (ref 150–450)
PMV BLD AUTO: 10 FL (ref 9.2–12.9)
RBC # BLD AUTO: 3.93 M/UL (ref 4–5.4)
SATURATED IRON: 3 % (ref 20–50)
TOTAL IRON BINDING CAPACITY: 638 UG/DL (ref 250–450)
TRANSFERRIN SERPL-MCNC: 431 MG/DL (ref 200–375)
WBC # BLD AUTO: 7.95 K/UL (ref 3.9–12.7)

## 2024-09-25 PROCEDURE — 85025 COMPLETE CBC W/AUTO DIFF WBC: CPT | Performed by: INTERNAL MEDICINE

## 2024-09-25 PROCEDURE — 36415 COLL VENOUS BLD VENIPUNCTURE: CPT | Performed by: INTERNAL MEDICINE

## 2024-09-25 PROCEDURE — 82728 ASSAY OF FERRITIN: CPT | Performed by: INTERNAL MEDICINE

## 2024-09-25 PROCEDURE — 83540 ASSAY OF IRON: CPT | Performed by: INTERNAL MEDICINE

## 2024-09-30 ENCOUNTER — TELEPHONE (OUTPATIENT)
Dept: INFUSION THERAPY | Facility: HOSPITAL | Age: 44
End: 2024-09-30
Payer: COMMERCIAL

## 2024-09-30 NOTE — TELEPHONE ENCOUNTER
Pt returned call to infusion center. Pt states she works at [employer] and will find out the availability for next infusion there. She will call infusion center back

## 2024-10-07 ENCOUNTER — OFFICE VISIT (OUTPATIENT)
Dept: OPHTHALMOLOGY | Facility: CLINIC | Age: 44
End: 2024-10-07
Payer: COMMERCIAL

## 2024-10-07 DIAGNOSIS — B58.01 TOXOPLASMA CHORIORETINITIS, BILATERAL: Primary | ICD-10-CM

## 2024-10-07 DIAGNOSIS — H33.311 RETINAL TEAR, RIGHT: ICD-10-CM

## 2024-10-07 DIAGNOSIS — H31.013 MACULAR SCAR OF BOTH EYES: ICD-10-CM

## 2024-10-07 PROCEDURE — 99999 PR PBB SHADOW E&M-EST. PATIENT-LVL III: CPT | Mod: PBBFAC,,, | Performed by: OPHTHALMOLOGY

## 2024-10-07 PROCEDURE — 92004 COMPRE OPH EXAM NEW PT 1/>: CPT | Mod: S$GLB,,, | Performed by: OPHTHALMOLOGY

## 2024-10-07 PROCEDURE — 92134 CPTRZ OPH DX IMG PST SGM RTA: CPT | Mod: S$GLB,,, | Performed by: OPHTHALMOLOGY

## 2024-10-07 PROCEDURE — 92201 OPSCPY EXTND RTA DRAW UNI/BI: CPT | Mod: S$GLB,,, | Performed by: OPHTHALMOLOGY

## 2024-10-07 PROCEDURE — 1159F MED LIST DOCD IN RCRD: CPT | Mod: CPTII,S$GLB,, | Performed by: OPHTHALMOLOGY

## 2024-10-07 PROCEDURE — 1160F RVW MEDS BY RX/DR IN RCRD: CPT | Mod: CPTII,S$GLB,, | Performed by: OPHTHALMOLOGY

## 2024-10-11 ENCOUNTER — OFFICE VISIT (OUTPATIENT)
Dept: OBSTETRICS AND GYNECOLOGY | Facility: CLINIC | Age: 44
End: 2024-10-11
Payer: COMMERCIAL

## 2024-10-11 ENCOUNTER — PATIENT MESSAGE (OUTPATIENT)
Dept: OBSTETRICS AND GYNECOLOGY | Facility: CLINIC | Age: 44
End: 2024-10-11

## 2024-10-11 ENCOUNTER — TELEPHONE (OUTPATIENT)
Dept: OBSTETRICS AND GYNECOLOGY | Facility: CLINIC | Age: 44
End: 2024-10-11

## 2024-10-11 VITALS
DIASTOLIC BLOOD PRESSURE: 61 MMHG | SYSTOLIC BLOOD PRESSURE: 96 MMHG | BODY MASS INDEX: 23.93 KG/M2 | WEIGHT: 140.19 LBS | HEIGHT: 64 IN

## 2024-10-11 DIAGNOSIS — N92.0 MENORRHAGIA WITH REGULAR CYCLE: Primary | ICD-10-CM

## 2024-10-11 DIAGNOSIS — N84.0 ENDOMETRIAL POLYP: ICD-10-CM

## 2024-10-11 PROCEDURE — 99999 PR PBB SHADOW E&M-EST. PATIENT-LVL III: CPT | Mod: PBBFAC,,, | Performed by: STUDENT IN AN ORGANIZED HEALTH CARE EDUCATION/TRAINING PROGRAM

## 2024-10-11 RX ORDER — FAMOTIDINE 20 MG/1
20 TABLET, FILM COATED ORAL
OUTPATIENT
Start: 2024-10-11

## 2024-10-11 RX ORDER — SODIUM CHLORIDE 9 MG/ML
INJECTION, SOLUTION INTRAVENOUS CONTINUOUS
OUTPATIENT
Start: 2024-10-11

## 2024-10-11 NOTE — TELEPHONE ENCOUNTER
Spoke with patient over the phone and she confirmed that she would like to have Hysteroscopy D&C with IUD placement on October 30th. Will place case request and orders.    Juan Wood M.D.    
Yes

## 2024-10-11 NOTE — PROGRESS NOTES
Gynecology    SUBJECTIVE:     Chief Complaint: Consult       History of Present Illness:  Sarah Padron is a 43 yo  female here to discuss management of AUB and endometrial polyp. She has previously seen Isabel Villagomez NP for this and recently underwent EMBx. She presents today to discuss surgical management.     Final Pathologic Diagnosis ENDOMETRIUM, BIOPSY:  - Early secretory endometrium.  - Stromal breakdown.  - No hyperplasia, atypia, or malignancy.     Periods every 25-30 days, last 5-6 days with 2 heavy flow days - super plus tampon every 3-4 hours.   Up until the past year, she would just use super tampon first day and change out every 6 hours but within the last 6 months, periods have been heavier   She is currently using TXA during cycles w/ mild relief.     TVUS FINDINGS:  Uterus measures 9.5 x 5.0 x 5.9 cm, endometrium 20.2 mm.  There is a possible endometrial polyp measuring 0.9 x 0.3 x 0.5 cm.      Review of Systems:  Review of Systems   Constitutional:  Negative for chills and fever.   Respiratory:  Negative for shortness of breath.    Cardiovascular:  Negative for chest pain.   Gastrointestinal:  Negative for abdominal pain, nausea and vomiting.   Endocrine: Negative for hot flashes.   Genitourinary:  Positive for menorrhagia. Negative for menstrual problem.   Integumentary:  Negative for breast mass, nipple discharge and breast skin changes.   Neurological:  Negative for headaches.   Hematological:  Does not bruise/bleed easily.   Psychiatric/Behavioral:  Negative for depression.    Breast: Negative for mass, mastodynia, nipple discharge and skin changes       OBJECTIVE:     Physical Exam:  Physical Exam  Constitutional:       Appearance: Normal appearance.   HENT:      Head: Normocephalic and atraumatic.   Eyes:      Conjunctiva/sclera: Conjunctivae normal.      Pupils: Pupils are equal, round, and reactive to light.   Cardiovascular:      Rate and Rhythm: Normal rate and regular  rhythm.   Pulmonary:      Effort: Pulmonary effort is normal. No respiratory distress.   Musculoskeletal:         General: Normal range of motion.      Cervical back: Normal range of motion.   Neurological:      Mental Status: She is alert.   Psychiatric:         Mood and Affect: Mood normal.         Thought Content: Thought content normal.         ASSESSMENT:       ICD-10-CM ICD-9-CM    1. Menorrhagia with regular cycle  N92.0 626.2 Device Authorization Order      2. Endometrial polyp  N84.0 621.0              Plan:      Sarah was seen today for consult.    Diagnoses and all orders for this visit:    Menorrhagia with regular cycle  -     Device Authorization Order    Endometrial polyp    Reviewed pathology and radiology findings with patient  Discussed recommendation for endometrial polyp removal via operative hysteroscopy D&C  Discussed placement of IUD vs endometrial ablation as well  Patient desires to have IUD placed  Consent forms reviewed and signed with patient today  Tentatively plan for surgery on 10/30    Orders Placed This Encounter   Procedures    Device Authorization Order       No follow-ups on file.    Juan Wood    Answers submitted by the patient for this visit:  Vaginal Bleeding Questionnaire  (Submitted on 10/11/2024)  Chief Complaint: Vaginal bleeding  Chronicity: recurrent  Onset: more than 1 month ago  Frequency: rarely  Progression since onset: rapidly worsening  Pain severity: moderate  Affected side: both  Pregnant now?: No  abdominal pain: No  anorexia: No  back pain: No  chills: No  constipation: No  diarrhea: No  discolored urine: No  dysuria: No  fever: No  flank pain: No  frequency: No  headaches: No  hematuria: No  nausea: No  painful intercourse: No  rash: No  urgency: No  vomiting: No  Vaginal bleeding: lighter than menses  Passing clots?: No  Passing tissue?: No  Aggravated by: nothing  treatments tried: acetaminophen  Improvement on treatment: no relief  Sexual  activity: sexually active  Partner with STD symptoms: yes  Birth control: vasectomy  Menstrual history: regular  STD: Yes  abdominal surgery: Yes   section: No  Ectopic pregnancy: No  Endometriosis: No  herpes simplex: Yes  gynecological surgery: No  menorrhagia: Yes  metrorrhagia: No  miscarriage: No  ovarian cysts: Yes  perineal abscess: No  PID: No  terminated pregnancy: No  vaginosis: No

## 2024-10-14 ENCOUNTER — INFUSION (OUTPATIENT)
Dept: INFUSION THERAPY | Facility: HOSPITAL | Age: 44
End: 2024-10-14
Attending: INTERNAL MEDICINE
Payer: COMMERCIAL

## 2024-10-14 VITALS
HEART RATE: 101 BPM | TEMPERATURE: 98 F | DIASTOLIC BLOOD PRESSURE: 59 MMHG | OXYGEN SATURATION: 98 % | SYSTOLIC BLOOD PRESSURE: 103 MMHG | RESPIRATION RATE: 18 BRPM

## 2024-10-14 DIAGNOSIS — D50.0 IRON DEFICIENCY ANEMIA DUE TO CHRONIC BLOOD LOSS: Primary | ICD-10-CM

## 2024-10-14 PROCEDURE — 96365 THER/PROPH/DIAG IV INF INIT: CPT

## 2024-10-14 PROCEDURE — A4216 STERILE WATER/SALINE, 10 ML: HCPCS | Performed by: INTERNAL MEDICINE

## 2024-10-14 PROCEDURE — 25000003 PHARM REV CODE 250: Performed by: INTERNAL MEDICINE

## 2024-10-14 PROCEDURE — 63600175 PHARM REV CODE 636 W HCPCS: Mod: JZ,JG | Performed by: INTERNAL MEDICINE

## 2024-10-14 RX ORDER — SODIUM CHLORIDE 0.9 % (FLUSH) 0.9 %
10 SYRINGE (ML) INJECTION
Status: DISCONTINUED | OUTPATIENT
Start: 2024-10-14 | End: 2024-10-14 | Stop reason: HOSPADM

## 2024-10-14 RX ORDER — EPINEPHRINE 0.3 MG/.3ML
0.3 INJECTION SUBCUTANEOUS ONCE AS NEEDED
Status: DISCONTINUED | OUTPATIENT
Start: 2024-10-14 | End: 2024-10-14 | Stop reason: HOSPADM

## 2024-10-14 RX ORDER — DIPHENHYDRAMINE HYDROCHLORIDE 50 MG/ML
50 INJECTION INTRAMUSCULAR; INTRAVENOUS ONCE AS NEEDED
Status: DISCONTINUED | OUTPATIENT
Start: 2024-10-14 | End: 2024-10-14 | Stop reason: HOSPADM

## 2024-10-14 RX ADMIN — SODIUM CHLORIDE: 9 INJECTION, SOLUTION INTRAVENOUS at 03:10

## 2024-10-14 RX ADMIN — FERRIC CARBOXYMALTOSE INJECTION 750 MG: 50 INJECTION, SOLUTION INTRAVENOUS at 03:10

## 2024-10-14 RX ADMIN — Medication 10 ML: at 04:10

## 2024-10-14 NOTE — NURSING
Pt tolerated Injecatfer dose 1 of 2 infusion well.  No adverse reaction noted. IV flushed with NS and D/C per protocol.  Patient left clinic in no acute distress.

## 2024-10-14 NOTE — PROGRESS NOTES
Subjective:       Patient ID: Sarah Padron is a 44 y.o. 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 Romi Shrestha, OD    Cc: pt report vison is blurry due to toxo scarring OU    --blurred Va  --diplopia  --eye pain  ++flashes rarely (pulsates) OD/+ floaters OD  --curtain/shadow /veils ( only occurs when having migraines attack)     Eye Meds: NONE    POHx:  1. Myopia OU     H/o Avastin injections OD then Av and steroids then cataract  H/o retinal laser when had symptoms and told there was a retinal tear            Last edited by Mark Anthony Sidhu MD on 10/14/2024  6:16 PM.        Imaging:    See report    Assessment/Plan:     1. Toxoplasma chorioretinitis, bilateral (Primary)  No activity today  Vision stable per pt without any new symptoms  Discussed  Observe     2. Macular scar of both eyes  Dense scar OS with decreased Va  Rec full time polycarbs    - Posterior Segment OCT Retina-Both eyes    3. Retinal tear, right  Stable s/p retinopexy  RD precautions discussed in detail, patient expressed understanding  RTC immediately PRN (especially ANY change flashes, floaters, vision, visual field)      Follow up in about 1 year (around 10/7/2025), or if symptoms worsen or fail to improve, for Comprehensive Examination, OCT Mac.

## 2024-10-21 ENCOUNTER — INFUSION (OUTPATIENT)
Dept: INFUSION THERAPY | Facility: HOSPITAL | Age: 44
End: 2024-10-21
Attending: INTERNAL MEDICINE
Payer: COMMERCIAL

## 2024-10-21 VITALS
HEART RATE: 96 BPM | SYSTOLIC BLOOD PRESSURE: 97 MMHG | RESPIRATION RATE: 18 BRPM | OXYGEN SATURATION: 98 % | DIASTOLIC BLOOD PRESSURE: 61 MMHG | TEMPERATURE: 98 F

## 2024-10-21 DIAGNOSIS — D50.0 IRON DEFICIENCY ANEMIA DUE TO CHRONIC BLOOD LOSS: Primary | ICD-10-CM

## 2024-10-21 PROCEDURE — 63600175 PHARM REV CODE 636 W HCPCS: Mod: JZ,JG | Performed by: INTERNAL MEDICINE

## 2024-10-21 PROCEDURE — 96365 THER/PROPH/DIAG IV INF INIT: CPT

## 2024-10-21 PROCEDURE — 25000003 PHARM REV CODE 250: Performed by: INTERNAL MEDICINE

## 2024-10-21 PROCEDURE — A4216 STERILE WATER/SALINE, 10 ML: HCPCS | Performed by: INTERNAL MEDICINE

## 2024-10-21 RX ORDER — SODIUM CHLORIDE 0.9 % (FLUSH) 0.9 %
10 SYRINGE (ML) INJECTION
Status: DISCONTINUED | OUTPATIENT
Start: 2024-10-21 | End: 2024-10-21 | Stop reason: HOSPADM

## 2024-10-21 RX ORDER — EPINEPHRINE 0.3 MG/.3ML
0.3 INJECTION SUBCUTANEOUS ONCE AS NEEDED
Status: DISCONTINUED | OUTPATIENT
Start: 2024-10-21 | End: 2024-10-21 | Stop reason: HOSPADM

## 2024-10-21 RX ORDER — DIPHENHYDRAMINE HYDROCHLORIDE 50 MG/ML
50 INJECTION INTRAMUSCULAR; INTRAVENOUS ONCE AS NEEDED
Status: DISCONTINUED | OUTPATIENT
Start: 2024-10-21 | End: 2024-10-21 | Stop reason: HOSPADM

## 2024-10-21 RX ADMIN — SODIUM CHLORIDE, PRESERVATIVE FREE 10 ML: 5 INJECTION INTRAVENOUS at 03:10

## 2024-10-21 RX ADMIN — FERRIC CARBOXYMALTOSE INJECTION 750 MG: 50 INJECTION, SOLUTION INTRAVENOUS at 03:10

## 2024-10-21 NOTE — NURSING
Pt tolerated injectafer dose 2 of 2  well.  No adverse reaction noted. IV flushed with NS and D/C per protocol.  Patient left clinic in no acute distress.

## 2024-10-23 ENCOUNTER — ANESTHESIA EVENT (OUTPATIENT)
Dept: SURGERY | Facility: OTHER | Age: 44
End: 2024-10-23
Payer: COMMERCIAL

## 2024-10-23 ENCOUNTER — HOSPITAL ENCOUNTER (OUTPATIENT)
Dept: PREADMISSION TESTING | Facility: OTHER | Age: 44
Discharge: HOME | End: 2024-10-23
Attending: PEDIATRICS
Payer: COMMERCIAL

## 2024-10-23 VITALS
SYSTOLIC BLOOD PRESSURE: 104 MMHG | BODY MASS INDEX: 23.9 KG/M2 | HEIGHT: 64 IN | OXYGEN SATURATION: 100 % | RESPIRATION RATE: 16 BRPM | HEART RATE: 67 BPM | DIASTOLIC BLOOD PRESSURE: 51 MMHG | WEIGHT: 140 LBS | TEMPERATURE: 98 F

## 2024-10-23 DIAGNOSIS — R73.03 PREDIABETES: ICD-10-CM

## 2024-10-23 DIAGNOSIS — N92.0 MENORRHAGIA WITH REGULAR CYCLE: ICD-10-CM

## 2024-10-23 DIAGNOSIS — Z01.818 PREOP TESTING: Primary | ICD-10-CM

## 2024-10-23 LAB
ABO + RH BLD: NORMAL
BASOPHILS # BLD AUTO: 0.08 K/UL (ref 0–0.2)
BASOPHILS NFR BLD: 0.9 % (ref 0–1.9)
BLD GP AB SCN CELLS X3 SERPL QL: NORMAL
DIFFERENTIAL METHOD BLD: ABNORMAL
EOSINOPHIL # BLD AUTO: 0 K/UL (ref 0–0.5)
EOSINOPHIL NFR BLD: 0.4 % (ref 0–8)
ERYTHROCYTE [DISTWIDTH] IN BLOOD BY AUTOMATED COUNT: 29 % (ref 11.5–14.5)
ESTIMATED AVG GLUCOSE: 88 MG/DL (ref 68–131)
HBA1C MFR BLD: 4.7 % (ref 4–5.6)
HCT VFR BLD AUTO: 32.7 % (ref 37–48.5)
HGB BLD-MCNC: 9.2 G/DL (ref 12–16)
IMM GRANULOCYTES # BLD AUTO: 0.02 K/UL (ref 0–0.04)
IMM GRANULOCYTES NFR BLD AUTO: 0.2 % (ref 0–0.5)
LYMPHOCYTES # BLD AUTO: 1.7 K/UL (ref 1–4.8)
LYMPHOCYTES NFR BLD: 20.2 % (ref 18–48)
MCH RBC QN AUTO: 20.7 PG (ref 27–31)
MCHC RBC AUTO-ENTMCNC: 28.1 G/DL (ref 32–36)
MCV RBC AUTO: 74 FL (ref 82–98)
MONOCYTES # BLD AUTO: 0.5 K/UL (ref 0.3–1)
MONOCYTES NFR BLD: 5.3 % (ref 4–15)
NEUTROPHILS # BLD AUTO: 6.2 K/UL (ref 1.8–7.7)
NEUTROPHILS NFR BLD: 73 % (ref 38–73)
NRBC BLD-RTO: 0 /100 WBC
PLATELET # BLD AUTO: 236 K/UL (ref 150–450)
PMV BLD AUTO: 9.5 FL (ref 9.2–12.9)
RBC # BLD AUTO: 4.44 M/UL (ref 4–5.4)
SPECIMEN OUTDATE: NORMAL
WBC # BLD AUTO: 8.51 K/UL (ref 3.9–12.7)

## 2024-10-23 PROCEDURE — 86850 RBC ANTIBODY SCREEN: CPT | Performed by: STUDENT IN AN ORGANIZED HEALTH CARE EDUCATION/TRAINING PROGRAM

## 2024-10-23 PROCEDURE — 86900 BLOOD TYPING SEROLOGIC ABO: CPT | Performed by: STUDENT IN AN ORGANIZED HEALTH CARE EDUCATION/TRAINING PROGRAM

## 2024-10-23 PROCEDURE — 83036 HEMOGLOBIN GLYCOSYLATED A1C: CPT | Performed by: FAMILY MEDICINE

## 2024-10-23 PROCEDURE — 85025 COMPLETE CBC W/AUTO DIFF WBC: CPT | Performed by: ANESTHESIOLOGY

## 2024-10-23 PROCEDURE — 86901 BLOOD TYPING SEROLOGIC RH(D): CPT | Performed by: STUDENT IN AN ORGANIZED HEALTH CARE EDUCATION/TRAINING PROGRAM

## 2024-10-23 RX ORDER — SODIUM CHLORIDE, SODIUM LACTATE, POTASSIUM CHLORIDE, CALCIUM CHLORIDE 600; 310; 30; 20 MG/100ML; MG/100ML; MG/100ML; MG/100ML
INJECTION, SOLUTION INTRAVENOUS CONTINUOUS
OUTPATIENT
Start: 2024-10-23

## 2024-10-23 RX ORDER — LIDOCAINE HYDROCHLORIDE 10 MG/ML
0.5 INJECTION, SOLUTION EPIDURAL; INFILTRATION; INTRACAUDAL; PERINEURAL ONCE
OUTPATIENT
Start: 2024-10-23 | End: 2024-10-23

## 2024-10-23 NOTE — DISCHARGE INSTRUCTIONS
Information to Prepare you for your Surgery    PRE-ADMIT TESTING   [address]  [place] [facility]  [address]   [phone]  - MARTHA    Your surgery has been scheduled at [facility]. We are pleased to have the opportunity to serve you. For Further Information please call [phone].    On the day of surgery please report to Registration on the 1st floor of the [facility].    CONTACT YOUR PHYSICIAN'S OFFICE THE DAY PRIOR TO YOUR SURGERY TO OBTAIN YOUR ARRIVAL TIME.     The evening before surgery do not eat anything after 9 p.m. ( this includes hard candy, chewing gum and mints).  You may only have GATORADE, POWERADE AND WATER  from 9 p.m. until you leave your home.     DRINK AT LEAST 12 OUNCES THE MORNING OF SURGERY    DO NOT DRINK ANY LIQUIDS ON THE WAY TO THE HOSPITAL.      Why does your anesthesiologist allow you to drink Gatorade/Powerade before surgery?    Gatorade/Powerade helps to increase your comfort before surgery and to decrease your nausea after surgery.  The carbohydrates in the Gatorade/Powerade help reduce your body's stress response to surgery.  If you are diabetic, drink only water prior to surgery.       Outpatient Surgery- May allow 2 adults (18 and older)/ Support Persons (1 being the designated ) for all surgical/procedural patients. A breastfeeding mother will be allowed her infant and 2 adult Support Persons. No one under the age of 18 will be allowed in the building.    MEDICATION INSTRUCTIONS: TAKE medications checked off by the Anesthesiologist on your Medication List.      Angiogram Patients: Take medications as instructed by your physician, including aspirin.     Surgery Patients:  If you take ASPIRIN - Your PHYSICIAN/SURGEON will need to inform you IF/OR when you need to stop taking aspirin prior to your surgery.     Starting the week prior to surgery, do not take any medications containing IBUPROFEN or NSAIDS (Advil,  Aleve, BC, Celebrex, Goody's, Ketorolac, Meloxicam, Mobic, Motrin, Naproxen, Toradol, etc).  If you are not sure if you should take a medicine please call your surgeon's office.  You may take Tylenol.    Do Not Wear any make-up (especially eye make-up) to surgery. Please remove any false eyelashes or eyelash extensions. If you arrive the day of surgery with makeup/eyelashes on you will be required to remove prior to surgery. (There is a risk of corneal abrasions if eye makeup/eyelash extensions are not removed)    Leave all valuables at home.   Do Not wear any jewelry or watches, including any metal in body piercings. Jewelry must be removed prior to coming to the hospital.  There is a possibility that rings that are unable to be removed may be cut off if they are on the surgical extremity.    Please remove all hair extensions, wigs, clips and any other metal accessories/ ornaments from your hair.  These items may pose a flammable/fire risk in Surgery and must be removed.    Do not shave your surgical area at least 5 days prior to your surgery. The surgical prep will be performed at the hospital according to Infection Control regulations.    Contact Lens must be removed before surgery. Either do not wear the contact lens or bring a case and solution for storage.  Please bring a container for eyeglasses or dentures as required.  Bring any paperwork your physician has provided, such as consent forms,  history and physicals, doctor's orders, etc.   Bring comfortable clothes that are loose fitting to wear upon discharge. Take into consideration the type of surgery being performed.  Maintain your diet as advised per your physician the day prior to surgery.    Adequate rest the night before surgery is advised.   Park in the Parking lot behind the hospital or in the [employer] [facility] across the street from the parking lot. Parking is complimentary.  If you will be discharged the same day as your procedure, please  arrange for a responsible adult to drive you home or to accompany you if traveling by taxi.   YOU WILL NOT BE PERMITTED TO DRIVE OR TO LEAVE THE HOSPITAL ALONE AFTER SURGERY.   If you are being discharged the same day, it is strongly recommended that you arrange for someone to remain with you for the first 24 hrs following your surgery.    The Surgeon will speak to your family/visitor after your surgery regarding the outcome of your surgery and post op care.  The Surgeon may speak to you after your surgery, but there is a possibility you may not remember the details.  Please check with your family members regarding the conversation with the Surgeon.         Bathing Instructions with Hibiclens:    Shower the evening before and morning of your procedure with Chlorhexidine (Hibiclens)  do not use Chlorhexidine on your face or genitals. Do not get in your eyes.  Wash your face with water and your regular face wash/soap  Use your regular shampoo  Apply Chlorhexidine (Hibiclens) directly on your skin or on a wet washcloth and wash gently. When showering: Move away from the shower stream when applying Chlorhexidine (Hibiclens) to avoid rinsing off too soon.  Rinse thoroughly with warm water  Do not dilute Chlorhexidine (Hibiclens)   Dry off as usual, do not use any deodorant, powder, body lotions, perfume, after shave or cologne.     We strongly recommend whoever is bringing you home be present for discharge instructions.  This will ensure a thorough understanding for your post op home care.    If the patient has fever, cough, or signs/symptoms of Flu or Covid please do not come in for your surgery.  Contact your surgeon and your primary care physician for further instructions.      If applicable, please bring any blood pressure and/or diabetes medications with you the day of surgery.      ThanksKiara RN

## 2024-10-30 ENCOUNTER — ANESTHESIA (OUTPATIENT)
Dept: SURGERY | Facility: OTHER | Age: 44
End: 2024-10-30
Payer: COMMERCIAL

## 2024-10-30 ENCOUNTER — HOSPITAL ENCOUNTER (OUTPATIENT)
Facility: OTHER | Age: 44
Discharge: HOME | End: 2024-10-30
Attending: STUDENT IN AN ORGANIZED HEALTH CARE EDUCATION/TRAINING PROGRAM | Admitting: STUDENT IN AN ORGANIZED HEALTH CARE EDUCATION/TRAINING PROGRAM
Payer: COMMERCIAL

## 2024-10-30 DIAGNOSIS — Z98.890 STATUS POST HYSTEROSCOPY: Primary | ICD-10-CM

## 2024-10-30 DIAGNOSIS — D50.0 IRON DEFICIENCY ANEMIA DUE TO CHRONIC BLOOD LOSS: ICD-10-CM

## 2024-10-30 DIAGNOSIS — N92.0 MENORRHAGIA WITH REGULAR CYCLE: ICD-10-CM

## 2024-10-30 LAB
B-HCG UR QL: NEGATIVE
CTP QC/QA: YES

## 2024-10-30 PROCEDURE — 58558 HYSTEROSCOPY BIOPSY: CPT | Mod: ,,, | Performed by: STUDENT IN AN ORGANIZED HEALTH CARE EDUCATION/TRAINING PROGRAM

## 2024-10-30 PROCEDURE — 25000003 PHARM REV CODE 250: Performed by: STUDENT IN AN ORGANIZED HEALTH CARE EDUCATION/TRAINING PROGRAM

## 2024-10-30 PROCEDURE — 63600175 PHARM REV CODE 636 W HCPCS

## 2024-10-30 PROCEDURE — 36000707: Performed by: STUDENT IN AN ORGANIZED HEALTH CARE EDUCATION/TRAINING PROGRAM

## 2024-10-30 PROCEDURE — 63600175 PHARM REV CODE 636 W HCPCS: Performed by: STUDENT IN AN ORGANIZED HEALTH CARE EDUCATION/TRAINING PROGRAM

## 2024-10-30 PROCEDURE — 37000008 HC ANESTHESIA 1ST 15 MINUTES: Performed by: STUDENT IN AN ORGANIZED HEALTH CARE EDUCATION/TRAINING PROGRAM

## 2024-10-30 PROCEDURE — 71000033 HC RECOVERY, INTIAL HOUR: Performed by: STUDENT IN AN ORGANIZED HEALTH CARE EDUCATION/TRAINING PROGRAM

## 2024-10-30 PROCEDURE — 71000016 HC POSTOP RECOV ADDL HR: Performed by: STUDENT IN AN ORGANIZED HEALTH CARE EDUCATION/TRAINING PROGRAM

## 2024-10-30 PROCEDURE — 71000039 HC RECOVERY, EACH ADD'L HOUR: Performed by: STUDENT IN AN ORGANIZED HEALTH CARE EDUCATION/TRAINING PROGRAM

## 2024-10-30 PROCEDURE — 37000009 HC ANESTHESIA EA ADD 15 MINS: Performed by: STUDENT IN AN ORGANIZED HEALTH CARE EDUCATION/TRAINING PROGRAM

## 2024-10-30 PROCEDURE — 81025 URINE PREGNANCY TEST: CPT | Performed by: ANESTHESIOLOGY

## 2024-10-30 PROCEDURE — 36000706: Performed by: STUDENT IN AN ORGANIZED HEALTH CARE EDUCATION/TRAINING PROGRAM

## 2024-10-30 PROCEDURE — 88305 TISSUE EXAM BY PATHOLOGIST: CPT | Mod: 26,,, | Performed by: PATHOLOGY

## 2024-10-30 PROCEDURE — 58300 INSERT INTRAUTERINE DEVICE: CPT | Mod: ,,, | Performed by: STUDENT IN AN ORGANIZED HEALTH CARE EDUCATION/TRAINING PROGRAM

## 2024-10-30 PROCEDURE — 88305 TISSUE EXAM BY PATHOLOGIST: CPT | Performed by: PATHOLOGY

## 2024-10-30 PROCEDURE — 63600175 PHARM REV CODE 636 W HCPCS: Performed by: ANESTHESIOLOGY

## 2024-10-30 PROCEDURE — 25000003 PHARM REV CODE 250: Performed by: ANESTHESIOLOGY

## 2024-10-30 PROCEDURE — 71000015 HC POSTOP RECOV 1ST HR: Performed by: STUDENT IN AN ORGANIZED HEALTH CARE EDUCATION/TRAINING PROGRAM

## 2024-10-30 PROCEDURE — 27201423 OPTIME MED/SURG SUP & DEVICES STERILE SUPPLY: Performed by: STUDENT IN AN ORGANIZED HEALTH CARE EDUCATION/TRAINING PROGRAM

## 2024-10-30 DEVICE — IMPLANTABLE DEVICE: Type: IMPLANTABLE DEVICE | Site: UTERUS | Status: FUNCTIONAL

## 2024-10-30 RX ORDER — GLUCAGON 1 MG
1 KIT INJECTION
Status: DISCONTINUED | OUTPATIENT
Start: 2024-10-30 | End: 2024-10-30 | Stop reason: HOSPADM

## 2024-10-30 RX ORDER — SODIUM CHLORIDE, SODIUM LACTATE, POTASSIUM CHLORIDE, CALCIUM CHLORIDE 600; 310; 30; 20 MG/100ML; MG/100ML; MG/100ML; MG/100ML
INJECTION, SOLUTION INTRAVENOUS CONTINUOUS
Status: DISCONTINUED | OUTPATIENT
Start: 2024-10-30 | End: 2024-10-30 | Stop reason: HOSPADM

## 2024-10-30 RX ORDER — IBUPROFEN 600 MG/1
600 TABLET ORAL 3 TIMES DAILY
Qty: 30 TABLET | Refills: 0 | Status: SHIPPED | OUTPATIENT
Start: 2024-10-30 | End: 2024-10-30 | Stop reason: HOSPADM

## 2024-10-30 RX ORDER — LIDOCAINE HYDROCHLORIDE 20 MG/ML
INJECTION INTRAVENOUS
Status: DISCONTINUED | OUTPATIENT
Start: 2024-10-30 | End: 2024-10-30

## 2024-10-30 RX ORDER — FENTANYL CITRATE 50 UG/ML
25 INJECTION, SOLUTION INTRAMUSCULAR; INTRAVENOUS EVERY 5 MIN PRN
Status: DISCONTINUED | OUTPATIENT
Start: 2024-10-30 | End: 2024-10-30

## 2024-10-30 RX ORDER — FAMOTIDINE 20 MG/1
20 TABLET, FILM COATED ORAL
Status: COMPLETED | OUTPATIENT
Start: 2024-10-30 | End: 2024-10-30

## 2024-10-30 RX ORDER — KETOROLAC TROMETHAMINE 30 MG/ML
INJECTION, SOLUTION INTRAMUSCULAR; INTRAVENOUS
Status: DISCONTINUED | OUTPATIENT
Start: 2024-10-30 | End: 2024-10-30

## 2024-10-30 RX ORDER — FENTANYL CITRATE 50 UG/ML
INJECTION, SOLUTION INTRAMUSCULAR; INTRAVENOUS
Status: DISCONTINUED | OUTPATIENT
Start: 2024-10-30 | End: 2024-10-30

## 2024-10-30 RX ORDER — MEPERIDINE HYDROCHLORIDE 25 MG/ML
12.5 INJECTION INTRAMUSCULAR; INTRAVENOUS; SUBCUTANEOUS ONCE AS NEEDED
Status: DISCONTINUED | OUTPATIENT
Start: 2024-10-30 | End: 2024-10-30 | Stop reason: HOSPADM

## 2024-10-30 RX ORDER — DEXTROMETHORPHAN HYDROBROMIDE, GUAIFENESIN 5; 100 MG/5ML; MG/5ML
650 LIQUID ORAL EVERY 6 HOURS
Qty: 30 TABLET | Refills: 0 | Status: SHIPPED | OUTPATIENT
Start: 2024-10-30

## 2024-10-30 RX ORDER — HYDROMORPHONE HYDROCHLORIDE 2 MG/ML
0.4 INJECTION, SOLUTION INTRAMUSCULAR; INTRAVENOUS; SUBCUTANEOUS EVERY 5 MIN PRN
Status: DISCONTINUED | OUTPATIENT
Start: 2024-10-30 | End: 2024-10-30 | Stop reason: HOSPADM

## 2024-10-30 RX ORDER — PROPOFOL 10 MG/ML
VIAL (ML) INTRAVENOUS
Status: DISCONTINUED | OUTPATIENT
Start: 2024-10-30 | End: 2024-10-30

## 2024-10-30 RX ORDER — ACETAMINOPHEN 10 MG/ML
INJECTION, SOLUTION INTRAVENOUS
Status: DISCONTINUED | OUTPATIENT
Start: 2024-10-30 | End: 2024-10-30

## 2024-10-30 RX ORDER — OXYCODONE HYDROCHLORIDE 5 MG/1
5 TABLET ORAL
Status: DISCONTINUED | OUTPATIENT
Start: 2024-10-30 | End: 2024-10-30 | Stop reason: HOSPADM

## 2024-10-30 RX ORDER — ONDANSETRON HYDROCHLORIDE 2 MG/ML
4 INJECTION, SOLUTION INTRAVENOUS DAILY PRN
Status: DISCONTINUED | OUTPATIENT
Start: 2024-10-30 | End: 2024-10-30 | Stop reason: HOSPADM

## 2024-10-30 RX ORDER — PHENYLEPHRINE HYDROCHLORIDE 10 MG/ML
INJECTION INTRAVENOUS
Status: DISCONTINUED | OUTPATIENT
Start: 2024-10-30 | End: 2024-10-30

## 2024-10-30 RX ORDER — FENTANYL CITRATE 50 UG/ML
25 INJECTION, SOLUTION INTRAMUSCULAR; INTRAVENOUS EVERY 5 MIN PRN
Status: DISCONTINUED | OUTPATIENT
Start: 2024-10-30 | End: 2024-10-30 | Stop reason: HOSPADM

## 2024-10-30 RX ORDER — DEXAMETHASONE SODIUM PHOSPHATE 4 MG/ML
INJECTION, SOLUTION INTRA-ARTICULAR; INTRALESIONAL; INTRAMUSCULAR; INTRAVENOUS; SOFT TISSUE
Status: DISCONTINUED | OUTPATIENT
Start: 2024-10-30 | End: 2024-10-30

## 2024-10-30 RX ORDER — LIDOCAINE HYDROCHLORIDE 10 MG/ML
0.5 INJECTION, SOLUTION EPIDURAL; INFILTRATION; INTRACAUDAL; PERINEURAL ONCE
Status: DISCONTINUED | OUTPATIENT
Start: 2024-10-30 | End: 2024-10-30 | Stop reason: HOSPADM

## 2024-10-30 RX ORDER — IBUPROFEN 600 MG/1
600 TABLET ORAL EVERY 6 HOURS
Qty: 30 TABLET | Refills: 0 | Status: SHIPPED | OUTPATIENT
Start: 2024-10-30

## 2024-10-30 RX ORDER — SODIUM CHLORIDE 9 MG/ML
INJECTION, SOLUTION INTRAVENOUS CONTINUOUS
Status: DISCONTINUED | OUTPATIENT
Start: 2024-10-30 | End: 2024-10-30 | Stop reason: HOSPADM

## 2024-10-30 RX ORDER — ONDANSETRON HYDROCHLORIDE 2 MG/ML
INJECTION, SOLUTION INTRAVENOUS
Status: DISCONTINUED | OUTPATIENT
Start: 2024-10-30 | End: 2024-10-30

## 2024-10-30 RX ORDER — DEXTROMETHORPHAN HYDROBROMIDE, GUAIFENESIN 5; 100 MG/5ML; MG/5ML
650 LIQUID ORAL EVERY 6 HOURS
Qty: 30 TABLET | Refills: 0 | Status: SHIPPED | OUTPATIENT
Start: 2024-10-30 | End: 2024-10-30

## 2024-10-30 RX ORDER — SODIUM CHLORIDE 9 MG/ML
INJECTION, SOLUTION INTRAVENOUS CONTINUOUS PRN
Status: DISCONTINUED | OUTPATIENT
Start: 2024-10-30 | End: 2024-10-30

## 2024-10-30 RX ORDER — SODIUM CHLORIDE 0.9 % (FLUSH) 0.9 %
3 SYRINGE (ML) INJECTION
Status: DISCONTINUED | OUTPATIENT
Start: 2024-10-30 | End: 2024-10-30 | Stop reason: HOSPADM

## 2024-10-30 RX ADMIN — FAMOTIDINE 20 MG: 20 TABLET, FILM COATED ORAL at 06:10

## 2024-10-30 RX ADMIN — LIDOCAINE HYDROCHLORIDE 75 MG: 20 INJECTION, SOLUTION INTRAVENOUS at 07:10

## 2024-10-30 RX ADMIN — KETOROLAC TROMETHAMINE 30 MG: 30 INJECTION, SOLUTION INTRAMUSCULAR; INTRAVENOUS at 07:10

## 2024-10-30 RX ADMIN — ONDANSETRON HYDROCHLORIDE 4 MG: 2 INJECTION INTRAMUSCULAR; INTRAVENOUS at 06:10

## 2024-10-30 RX ADMIN — SODIUM CHLORIDE: 0.9 INJECTION, SOLUTION INTRAVENOUS at 06:10

## 2024-10-30 RX ADMIN — PHENYLEPHRINE HYDROCHLORIDE 100 MCG: 10 INJECTION INTRAVENOUS at 07:10

## 2024-10-30 RX ADMIN — FENTANYL CITRATE 25 MCG: 50 INJECTION INTRAMUSCULAR; INTRAVENOUS at 08:10

## 2024-10-30 RX ADMIN — LEVONORGESTREL 1 INTRA UTERINE DEVICE: 52 INTRAUTERINE DEVICE INTRAUTERINE at 07:10

## 2024-10-30 RX ADMIN — DEXAMETHASONE SODIUM PHOSPHATE 4 MG: 4 INJECTION, SOLUTION INTRAMUSCULAR; INTRAVENOUS at 07:10

## 2024-10-30 RX ADMIN — ACETAMINOPHEN 1000 MG: 10 INJECTION INTRAVENOUS at 07:10

## 2024-10-30 RX ADMIN — FENTANYL CITRATE 100 MCG: 50 INJECTION, SOLUTION INTRAMUSCULAR; INTRAVENOUS at 06:10

## 2024-10-30 RX ADMIN — OXYCODONE HYDROCHLORIDE 5 MG: 5 TABLET ORAL at 08:10

## 2024-10-30 RX ADMIN — PROPOFOL 200 MG: 10 INJECTION, EMULSION INTRAVENOUS at 07:10

## 2024-10-30 RX ADMIN — GLYCOPYRROLATE 0.2 MG: 0.2 INJECTION, SOLUTION INTRAMUSCULAR; INTRAVITREAL at 07:10

## 2024-10-30 NOTE — OP NOTE
OPERATIVE REPORT FOR HYSTEROSCOPE, D&C    DATE OF PROCEDURE:     PREOPERATIVE DIAGNOSIS:   1. Endometrial Polyps  2. AUB    POSTOPERATIVE DIAGNOSIS:   1. S/P Hysteroscopy/ D&C/Polypectomy/IUD Insertion  2. Endometrial Polyps   3. IUD     PROCEDURE:  1. Hysteroscopy  2. Dilation and curettage     SURGEON: Juan Wood MD    ASSISTANT: Viji Wen MD PGY1     ANESTHESIA: General    COMPLICATIONS: None    EBL: minimal     IV FLUIDS: see anesthesia note    URINE OUTPUT: 100  cc drained via in-and-out catheterization prior to procedure    HYSTEROSCOPY FLUID DEFICIT: 80cc    FINDINGS:   1. Uterus sounded to 9 cm  2. Cervix descended to the vaginal introitus with gentle traction on the single-tooth tenaculum. Would be a vaginal hysterectomy candidate.   3. Cervical os dilated to 16 Pashto by the tawny dilators  4. Hysteroscopic findings include: normal cervical canal, normal tubal ostia bilaterally, normal uterine cavity, 1cm x 1cm poly along right posterolateral wall of uterus   5. Polypectomy performed using Myosure Lite device   6. Sharp Curettage performed and  specimens obtained and sent to pathology   7. Mirena IUD inserted   8. Hemostasis of single tooth tenaculum sites obtained at the end of the procedure via silver nitrate stick   9. Fluid deficit 80cc    MIRENA IUD  Lot #: [id number]  EXP: 2026/Oct     SPECIMENS:  1. Endometrial curettage  2. Endometrial polyp    PROCEDURE:   Patient was taken to the operating room where general anesthesia was administered and found to be adequate.  She was placed in the dorsal lithotomy position using Evan stirrups, then prepped and draped in the usual sterile fashion. Bladder was drained via in-and-out catheterization prior to procedure.  A surgical timeout was performed with patient's name, date of birth, procedure to be performed, and allergies verbalized. All OR staff in agreement. No preoperative antibiotics were administered as none were indicated.    Attention  was then turned to the vagina where 2 right angles were inserted to visualize the cervix. The anterior lip of the cervix was grasped with a single tooth tenaculum. The cervix descended to the vaginal introitus with gentle traction on the single-tooth tenaculum. The uterus was sounded to 9 cm with the uterine sound, and tawny dilators were used to dilate the cervix to 16 fr. The 5mm hysteroscope was then inserted into the cervical os and and advanced through the cervical canal until the uterine cavity was directly visualized. The uterus was distended with normal saline . The endometrium was inspected and found to be proliferative.  The tubal ostia were identified without difficulty, and appeared normal. A polyp was found on the posterior uterine wall, and removed using the Myosure Lite The hysteroscope was withdrawn without difficulty.     The uterus was then curetted in a clockwise fashion until gritty feeling was noted in all aspects of the uterus. The endometrial scrapings were sent to pathology. The Mirena insertion device was then set to 9cm, and placed within the uterine cavity using standard procedure. The Mirena IUD was deployed, and strings were cut 3cm from the cervical os. The single tooth tenaculum was removed and hemostasis was noted at the puncture sites in the cervix following use of silver nitrate.     Sponge and instrument counts were correct x 2. The patient tolerated the procedure well and was awakened without difficulty. She was taken to the recovery room in stable condition. Antibiotics were not indicated for this case.      Kitty Wen MD (Mary)   Obstetrics and Gynecology, PGY1     Infectious Disease

## 2024-10-30 NOTE — DISCHARGE SUMMARY
[facility]  Brief Op Note  Short Stay    Admit Date: 10/30/2024    Attending Physician: Juan Wood MD     Surgery Date: 10/30/2024     Surgeons and Role:     * Juan Wood MD - Primary    Assisting Physician: Viji Wen MD PGY1     Pre-op Diagnosis:  Menorrhagia with regular cycle [N92.0]  Endometrial polyp [N84.0]    Post-op Diagnosis:  Post-Op Diagnosis Codes:     * Menorrhagia with regular cycle [N92.0]     * Endometrial polyp [N84.0]    Procedure(s) (LRB):  HYSTEROSCOPY, WITH DILATION AND CURETTAGE OF UTERUS (N/A)  INSERTION, INTRAUTERINE DEVICE (N/A)  POLYPECTOMY    Anesthesia: Choice    Findings/Key Components: endometrial polyp, proliferative endometrium    Estimated Blood Loss: minimal          Specimens:   Specimen (24h ago, onward)       Start     Ordered    10/30/24 0726  Specimen to Pathology, Surgery Gynecology and Obstetrics  Once        Comments: Pre-op Diagnosis: Menorrhagia with regular cycle [N92.0]Endometrial polyp [N84.0]Procedure(s):HYSTEROSCOPY, WITH DILATION AND CURETTAGE OF UTERUSINSERTION, INTRAUTERINE DEVICE Number of specimens: 2Name of specimens: 1. Endometrial curettage2. Endometrial polyp     References:    Click here for ordering Quick Tip   Question Answer Comment   Procedure Type: Gynecology and Obstetrics    Specimen Class: Routine/Screening    Release to patient Immediate        10/30/24 0737                        Discharge Note    Discharge Date: 10/30/2024    Discharge Provider: Viji Wen    Diagnoses:  Active Hospital Problems    Diagnosis  POA    *Status post hysteroscopy, polpectomy, and IUD insertion [Z98.890]  Not Applicable      Resolved Hospital Problems   No resolved problems to display.       Discharged Condition: good    Hospital Course:   Patient was admitted for outpatient procedure as above, and tolerated the procedure well with no complications. Please see operative report for further details. Following the procedure,  the patient was awakened from anesthesia and transferred to the recovery area in stable condition. She was discharged to home once ambulating, voiding, tolerating PO intake, and pain was well-controlled. Patient was given routine post-op instructions and prescriptions for pain medication to take as needed. Patient instructed to follow up with Dr. Wood in 4 weeks.    Final Diagnoses: Same as principal problem.    Disposition: Home or Self Care    Follow up/Patient Instructions:    Medications:  Reconciled Home Medications:      Medication List        START taking these medications      acetaminophen 650 MG Tbsr  Commonly known as: TYLENOL  Take 1 tablet (650 mg total) by mouth every 6 (six) hours. Alternate between ibuprofen and tylenol every 3 hours. For example: @0800: ibuprofen 600mg @1100: tylenol 650mg @1400: ibuprofen 600mg @1700: tylenol 650 mg @2000: ibuprofen 600mg     ibuprofen 600 MG tablet  Commonly known as: ADVIL,MOTRIN  Take 1 tablet (600 mg total) by mouth every 6 (six) hours. Alternate between ibuprofen and tylenol every 3 hours. For example: @0800: ibuprofen 600mg @1100: tylenol 650mg @1400: ibuprofen 600mg @1700: tylenol 650 mg @2000: ibuprofen 600mg            CONTINUE taking these medications      * acyclovir 5% 5 % ointment  Commonly known as: ZOVIRAX  Apply topically 5 (five) times daily.     * acyclovir 400 MG tablet  Commonly known as: ZOVIRAX  Take 1 tablet (400 mg total) by mouth 2 (two) times daily.     buPROPion 150 MG TB24 tablet  Commonly known as: WELLBUTRIN XL  Take 1 tablet (150 mg total) by mouth once daily.     semaglutide 0.25 mg or 0.5 mg (2 mg/3 mL) pen injector  Commonly known as: OZEMPIC  Inject 0.5 mg into the skin every 7 days. Last dose was 10/16/24     traZODone 100 MG tablet  Commonly known as: DESYREL  Take 1 tablet (100 mg total) by mouth every evening.     vitamin D 1000 units Tab  Commonly known as: VITAMIN D3  Take 1 tablet (1,000 Units total) by mouth once  daily.           * This list has 2 medication(s) that are the same as other medications prescribed for you. Read the directions carefully, and ask your doctor or other care provider to review them with you.                STOP taking these medications      tranexamic acid 650 mg tablet  Commonly known as: LYSTEDA            Discharge Procedure Orders   Diet Adult Regular     Pelvic Rest   Order Comments: Pelvic rest until follow up visit. Nothing in vagina -no sex, tampons, douching, etc.     No dressing needed     Notify your health care provider if you experience any of the following:  temperature >100.4     Notify your health care provider if you experience any of the following:  persistent nausea and vomiting or diarrhea     Notify your health care provider if you experience any of the following:  severe uncontrolled pain     Notify your health care provider if you experience any of the following:  redness, tenderness, or signs of infection (pain, swelling, redness, odor or green/yellow discharge around incision site)     Notify your health care provider if you experience any of the following:  difficulty breathing or increased cough     Notify your health care provider if you experience any of the following:  severe persistent headache     Notify your health care provider if you experience any of the following:  worsening rash     Notify your health care provider if you experience any of the following:  persistent dizziness, light-headedness, or visual disturbances     Notify your health care provider if you experience any of the following:  increased confusion or weakness     Notify your health care provider if you experience any of the following:   Order Comments: Vaginal bleeding greater than 2 pads per 1 hour for 2 consecutive hours     Activity as tolerated      Follow-up Information       Juan Wood MD Follow up in 1 month(s).    Specialty: Obstetrics and Gynecology  Why: for post-op  visit  Contact information:  [address] [place] [id number]  [phone]                           [facility] Wen MD   Obstetrics and Gynecology, PGY1

## 2024-10-30 NOTE — DISCHARGE INSTRUCTIONS
Home Care Instruction D&C Hysteroscopy             ACTIVITY LEVEL:  If you received sedation and/or an anesthetic, you may feel sleepy for several hours. Rest until you feel more  awake. Gradually resume your normal activities.    DIET:  At home, continue with liquids. If there is no nausea, you may eat a soft diet and gradually resume a regular diet.    BATHING:  You may shower  as desired in one day.  You should avoid tub baths, hot tubs and swimming pools until seen by your physician for a post-op follow up.    CARE:  You can expect watery or bloody vaginal discharge for several days. Do not use tampons, please only use pads. Sexual activity is restricted as advised by your doctor.    MEDICATIONS:  You will receive instructions for any pain and/or antibiotic prescriptions. Pain medication should be taken only if needed and as directed. Antibiotics, if ordered, should be taken as directed until the entire prescription is completed.      WHEN TO CALL THE DOCTOR:   For any heavy vaginal bleeding   Fever over 101°F (38.4°C)   Any lower abdominal pain not relieved by the pain mediation    FOR EMERGENCIES:  If any unusual problems or difficulties occur, contact Dr. Wood or the GYN resident at [phone] (page ) or the Clinic office, [phone].

## 2024-10-30 NOTE — H&P
Sarah Padron is 44 y.o.  presenting for scheduled Hysteroscopy D&C with IUD placement.    Pt reports feeling well this morning. Otherwise denies any complaints. Has not taken any medications prior to arrival this am.     Temp:  [98.1 °F (36.7 °C)] 98.1 °F (36.7 °C)  Pulse:  [77] 77  Resp:  [16] 16  SpO2:  [99 %] 99 %  BP: (109)/(59) 109/59    General: NAD, alert, oriented, cooperative  HEENT: NCAT, EOM grossly intact  Lungs: Normal WOB  Heart: regular rate  Abdomen: soft, nondistended, nontender, no rebound or guarding. Four 5mm scars noted along abdomen consistent with previous laparoscopic surgery     Plan  Consents in chart.  All questions answered and concerns addressed.   Mirena IUD acquired   To OR for planned procedure.    Kitty Wen MD (Mary)   Obstetrics and Gynecology, PGY1    Pt denies any changes in medications or overall health status since original H&P by Dr. Juan Wood on 10/11/24:       History of Present Illness:  Sarah Padron is a 43 yo  female here to discuss management of AUB and endometrial polyp. She has previously seen Isabel Villagomez NP for this and recently underwent EMBx. She presents today to discuss surgical management.      Final Pathologic Diagnosis ENDOMETRIUM, BIOPSY:  - Early secretory endometrium.  - Stromal breakdown.  - No hyperplasia, atypia, or malignancy.      Periods every 25-30 days, last 5-6 days with 2 heavy flow days - super plus tampon every 3-4 hours.   Up until the past year, she would just use super tampon first day and change out every 6 hours but within the last 6 months, periods have been heavier   She is currently using TXA during cycles w/ mild relief.      TVUS FINDINGS:  Uterus measures 9.5 x 5.0 x 5.9 cm, endometrium 20.2 mm.  There is a possible endometrial polyp measuring 0.9 x 0.3 x 0.5 cm.        Review of Systems:  Review of Systems   Constitutional:  Negative for chills and fever.   Respiratory:  Negative  for shortness of breath.    Cardiovascular:  Negative for chest pain.   Gastrointestinal:  Negative for abdominal pain, nausea and vomiting.   Endocrine: Negative for hot flashes.   Genitourinary:  Positive for menorrhagia. Negative for menstrual problem.   Integumentary:  Negative for breast mass, nipple discharge and breast skin changes.   Neurological:  Negative for headaches.   Hematological:  Does not bruise/bleed easily.   Psychiatric/Behavioral:  Negative for depression.    Breast: Negative for mass, mastodynia, nipple discharge and skin changes        OBJECTIVE:      Physical Exam:  Physical Exam  Constitutional:       Appearance: Normal appearance.   HENT:      Head: Normocephalic and atraumatic.   Eyes:      Conjunctiva/sclera: Conjunctivae normal.      Pupils: Pupils are equal, round, and reactive to light.   Cardiovascular:      Rate and Rhythm: Normal rate and regular rhythm.   Pulmonary:      Effort: Pulmonary effort is normal. No respiratory distress.   Musculoskeletal:         General: Normal range of motion.      Cervical back: Normal range of motion.   Neurological:      Mental Status: She is alert.   Psychiatric:         Mood and Affect: Mood normal.         Thought Content: Thought content normal.            ASSESSMENT:          ICD-10-CM ICD-9-CM     1. Menorrhagia with regular cycle  N92.0 626.2 Device Authorization Order       2. Endometrial polyp  N84.0 621.0                  Plan:      Sarah was seen today for consult.     Diagnoses and all orders for this visit:     Menorrhagia with regular cycle  -     Device Authorization Order     Endometrial polyp     Reviewed pathology and radiology findings with patient  Discussed recommendation for endometrial polyp removal via operative hysteroscopy D&C  Discussed placement of IUD vs endometrial ablation as well  Patient desires to have IUD placed  Consent forms reviewed and signed with patient today  Tentatively plan for surgery on 10/30          Orders Placed This Encounter   Procedures    Device Authorization Order         No follow-ups on file.     Juan Wood     Answers submitted by the patient for this visit:  Vaginal Bleeding Questionnaire  (Submitted on 10/11/2024)  Chief Complaint: Vaginal bleeding  Chronicity: recurrent  Onset: more than 1 month ago  Frequency: rarely  Progression since onset: rapidly worsening  Pain severity: moderate  Affected side: both  Pregnant now?: No  abdominal pain: No  anorexia: No  back pain: No  chills: No  constipation: No  diarrhea: No  discolored urine: No  dysuria: No  fever: No  flank pain: No  frequency: No  headaches: No  hematuria: No  nausea: No  painful intercourse: No  rash: No  urgency: No  vomiting: No  Vaginal bleeding: lighter than menses  Passing clots?: No  Passing tissue?: No  Aggravated by: nothing  treatments tried: acetaminophen  Improvement on treatment: no relief  Sexual activity: sexually active  Partner with STD symptoms: yes  Birth control: vasectomy  Menstrual history: regular  STD: Yes  abdominal surgery: Yes   section: No  Ectopic pregnancy: No  Endometriosis: No  herpes simplex: Yes  gynecological surgery: No  menorrhagia: Yes  metrorrhagia: No  miscarriage: No  ovarian cysts: Yes  perineal abscess: No  PID: No  terminated pregnancy: No  vaginosis: No

## 2024-10-30 NOTE — PLAN OF CARE
Sarah Padron has met all discharge criteria from Phase II. Vital Signs are stable, ambulating  without difficulty. Discharge instructions given, patient verbalized understanding. Discharged from facility via wheelchair in stable condition.

## 2024-10-31 VITALS
HEIGHT: 64 IN | DIASTOLIC BLOOD PRESSURE: 50 MMHG | BODY MASS INDEX: 23.9 KG/M2 | OXYGEN SATURATION: 100 % | WEIGHT: 140 LBS | SYSTOLIC BLOOD PRESSURE: 92 MMHG | HEART RATE: 63 BPM | RESPIRATION RATE: 18 BRPM | TEMPERATURE: 98 F

## 2024-11-06 LAB
FINAL PATHOLOGIC DIAGNOSIS: NORMAL
GROSS: NORMAL
Lab: NORMAL

## 2024-11-07 ENCOUNTER — PATIENT MESSAGE (OUTPATIENT)
Dept: OBSTETRICS AND GYNECOLOGY | Facility: CLINIC | Age: 44
End: 2024-11-07
Payer: COMMERCIAL

## 2024-11-18 ENCOUNTER — LAB VISIT (OUTPATIENT)
Dept: LAB | Facility: HOSPITAL | Age: 44
End: 2024-11-18
Attending: FAMILY MEDICINE
Payer: COMMERCIAL

## 2024-11-18 DIAGNOSIS — F10.21 ACUTE ALCOHOLIC INTOXICATION IN ALCOHOLISM, IN REMISSION: Primary | ICD-10-CM

## 2024-11-18 DIAGNOSIS — F10.21 ACUTE ALCOHOLIC INTOXICATION IN ALCOHOLISM, IN REMISSION: ICD-10-CM

## 2024-11-18 PROCEDURE — 80321 ALCOHOLS BIOMARKERS 1OR 2: CPT | Performed by: FAMILY MEDICINE

## 2024-11-18 PROCEDURE — 36415 COLL VENOUS BLD VENIPUNCTURE: CPT | Performed by: FAMILY MEDICINE

## 2024-11-20 LAB
CLINICAL BIOCHEMIST REVIEW: NORMAL
PLPETH BLD-MCNC: <10 NG/ML
POPETH BLD-MCNC: 10 NG/ML

## 2024-11-26 ENCOUNTER — OFFICE VISIT (OUTPATIENT)
Dept: OBSTETRICS AND GYNECOLOGY | Facility: CLINIC | Age: 44
End: 2024-11-26
Payer: COMMERCIAL

## 2024-11-26 VITALS
HEIGHT: 64 IN | WEIGHT: 143.94 LBS | BODY MASS INDEX: 24.57 KG/M2 | SYSTOLIC BLOOD PRESSURE: 106 MMHG | DIASTOLIC BLOOD PRESSURE: 74 MMHG

## 2024-11-26 DIAGNOSIS — Z98.890 POSTOPERATIVE STATE: Primary | ICD-10-CM

## 2024-11-26 PROCEDURE — 99999 PR PBB SHADOW E&M-EST. PATIENT-LVL III: CPT | Mod: PBBFAC,,, | Performed by: STUDENT IN AN ORGANIZED HEALTH CARE EDUCATION/TRAINING PROGRAM

## 2024-11-26 NOTE — PROGRESS NOTES
Gynecology    SUBJECTIVE:     Chief Complaint: Post-op Evaluation       History of Present Illness:  Sarah Padron is a 45 yo here today for post-op visit. She is s/p hysteroscopy D&C with endometrial polyp resection and IUD placement for the management of AUB on 10/30/2024.    Final Pathologic Diagnosis 1. ENDOMETRIUM, CURETTAGE:  - Benign endometrial polyp.  - Background dyssynchronous secretory endometrium.  - No hyperplasia, atypia, malignancy.    2. ENDOMETRIUM, POLYP, EXCISION:  - Benign endometrial polyp.  - No hyperplasia, atypia, or malignancy.       Today, she reports she is doing well. She states she had one menstrual cycle since surgery, which was still on the heavy side, but since then she has only had light spotting requiring panty liner.    Review of Systems:  Review of Systems   Constitutional:  Negative for chills and fever.   Respiratory:  Negative for shortness of breath.    Cardiovascular:  Negative for chest pain.   Gastrointestinal:  Negative for abdominal pain, nausea and vomiting.   Endocrine: Negative for hot flashes.   Genitourinary:  Negative for menstrual problem.   Integumentary:  Negative for breast mass, nipple discharge and breast skin changes.   Neurological:  Negative for headaches.   Hematological:  Does not bruise/bleed easily.   Psychiatric/Behavioral:  Negative for depression.    Breast: Negative for mass, mastodynia, nipple discharge and skin changes       OBJECTIVE:     Physical Exam:  Physical Exam  Constitutional:       Appearance: Normal appearance.   HENT:      Head: Normocephalic and atraumatic.   Eyes:      Conjunctiva/sclera: Conjunctivae normal.      Pupils: Pupils are equal, round, and reactive to light.   Cardiovascular:      Rate and Rhythm: Normal rate and regular rhythm.   Pulmonary:      Effort: Pulmonary effort is normal. No respiratory distress.   Abdominal:      General: Abdomen is flat. There is no distension.      Palpations: Abdomen is soft.       Tenderness: There is no abdominal tenderness.   Genitourinary:     General: Normal vulva.      Vagina: Vaginal discharge (bloody discharge) present. No tenderness or bleeding.      Cervix: No cervical motion tenderness or friability.      Comments: IUD strings visualized 1 cm protruding from cervical os  Musculoskeletal:         General: Normal range of motion.      Cervical back: Normal range of motion.   Neurological:      Mental Status: She is alert.   Psychiatric:         Mood and Affect: Mood normal.         Thought Content: Thought content normal.         ASSESSMENT:       ICD-10-CM ICD-9-CM    1. Postoperative state  Z98.890 V45.89              Plan:      Sarah was seen today for post-op evaluation.    Diagnoses and all orders for this visit:    Postoperative state  - patient doing well today, pathology results again reviewed  - IUD strings short, but still 1 cm protruding from cervical os  - Discussed expectations of spotting up to 6 months after placement  - RTC for annual visit or sooner if needed      Juan Wood

## 2024-12-03 ENCOUNTER — TELEPHONE (OUTPATIENT)
Dept: URGENT CARE | Facility: CLINIC | Age: 44
End: 2024-12-03
Payer: COMMERCIAL

## 2024-12-03 NOTE — TELEPHONE ENCOUNTER
Received Email from  Workers Comp:  [employer]. [address] Report Of Injury Or Illness and also the [place] [employer] Employee Record & Injury, Illness, Or Exposure Progress Note from Employee Health. [employer]

## 2024-12-20 NOTE — PROGRESS NOTES
PATIENT: Sarah Padron  MRN: [id number]  DATE: 12/23/2024    Diagnosis:   1. Iron deficiency anemia due to chronic blood loss    2. History of Yogesh-en-Y gastric bypass    3. Menorrhagia with regular cycle        Chief Complaint: iron deficiency anemia      Oncologic History:      Oncologic History     Oncologic Treatment     Pathology       Telemedicine visit:  The patient location is: home  The chief complaint leading to consultation is: iron deficiency anemia    Visit type: audiovisual    Face to Face time with patient: 15 minutes  20 minutes of total time spent on the encounter, which includes face to face time and non-face to face time preparing to see the patient (eg, review of tests), Obtaining and/or reviewing separately obtained history, Documenting clinical information in the electronic or other health record, Independently interpreting results (not separately reported) and communicating results to the patient/family/caregiver, or Care coordination (not separately reported).     Each patient to whom he or she provides medical services by telemedicine is:  (1) informed of the relationship between the physician and patient and the respective role of any other health care provider with respect to management of the patient; and (2) notified that he or she may decline to receive medical services by telemedicine and may withdraw from such care at any time.    Notes: see below    Subjective:    History of Present Illness: Ms. Padron is a 44 y.o. female who presented in September 2024 for evaluation and management of iron deficiency anemia. She was referred by nurse practitioner Jeni Villagomez.    - labs since 2016 reveal a mild-to-moderate microcytic anemia  - iron studies (9/9/24) revealed a decreased ferritin/iron/saturated iron with elevated total iron binding capacity.  - she had gastric bypass in 2014.  - she endorses menorrhagia over the past 6 months.      Interval history:  - she presents for a  follow-up appointment for her iron deficiency anemia.  - she received ferric carboxymaltose x 2 doses in October 2024.  - she reports significant improvement in her fatigue. She denies shortness of breath, chest pain, nausea, vomiting, diarrhea, constipation.  - she missed her labs due to work.      Past medical, surgical, family, and social histories have been reviewed and updated below.    Past Medical History: No past medical history on file.    Past Surgical History:   Past Surgical History:   Procedure Laterality Date    ABDOMINAL SURGERY      AUGMENTATION OF BREAST Bilateral 2009    silicone implants and a lift    BREAST SURGERY      lift    CHOLECYSTECTOMY      DIAGNOSTIC LAPAROSCOPY N/A 05/18/2023    Procedure: LAPAROSCOPY, DIAGNOSTIC;  Surgeon: Davy Barnes MD;  Location: [address];  Service: General;  Laterality: N/A;  REPAIR INTERNAL HERNIA    DIAGNOSTIC LAPAROSCOPY N/A 07/05/2023    Procedure: LAPAROSCOPY, DIAGNOSTIC with revision of JJ anastomosis;  Surgeon: Davy Barnes MD;  Location: [address];  Service: General;  Laterality: N/A;    DIAGNOSTIC LAPAROSCOPY N/A 09/24/2023    Procedure: LAPAROSCOPY, DIAGNOSTIC;  Surgeon: Adal Pineda Jr., MD;  Location: [facility] OR [address] [place] [address];  Service: General;  Laterality: N/A;    EYE SURGERY Right     cataract    GASTRIC BYPASS      HERNIA REPAIR N/A 01/01/2016    internal    HYSTEROSCOPY WITH DILATION AND CURETTAGE OF UTERUS N/A 10/30/2024    Procedure: HYSTEROSCOPY, WITH DILATION AND CURETTAGE OF UTERUS;  Surgeon: Juan Wood MD;  Location: [facility] OR;  Service: OB/GYN;  Laterality: N/A;    INTRAUTERINE DEVICE INSERTION N/A 10/30/2024    Procedure: INSERTION, INTRAUTERINE DEVICE;  Surgeon: Juan Wood MD;  Location: [facility] OR;  Service: OB/GYN;  Laterality: N/A;    lap band removal  07/09/2014    LAPAROSCOPIC GASTRIC BANDING      LAPAROSCOPIC GASTRIC BANDING  01/01/2008    revision    LAPAROSCOPIC REPAIR OF INCISIONAL  HERNIA  09/24/2023    Procedure: REPAIR, HERNIA, INCISIONAL, LAPAROSCOPIC;  Surgeon: Adal Pineda Jr., MD;  Location: [address];  Service: General;;    POLYPECTOMY  10/30/2024    Procedure: POLYPECTOMY;  Surgeon: Juan Wood MD;  Location: [facility];  Service: OB/GYN;;       Family History:   Family History   Problem Relation Name Age of Onset    Hypertension Mother      Hypertension Father      Obesity Father         Social History:  reports that she has never smoked. She has never used smokeless tobacco. She reports current alcohol use. She reports that she does not use drugs.    Allergies:  Review of patient's allergies indicates:   Allergen Reactions    Dilaudid [hydromorphone] Itching    Meperidine Itching and Nausea And Vomiting     Other reaction(s): Other (See Comments)       Medications:  Current Outpatient Medications   Medication Sig Dispense Refill    acyclovir (ZOVIRAX) 400 MG tablet Take 1 tablet (400 mg total) by mouth 2 (two) times daily. 60 tablet 11    acyclovir 5% (ZOVIRAX) 5 % ointment Apply topically 5 (five) times daily. 30 g 0    buPROPion (WELLBUTRIN XL) 150 MG TB24 tablet Take 1 tablet (150 mg total) by mouth once daily. 90 tablet 3    semaglutide (OZEMPIC) 0.25 mg or 0.5 mg (2 mg/3 mL) pen injector Inject 0.5 mg into the skin every 7 days. Last dose was 10/16/24      traZODone (DESYREL) 100 MG tablet Take 1 tablet (100 mg total) by mouth every evening. 90 tablet 3    vitamin D (VITAMIN D3) 1000 units Tab Take 1 tablet (1,000 Units total) by mouth once daily.       No current facility-administered medications for this visit.       Review of Systems   Constitutional:  Positive for fatigue (improved).   HENT:  Negative for sore throat.    Eyes:  Negative for visual disturbance.   Respiratory:  Negative for cough and shortness of breath.    Cardiovascular:  Negative for chest pain.   Gastrointestinal:  Negative for abdominal pain, constipation, diarrhea, nausea and  vomiting.   Genitourinary:  Positive for menstrual problem. Negative for dysuria.   Musculoskeletal:  Negative for back pain.   Skin:  Negative for rash.   Neurological:  Negative for headaches.   Hematological:  Negative for adenopathy.   Psychiatric/Behavioral:  The patient is not nervous/anxious.        ECOG Performance Status:   ECOG SCORE    0 - Fully active-able to carry on all pre-disease performance without restriction         Objective:      Vitals: There were no vitals filed for this visit.  BMI: There is no height or weight on file to calculate BMI.  Deferred due to telemedicine visit.    Physical Exam  Deferred due to telemedicine visit.    Laboratory Data:  Labs have been reviewed.    Lab Results   Component Value Date    WBC 8.51 10/23/2024    HGB 9.2 (L) 10/23/2024    HCT 32.7 (L) 10/23/2024    MCV 74 (L) 10/23/2024     10/23/2024     Lab Results   Component Value Date    FERRITIN 14 (L) 09/25/2024     Lab Results   Component Value Date    IRON 18 (L) 09/25/2024    TRANSFERRIN 431 (H) 09/25/2024    TIBC 638 (H) 09/25/2024    FESATURATED 3 (L) 09/25/2024            Imaging:    Assessment:       1. Iron deficiency anemia due to chronic blood loss    2. History of Yogesh-en-Y gastric bypass    3. Menorrhagia with regular cycle           Plan:     Iron deficiency anemia / menorrhagia / history of gastric bypass  - I have reviewed her chart  - labs since 2016 reveal a mild-to-moderate microcytic anemia  - iron studies (9/9/24) revealed a decreased ferritin/iron/saturated iron with elevated total iron binding capacity.  - she endorses menorrhagia over the past 6 months.  - she had gastric bypass in 2014. So, she may have decreased absorption of iron as well.  - she is taking oral iron.  - she received ferric carboxymaltose x 2 doses in October 2024.  - she missed her labs due to work.  - schedule labs tomorrow, 3 months, 6 months at [facility]  - return to clinic in 6 months with repeat labs.     -  return to clinic in 6 months with repeat labs.      Rishi Mendoza M.D.  Hematology/Oncology  [facility] - [address], [address]  [place] [address]  Phone: [phone]  Fax: [phone]

## 2024-12-23 ENCOUNTER — OFFICE VISIT (OUTPATIENT)
Dept: HEMATOLOGY/ONCOLOGY | Facility: CLINIC | Age: 44
End: 2024-12-23
Payer: COMMERCIAL

## 2024-12-23 DIAGNOSIS — Z98.84 HISTORY OF ROUX-EN-Y GASTRIC BYPASS: ICD-10-CM

## 2024-12-23 DIAGNOSIS — N92.0 MENORRHAGIA WITH REGULAR CYCLE: ICD-10-CM

## 2024-12-23 DIAGNOSIS — D50.0 IRON DEFICIENCY ANEMIA DUE TO CHRONIC BLOOD LOSS: Primary | ICD-10-CM

## 2024-12-23 PROCEDURE — 1160F RVW MEDS BY RX/DR IN RCRD: CPT | Mod: CPTII,95,, | Performed by: INTERNAL MEDICINE

## 2024-12-23 PROCEDURE — 3044F HG A1C LEVEL LT 7.0%: CPT | Mod: CPTII,95,, | Performed by: INTERNAL MEDICINE

## 2024-12-23 PROCEDURE — 1159F MED LIST DOCD IN RCRD: CPT | Mod: CPTII,95,, | Performed by: INTERNAL MEDICINE

## 2024-12-23 PROCEDURE — 99214 OFFICE O/P EST MOD 30 MIN: CPT | Mod: 95,,, | Performed by: INTERNAL MEDICINE

## 2024-12-23 NOTE — Clinical Note
1. Schedule cbc, ferritin, iron/tibc tomorrow, 3 month, 6 months at [facility]. 2. Schedule virtual visit in 6 months.  Thanks!

## 2024-12-24 ENCOUNTER — PATIENT MESSAGE (OUTPATIENT)
Dept: HEMATOLOGY/ONCOLOGY | Facility: CLINIC | Age: 44
End: 2024-12-24
Payer: COMMERCIAL

## 2024-12-24 ENCOUNTER — LAB VISIT (OUTPATIENT)
Dept: LAB | Facility: HOSPITAL | Age: 44
End: 2024-12-24
Payer: COMMERCIAL

## 2024-12-24 DIAGNOSIS — D50.0 IRON DEFICIENCY ANEMIA DUE TO CHRONIC BLOOD LOSS: ICD-10-CM

## 2024-12-24 LAB
ANISOCYTOSIS BLD QL SMEAR: SLIGHT
BASOPHILS # BLD AUTO: 0.07 K/UL (ref 0–0.2)
BASOPHILS NFR BLD: 0.7 % (ref 0–1.9)
DIFFERENTIAL METHOD BLD: ABNORMAL
EOSINOPHIL # BLD AUTO: 0.1 K/UL (ref 0–0.5)
EOSINOPHIL NFR BLD: 0.8 % (ref 0–8)
ERYTHROCYTE [DISTWIDTH] IN BLOOD BY AUTOMATED COUNT: ABNORMAL % (ref 11.5–14.5)
FERRITIN SERPL-MCNC: 47 NG/ML (ref 20–300)
HCT VFR BLD AUTO: 39.2 % (ref 37–48.5)
HGB BLD-MCNC: 12.1 G/DL (ref 12–16)
HYPOCHROMIA BLD QL SMEAR: ABNORMAL
IMM GRANULOCYTES # BLD AUTO: 0.05 K/UL (ref 0–0.04)
IMM GRANULOCYTES NFR BLD AUTO: 0.5 % (ref 0–0.5)
IRON SERPL-MCNC: 120 UG/DL (ref 30–160)
LYMPHOCYTES # BLD AUTO: 1.7 K/UL (ref 1–4.8)
LYMPHOCYTES NFR BLD: 17.8 % (ref 18–48)
MCH RBC QN AUTO: 29.4 PG (ref 27–31)
MCHC RBC AUTO-ENTMCNC: 30.9 G/DL (ref 32–36)
MCV RBC AUTO: 95 FL (ref 82–98)
MONOCYTES # BLD AUTO: 0.7 K/UL (ref 0.3–1)
MONOCYTES NFR BLD: 6.9 % (ref 4–15)
NEUTROPHILS # BLD AUTO: 7.1 K/UL (ref 1.8–7.7)
NEUTROPHILS NFR BLD: 73.3 % (ref 38–73)
NRBC BLD-RTO: 0 /100 WBC
OVALOCYTES BLD QL SMEAR: ABNORMAL
PLATELET # BLD AUTO: 243 K/UL (ref 150–450)
PMV BLD AUTO: 10.7 FL (ref 9.2–12.9)
POIKILOCYTOSIS BLD QL SMEAR: SLIGHT
RBC # BLD AUTO: 4.12 M/UL (ref 4–5.4)
SATURATED IRON: 34 % (ref 20–50)
SCHISTOCYTES BLD QL SMEAR: ABNORMAL
SCHISTOCYTES BLD QL SMEAR: PRESENT
TOTAL IRON BINDING CAPACITY: 354 UG/DL (ref 250–450)
TRANSFERRIN SERPL-MCNC: 239 MG/DL (ref 200–375)
WBC # BLD AUTO: 9.75 K/UL (ref 3.9–12.7)

## 2024-12-24 PROCEDURE — 83540 ASSAY OF IRON: CPT | Performed by: INTERNAL MEDICINE

## 2024-12-24 PROCEDURE — 85025 COMPLETE CBC W/AUTO DIFF WBC: CPT | Performed by: INTERNAL MEDICINE

## 2024-12-24 PROCEDURE — 82728 ASSAY OF FERRITIN: CPT | Performed by: INTERNAL MEDICINE

## 2024-12-24 PROCEDURE — 36415 COLL VENOUS BLD VENIPUNCTURE: CPT | Performed by: INTERNAL MEDICINE

## 2025-01-17 ENCOUNTER — LAB VISIT (OUTPATIENT)
Dept: LAB | Facility: HOSPITAL | Age: 45
End: 2025-01-17
Attending: FAMILY MEDICINE
Payer: COMMERCIAL

## 2025-01-17 DIAGNOSIS — F10.21 ACUTE ALCOHOLIC INTOXICATION IN ALCOHOLISM, IN REMISSION: Primary | ICD-10-CM

## 2025-01-17 LAB
AMPHET+METHAMPHET UR QL: NEGATIVE
BARBITURATES UR QL SCN>200 NG/ML: NEGATIVE
BENZODIAZ UR QL SCN>200 NG/ML: NEGATIVE
BZE UR QL SCN: NEGATIVE
CANNABINOIDS UR QL SCN: NEGATIVE
CREAT UR-MCNC: 50 MG/DL (ref 15–325)
ETHANOL UR-MCNC: <10 MG/DL
METHADONE UR QL SCN>300 NG/ML: NEGATIVE
OPIATES UR QL SCN: NEGATIVE
PCP UR QL SCN>25 NG/ML: NEGATIVE
TOXICOLOGY INFORMATION: NORMAL

## 2025-01-17 PROCEDURE — 80307 DRUG TEST PRSMV CHEM ANLYZR: CPT | Performed by: FAMILY MEDICINE

## 2025-01-17 PROCEDURE — 80307 DRUG TEST PRSMV CHEM ANLYZR: CPT | Mod: 91 | Performed by: FAMILY MEDICINE

## 2025-01-18 LAB — ETHYL GLUCURONIDE: NEGATIVE NG/ML

## 2025-02-25 ENCOUNTER — CLINICAL SUPPORT (OUTPATIENT)
Dept: OTHER | Facility: CLINIC | Age: 45
End: 2025-02-25

## 2025-02-25 DIAGNOSIS — Z00.8 ENCOUNTER FOR OTHER GENERAL EXAMINATION: ICD-10-CM

## 2025-02-26 VITALS
BODY MASS INDEX: 24.24 KG/M2 | WEIGHT: 142 LBS | SYSTOLIC BLOOD PRESSURE: 90 MMHG | HEIGHT: 64 IN | DIASTOLIC BLOOD PRESSURE: 64 MMHG

## 2025-02-26 LAB
GLUCOSE SERPL-MCNC: 88 MG/DL (ref 60–140)
HDLC SERPL-MCNC: 90 MG/DL
POC CHOLESTEROL, LDL (DOCK): 62 MG/DL
POC CHOLESTEROL, TOTAL: 164 MG/DL
TRIGL SERPL-MCNC: 58 MG/DL

## 2025-03-19 ENCOUNTER — OFFICE VISIT (OUTPATIENT)
Dept: FAMILY MEDICINE | Facility: CLINIC | Age: 45
End: 2025-03-19
Payer: COMMERCIAL

## 2025-03-19 VITALS
SYSTOLIC BLOOD PRESSURE: 118 MMHG | OXYGEN SATURATION: 98 % | DIASTOLIC BLOOD PRESSURE: 82 MMHG | HEIGHT: 64 IN | BODY MASS INDEX: 25.63 KG/M2 | HEART RATE: 69 BPM | WEIGHT: 150.13 LBS

## 2025-03-19 DIAGNOSIS — F33.1 MODERATE EPISODE OF RECURRENT MAJOR DEPRESSIVE DISORDER: Primary | ICD-10-CM

## 2025-03-19 DIAGNOSIS — F90.0 ADHD (ATTENTION DEFICIT HYPERACTIVITY DISORDER), INATTENTIVE TYPE: ICD-10-CM

## 2025-03-19 DIAGNOSIS — A60.04 HERPES SIMPLEX VULVOVAGINITIS: ICD-10-CM

## 2025-03-19 DIAGNOSIS — R73.03 PREDIABETES: ICD-10-CM

## 2025-03-19 DIAGNOSIS — F41.9 ANXIETY: ICD-10-CM

## 2025-03-19 DIAGNOSIS — M51.369 DEGENERATION OF INTERVERTEBRAL DISC OF LUMBAR REGION, UNSPECIFIED WHETHER PAIN PRESENT: ICD-10-CM

## 2025-03-19 DIAGNOSIS — M47.16 LUMBAR SPONDYLOSIS WITH MYELOPATHY: ICD-10-CM

## 2025-03-19 DIAGNOSIS — Z23 IMMUNIZATION DUE: ICD-10-CM

## 2025-03-19 DIAGNOSIS — D50.0 IRON DEFICIENCY ANEMIA DUE TO CHRONIC BLOOD LOSS: ICD-10-CM

## 2025-03-19 DIAGNOSIS — F51.01 PRIMARY INSOMNIA: ICD-10-CM

## 2025-03-19 DIAGNOSIS — E55.9 VITAMIN D DEFICIENCY: ICD-10-CM

## 2025-03-19 PROBLEM — F10.21: Status: ACTIVE | Noted: 2025-03-19

## 2025-03-19 PROBLEM — G43.009 MIGRAINE WITHOUT AURA: Status: RESOLVED | Noted: 2024-09-19 | Resolved: 2025-03-19

## 2025-03-19 PROCEDURE — 3008F BODY MASS INDEX DOCD: CPT | Mod: CPTII,S$GLB,, | Performed by: FAMILY MEDICINE

## 2025-03-19 PROCEDURE — 99999 PR PBB SHADOW E&M-EST. PATIENT-LVL III: CPT | Mod: PBBFAC,,, | Performed by: FAMILY MEDICINE

## 2025-03-19 PROCEDURE — 90677 PCV20 VACCINE IM: CPT | Mod: S$GLB,,, | Performed by: FAMILY MEDICINE

## 2025-03-19 PROCEDURE — 3074F SYST BP LT 130 MM HG: CPT | Mod: CPTII,S$GLB,, | Performed by: FAMILY MEDICINE

## 2025-03-19 PROCEDURE — 3079F DIAST BP 80-89 MM HG: CPT | Mod: CPTII,S$GLB,, | Performed by: FAMILY MEDICINE

## 2025-03-19 PROCEDURE — 1160F RVW MEDS BY RX/DR IN RCRD: CPT | Mod: CPTII,S$GLB,, | Performed by: FAMILY MEDICINE

## 2025-03-19 PROCEDURE — 1159F MED LIST DOCD IN RCRD: CPT | Mod: CPTII,S$GLB,, | Performed by: FAMILY MEDICINE

## 2025-03-19 PROCEDURE — 90471 IMMUNIZATION ADMIN: CPT | Mod: S$GLB,,, | Performed by: FAMILY MEDICINE

## 2025-03-19 PROCEDURE — 90480 ADMN SARSCOV2 VAC 1/ONLY CMP: CPT | Mod: S$GLB,,, | Performed by: FAMILY MEDICINE

## 2025-03-19 PROCEDURE — 91322 SARSCOV2 VAC 50 MCG/0.5ML IM: CPT | Mod: S$GLB,,, | Performed by: FAMILY MEDICINE

## 2025-03-19 PROCEDURE — 99214 OFFICE O/P EST MOD 30 MIN: CPT | Mod: 25,S$GLB,, | Performed by: FAMILY MEDICINE

## 2025-03-19 RX ORDER — BUPROPION HYDROCHLORIDE 150 MG/1
150 TABLET ORAL DAILY
Qty: 90 TABLET | Refills: 3 | Status: CANCELLED | OUTPATIENT
Start: 2025-03-19

## 2025-03-19 RX ORDER — BACLOFEN 10 MG/1
10 TABLET ORAL 3 TIMES DAILY PRN
Qty: 90 TABLET | Refills: 1 | Status: SHIPPED | OUTPATIENT
Start: 2025-03-19 | End: 2026-03-19

## 2025-03-19 RX ORDER — ATOMOXETINE 60 MG/1
CAPSULE ORAL
COMMUNITY
Start: 2025-03-19

## 2025-03-19 RX ORDER — ATOMOXETINE 18 MG/1
2 CAPSULE ORAL
COMMUNITY
Start: 2025-02-12

## 2025-03-19 RX ORDER — TRAZODONE HYDROCHLORIDE 100 MG/1
100 TABLET ORAL NIGHTLY
Qty: 90 TABLET | Refills: 3 | Status: CANCELLED | OUTPATIENT
Start: 2025-03-19

## 2025-03-19 NOTE — PROGRESS NOTES
.Subjective     Patient ID: Sarah Padron is a 45 y.o. female.    Chief Complaint: Follow-up (anemia)    HPI  History of Present Illness    CHIEF COMPLAINT:  Sarah presents today for follow up.    MENTAL HEALTH:  She has a history of depression and anxiety and is currently under psychiatric care. She was also recently diagnosed with ADHD and reports improved focus since starting Strattera last month prescribed by Dr. Wells. She has alcohol use disorder, currently in sustained remission as diagnosed by her addiction specialist.    MEDICATIONS:  She takes Strattera 60mg daily with additional 18mg as needed for ADD, Wellbutrin 150mg extended release daily for MDD and ANA LUISA, and Trazodone for sleep (occasionally at half dose).   She reports better management of herpes symptoms with Acyclovir compared to previous Valtrex treatment, with no outbreaks since switching medications. Previously, Valtrex required 2 g daily and was associated with depressive episodes when dose was reduced.   She is currently on Semaglutide injections at 10 units every 8-9 days.  Goes to a weight loss clinic in the city and pays out of pocket.    IRON DEFICIENCY ANEMIA:  She has a history of iron deficiency anemia requiring two iron infusions last year. Contributing factors include history of heavy menstrual periods and previous blood donation between 9149-7124. She currently experiences minimal menstrual bleeding due to IUD placement.    BACK PAIN:  She has a degenerating disc at L3 and experienced a recent flare-up after attempting to touch her toes, resulting in severe pain with difficulty looking down and lifting her leg. She uses heat, rest, muscle relaxer and a back brace at work for symptom management.   Previous treatment with the [employer] program was not beneficial.   During the recent flare-up, she used Baclofen and applied heat therapy over the weekend for symptom relief.  She would like a muscle relaxer such as  Baclofen prescribed.    SURGICAL HISTORY:  She underwent gastric bypass in March 2007 and avoids NSAIDs due to this history.   In September, she had bilateral breast implant removal, capsulectomy, and reimplantation due to a ruptured implant causing pain and contraction.  Will be due for her Mammogram in May but will reach out to OB/GYN to see if other screening can be done instead of mammogram due to fear of implant rupture.    ROS:  General: -fever, -chills, -fatigue, -weight gain, -weight loss  Eyes: -vision changes, -redness, -discharge  ENT: -ear pain, -nasal congestion, -sore throat  Cardiovascular: -chest pain, -palpitations, -lower extremity edema  Respiratory: -cough, -shortness of breath  Gastrointestinal: -abdominal pain, -nausea, -vomiting, -diarrhea, -constipation, -blood in stool  Genitourinary: -dysuria, -hematuria, -frequency  Musculoskeletal: -joint pain, -muscle pain, +back pain  Skin: -rash, -lesion  Neurological: -headache, -dizziness, -numbness, -tingling, +difficulty staying asleep, +difficulty falling asleep, -SI/SH/HI  Psychiatric: +anxiety, +depression, -sleep difficulty, +lack of focus/concentration, +increased distractibility          Objective     Vitals:    03/19/25 0944   BP: 118/82   Pulse: 69        Current Medications[1]     Physical Exam    General: No acute distress. Well-developed. Well-nourished.  Eyes: EOMI. Sclerae anicteric.  HENT: Normocephalic. Atraumatic. Nares patent. Moist oral mucosa.  Ears: Bilateral TMs clear. Bilateral EACs clear.  Cardiovascular: Regular rate. Regular rhythm. No murmurs. No rubs. No gallops. Normal S1, S2. Normal heart sounds.  Respiratory: Normal respiratory effort. Clear to auscultation bilaterally. No rales. No rhonchi. No wheezing. Normal lung sounds.  Abdomen: Soft. Non-tender. Non-distended. Normoactive bowel sounds.  Musculoskeletal: No  obvious deformity.  Extremities: No lower extremity edema. No swelling. Good circulation.  Neurological:  Alert & oriented x3. No slurred speech. Normal gait.  Psychiatric: Normal mood. Normal affect. Good insight. Good judgment.  Skin: Warm. Dry. No rash.        Assessment and Plan     Moderate episode of recurrent major depressive disorder  - Teaching reviewed on MDD including coping skills, safety plan and treatment options.  Continue Wellbutrin medication daily.  Continue counseling.   -     CBC Auto Differential; Future; Expected date: 09/19/2025  -     Comprehensive Metabolic Panel; Future; Expected date: 09/19/2025  -     TSH; Future; Expected date: 09/19/2025    Anxiety  - Teaching reviewed on ANA LUSIA including coping skills, safety plan and treatment options.  Continue Wellbutrin medication daily.  Continue counseling  -     CBC Auto Differential; Future; Expected date: 09/19/2025  -     Comprehensive Metabolic Panel; Future; Expected date: 09/19/2025  -     TSH; Future; Expected date: 09/19/2025    ADHD (attention deficit hyperactivity disorder), inattentive type       -  Continue with Addition Specialist and Strattera as prescribed.    Primary insomnia       - Teaching on Insomnia including good sleep hygiene and treatment options.  Continue current medication of Trazodone as needed for treatment.     Iron deficiency anemia due to chronic blood loss  -  Teaching reviewed including treatment options and warning symptoms.  Continue with Hematology and transfusions as needed.  Not currently on oral iron.  No longer having heavy menses or donating blood.  Recent labs at goal.  -     Ferritin; Future; Expected date: 09/19/2025  -     Iron and TIBC; Future; Expected date: 09/19/2025    Prediabetes  - Teaching on Prediabetes including minimizing risk factors, diabetic diet, medications, exercise >150 minutes per week, and wt management.  No medication needed at this time.  Continue to monitor.  -     CBC Auto Differential; Future; Expected date: 09/19/2025  -     Comprehensive Metabolic Panel; Future; Expected date:  09/19/2025  -     Hemoglobin A1C; Future; Expected date: 09/19/2025  -     Lipid Panel; Future; Expected date: 09/19/2025  -     TSH; Future; Expected date: 09/19/2025  -     Urinalysis, Reflex to Urine Culture Urine, Clean Catch; Future; Expected date: 09/19/2025    Vitamin D deficiency        - Teaching reviewed on deficiency including supplementation.  Needs to restart OTC Vitamin D3 1514-5101 units daily.    Degeneration of intervertebral disc of lumbar region, unspecified whether pain present  - Conservative treatment including rest, ice/heat, message, exercise, compression, topical creams, and Tylenol.  No NSAIDS due to bariatric surgery.  Start Baclofen.  Also asked about starting Tramadol or Ultram but will wait to see if Baclofen is sufficient for flare ups.    - Also plans to return to Pain Clinic for possible injections and more definitive treatment.  No referral needed for Pain Clinic as already established.  -     baclofen (LIORESAL) 10 MG tablet; Take 1 tablet (10 mg total) by mouth 3 (three) times daily as needed (back pain).  Dispense: 90 tablet; Refill: 1    Lumbar spondylosis with myelopathy  - Conservative treatment including rest, ice/heat, message, exercise, compression, topical creams, and Tylenol.  No NSAIDS due to bariatric surgery.  Start Baclofen.  Also asked about starting Tramadol or Ultram but will wait to see if Baclofen is sufficient for flare ups.    - Also plans to return to Pain Clinic for possible injections and more definitive treatment.  No referral needed for Pain Clinic as already established.  -     baclofen (LIORESAL) 10 MG tablet; Take 1 tablet (10 mg total) by mouth 3 (three) times daily as needed (back pain).  Dispense: 90 tablet; Refill: 1    Herpes simplex vulvovaginitis        - Continue Acyclovir bid and as needed for outbreaks per OB/GYN.    BMI 26.0-26.9,adult        - Teaching on Obesity including healthy diet, exercise of at least 150 minutes per week and weight  loss to healthy weight/BMI.         - Taking GLP-1 also from local weight loss clinic.    Immunization due  -     pneumoc 20-bonita conj-dip cr(PF) (PREVNAR-20 (PF)) injection Syrg 0.5 mL  -     COVID-19 (Moderna) 50 mcg/0.5 mL IM vaccine (>/= 13 yo) 0.5 mL    Assessment & Plan    F33.1 Moderate episode of recurrent major depressive disorder    PLAN SUMMARY:  - Maintain current weight management strategies  - Continue use of back brace during work and housework as needed  - Restart OTC Vitamin D supplementation  - Adjust Strattera dosage to 60 mg daily with additional 18 mg as needed  - Apply heat and perform stretching exercises for pain management  - Continue trazodone 50 mg at bedtime, with option to take half dose as needed  - Maintain Wellbutrin 150 mg XL daily as prescribed by OB    MODERATE EPISODE OF RECURRENT MAJOR DEPRESSIVE DISORDER:  - Evaluated the patient's condition and reviewed current medications.  - Continued Wellbutrin 150 mg XL daily for depression and anxiety, as prescribed by OB.  - Maintained trazodone 50 mg at bedtime for insomnia, with option to take half dose as needed.  - Noted that Dr. Wells, the patient's addiction specialist for the past 2 years, added Strattera last month to help with focus related to ADD, which has been effective.  - Adjusted Strattera dosage to 60 mg daily with additional 18 mg as needed.    BACK PAIN MANAGEMENT:  - Sarah to continue use of back brace during work and housework as needed.  - Apply heat and perform stretching exercises for pain management.    WEIGHT MANAGEMENT:  - Sarah to maintain current weight management strategies.    VITAMIN SUPPLEMENTATION:  - Restarted OTC Vitamin D supplementation.         Follow up in about 6 months (around 9/19/2025) for MDD, ANA LUISA, Insomnia, Lumbar DDD and chronic illnesses.    This note was generated with the assistance of ambient listening technology. Verbal consent was obtained by the patient and accompanying visitor(s)  for the recording of patient appointment to facilitate this note. I attest to having reviewed and edited the generated note for accuracy, though some syntax or spelling errors may persist. Please contact the author of this note for any clarification.         [1]   Current Outpatient Medications   Medication Sig Dispense Refill    atomoxetine (STRATTERA) 18 MG capsule 2 capsules.      atomoxetine (STRATTERA) 60 MG capsule 1 capsule in the morning Orally Once a day for 90 days      acyclovir (ZOVIRAX) 400 MG tablet Take 1 tablet (400 mg total) by mouth 2 (two) times daily. 60 tablet 11    acyclovir 5% (ZOVIRAX) 5 % ointment Apply topically 5 (five) times daily. 30 g 0    atomoxetine (STRATTERA) 18 MG capsule Take 2 capsules (36 mg total) by mouth once daily. 120 capsule 0    atomoxetine (STRATTERA) 60 MG capsule 1 capsule in the morning Orally Once a day 90 days 90 capsule 1    baclofen (LIORESAL) 10 MG tablet Take 1 tablet (10 mg total) by mouth 3 (three) times daily as needed (back pain). 90 tablet 1    buPROPion (WELLBUTRIN XL) 150 MG TB24 tablet Take 1 tablet (150 mg total) by mouth once daily. 90 tablet 3    semaglutide (OZEMPIC) 0.25 mg or 0.5 mg (2 mg/3 mL) pen injector Inject 0.5 mg into the skin every 7 days. Last dose was 10/16/24      traZODone (DESYREL) 100 MG tablet Take 1 tablet (100 mg total) by mouth every evening. 90 tablet 3    vitamin D (VITAMIN D3) 1000 units Tab Take 1 tablet (1,000 Units total) by mouth once daily.       No current facility-administered medications for this visit.

## 2025-04-07 ENCOUNTER — ANESTHESIA (OUTPATIENT)
Dept: SURGERY | Facility: HOSPITAL | Age: 45
End: 2025-04-07
Payer: COMMERCIAL

## 2025-04-07 ENCOUNTER — ANESTHESIA EVENT (OUTPATIENT)
Dept: SURGERY | Facility: HOSPITAL | Age: 45
End: 2025-04-07
Payer: COMMERCIAL

## 2025-04-07 ENCOUNTER — HOSPITAL ENCOUNTER (OUTPATIENT)
Facility: HOSPITAL | Age: 45
LOS: 1 days | Discharge: HOME | End: 2025-04-08
Attending: EMERGENCY MEDICINE | Admitting: SURGERY
Payer: COMMERCIAL

## 2025-04-07 DIAGNOSIS — R10.9 ABDOMINAL PAIN: ICD-10-CM

## 2025-04-07 DIAGNOSIS — K37 APPENDICITIS: Primary | ICD-10-CM

## 2025-04-07 LAB
ABSOLUTE EOSINOPHIL (OHS): 0.1 K/UL
ABSOLUTE MONOCYTE (OHS): 0.61 K/UL (ref 0.3–1)
ABSOLUTE NEUTROPHIL COUNT (OHS): 12.11 K/UL (ref 1.8–7.7)
ALBUMIN SERPL BCP-MCNC: 4.5 G/DL (ref 3.5–5.2)
ALP SERPL-CCNC: 62 UNIT/L (ref 40–150)
ALT SERPL W/O P-5'-P-CCNC: 57 UNIT/L (ref 10–44)
ANION GAP (OHS): 10 MMOL/L (ref 8–16)
AST SERPL-CCNC: 67 UNIT/L (ref 11–45)
B-HCG UR QL: NEGATIVE
BASOPHILS # BLD AUTO: 0.06 K/UL
BASOPHILS NFR BLD AUTO: 0.5 %
BILIRUB SERPL-MCNC: 1 MG/DL (ref 0.1–1)
BILIRUB UR QL STRIP.AUTO: NEGATIVE
BUN SERPL-MCNC: 21 MG/DL (ref 6–20)
CALCIUM SERPL-MCNC: 8.6 MG/DL (ref 8.7–10.5)
CHLORIDE SERPL-SCNC: 108 MMOL/L (ref 95–110)
CLARITY UR: CLEAR
CO2 SERPL-SCNC: 18 MMOL/L (ref 23–29)
COLOR UR AUTO: YELLOW
CREAT SERPL-MCNC: 0.7 MG/DL (ref 0.5–1.4)
CTP QC/QA: YES
ERYTHROCYTE [DISTWIDTH] IN BLOOD BY AUTOMATED COUNT: 12.9 % (ref 11.5–14.5)
GFR SERPLBLD CREATININE-BSD FMLA CKD-EPI: >60 ML/MIN/1.73/M2
GLUCOSE SERPL-MCNC: 123 MG/DL (ref 70–110)
GLUCOSE UR QL STRIP: NEGATIVE
HCT VFR BLD AUTO: 46 % (ref 37–48.5)
HCV AB SERPL QL IA: NORMAL
HGB BLD-MCNC: 15.1 GM/DL (ref 12–16)
HGB UR QL STRIP: NEGATIVE
HIV 1+2 AB+HIV1 P24 AG SERPL QL IA: NORMAL
IMM GRANULOCYTES # BLD AUTO: 0.05 K/UL (ref 0–0.04)
IMM GRANULOCYTES NFR BLD AUTO: 0.4 % (ref 0–0.5)
KETONES UR QL STRIP: ABNORMAL
LACTATE SERPL-SCNC: 0.9 MMOL/L (ref 0.5–2.2)
LEUKOCYTE ESTERASE UR QL STRIP: NEGATIVE
LIPASE SERPL-CCNC: 18 U/L (ref 4–60)
LYMPHOCYTES # BLD AUTO: 0.26 K/UL (ref 1–4.8)
MCH RBC QN AUTO: 32.9 PG (ref 27–31)
MCHC RBC AUTO-ENTMCNC: 32.8 G/DL (ref 32–36)
MCV RBC AUTO: 100 FL (ref 82–98)
NITRITE UR QL STRIP: NEGATIVE
NUCLEATED RBC (/100WBC) (OHS): 0 /100 WBC
OHS QRS DURATION: 58 MS
OHS QTC CALCULATION: 408 MS
PH UR STRIP: 6 [PH]
PLATELET # BLD AUTO: 274 K/UL (ref 150–450)
PMV BLD AUTO: 9.6 FL (ref 9.2–12.9)
POTASSIUM SERPL-SCNC: 4.1 MMOL/L (ref 3.5–5.1)
PROT SERPL-MCNC: 8.3 GM/DL (ref 6–8.4)
PROT UR QL STRIP: NEGATIVE
RBC # BLD AUTO: 4.59 M/UL (ref 4–5.4)
RELATIVE EOSINOPHIL (OHS): 0.8 %
RELATIVE LYMPHOCYTE (OHS): 2 % (ref 18–48)
RELATIVE MONOCYTE (OHS): 4.6 % (ref 4–15)
RELATIVE NEUTROPHIL (OHS): 91.7 % (ref 38–73)
SODIUM SERPL-SCNC: 136 MMOL/L (ref 136–145)
SP GR UR STRIP: 1.03
UROBILINOGEN UR STRIP-ACNC: NEGATIVE EU/DL
WBC # BLD AUTO: 13.19 K/UL (ref 3.9–12.7)

## 2025-04-07 PROCEDURE — 96376 TX/PRO/DX INJ SAME DRUG ADON: CPT

## 2025-04-07 PROCEDURE — 81003 URINALYSIS AUTO W/O SCOPE: CPT | Performed by: EMERGENCY MEDICINE

## 2025-04-07 PROCEDURE — 63600175 PHARM REV CODE 636 W HCPCS: Performed by: NURSE ANESTHETIST, CERTIFIED REGISTERED

## 2025-04-07 PROCEDURE — 25000003 PHARM REV CODE 250: Performed by: EMERGENCY MEDICINE

## 2025-04-07 PROCEDURE — 85025 COMPLETE CBC W/AUTO DIFF WBC: CPT | Performed by: EMERGENCY MEDICINE

## 2025-04-07 PROCEDURE — 82040 ASSAY OF SERUM ALBUMIN: CPT | Performed by: EMERGENCY MEDICINE

## 2025-04-07 PROCEDURE — 93010 ELECTROCARDIOGRAM REPORT: CPT | Mod: ,,, | Performed by: INTERNAL MEDICINE

## 2025-04-07 PROCEDURE — 63600175 PHARM REV CODE 636 W HCPCS: Performed by: STUDENT IN AN ORGANIZED HEALTH CARE EDUCATION/TRAINING PROGRAM

## 2025-04-07 PROCEDURE — 37000009 HC ANESTHESIA EA ADD 15 MINS: Performed by: SURGERY

## 2025-04-07 PROCEDURE — 71000016 HC POSTOP RECOV ADDL HR: Performed by: SURGERY

## 2025-04-07 PROCEDURE — D9220A PRA ANESTHESIA: Mod: CRNA,,, | Performed by: NURSE ANESTHETIST, CERTIFIED REGISTERED

## 2025-04-07 PROCEDURE — 25000003 PHARM REV CODE 250: Performed by: NURSE ANESTHETIST, CERTIFIED REGISTERED

## 2025-04-07 PROCEDURE — G0378 HOSPITAL OBSERVATION PER HR: HCPCS

## 2025-04-07 PROCEDURE — 25000003 PHARM REV CODE 250

## 2025-04-07 PROCEDURE — 44970 LAPAROSCOPY APPENDECTOMY: CPT | Mod: ,,, | Performed by: SURGERY

## 2025-04-07 PROCEDURE — 36000709 HC OR TIME LEV III EA ADD 15 MIN: Performed by: SURGERY

## 2025-04-07 PROCEDURE — 96365 THER/PROPH/DIAG IV INF INIT: CPT

## 2025-04-07 PROCEDURE — 25500020 PHARM REV CODE 255: Performed by: EMERGENCY MEDICINE

## 2025-04-07 PROCEDURE — 96361 HYDRATE IV INFUSION ADD-ON: CPT

## 2025-04-07 PROCEDURE — 63600175 PHARM REV CODE 636 W HCPCS

## 2025-04-07 PROCEDURE — 96366 THER/PROPH/DIAG IV INF ADDON: CPT

## 2025-04-07 PROCEDURE — D9220A PRA ANESTHESIA: Mod: ANES,,, | Performed by: ANESTHESIOLOGY

## 2025-04-07 PROCEDURE — 83605 ASSAY OF LACTIC ACID: CPT | Performed by: EMERGENCY MEDICINE

## 2025-04-07 PROCEDURE — 63600175 PHARM REV CODE 636 W HCPCS: Performed by: ANESTHESIOLOGY

## 2025-04-07 PROCEDURE — 81025 URINE PREGNANCY TEST: CPT | Performed by: EMERGENCY MEDICINE

## 2025-04-07 PROCEDURE — 96375 TX/PRO/DX INJ NEW DRUG ADDON: CPT

## 2025-04-07 PROCEDURE — 88304 TISSUE EXAM BY PATHOLOGIST: CPT | Mod: TC,59 | Performed by: SURGERY

## 2025-04-07 PROCEDURE — 27201423 OPTIME MED/SURG SUP & DEVICES STERILE SUPPLY: Performed by: SURGERY

## 2025-04-07 PROCEDURE — 83690 ASSAY OF LIPASE: CPT | Performed by: EMERGENCY MEDICINE

## 2025-04-07 PROCEDURE — 99236 HOSP IP/OBS SAME DATE HI 85: CPT | Mod: 57,,, | Performed by: SURGERY

## 2025-04-07 PROCEDURE — 87389 HIV-1 AG W/HIV-1&-2 AB AG IA: CPT | Performed by: PHYSICIAN ASSISTANT

## 2025-04-07 PROCEDURE — 36000708 HC OR TIME LEV III 1ST 15 MIN: Performed by: SURGERY

## 2025-04-07 PROCEDURE — 63600175 PHARM REV CODE 636 W HCPCS: Performed by: SURGERY

## 2025-04-07 PROCEDURE — 71000015 HC POSTOP RECOV 1ST HR: Performed by: SURGERY

## 2025-04-07 PROCEDURE — 93005 ELECTROCARDIOGRAM TRACING: CPT

## 2025-04-07 PROCEDURE — 71000033 HC RECOVERY, INTIAL HOUR: Performed by: SURGERY

## 2025-04-07 PROCEDURE — 37000008 HC ANESTHESIA 1ST 15 MINUTES: Performed by: SURGERY

## 2025-04-07 PROCEDURE — 25000003 PHARM REV CODE 250: Performed by: STUDENT IN AN ORGANIZED HEALTH CARE EDUCATION/TRAINING PROGRAM

## 2025-04-07 PROCEDURE — 88304 TISSUE EXAM BY PATHOLOGIST: CPT | Mod: 26,,, | Performed by: PATHOLOGY

## 2025-04-07 PROCEDURE — 63600175 PHARM REV CODE 636 W HCPCS: Performed by: EMERGENCY MEDICINE

## 2025-04-07 PROCEDURE — 86803 HEPATITIS C AB TEST: CPT | Performed by: PHYSICIAN ASSISTANT

## 2025-04-07 PROCEDURE — 99285 EMERGENCY DEPT VISIT HI MDM: CPT | Mod: 25

## 2025-04-07 RX ORDER — DIPHENHYDRAMINE HCL 25 MG
25 CAPSULE ORAL EVERY 6 HOURS PRN
Status: DISCONTINUED | OUTPATIENT
Start: 2025-04-07 | End: 2025-04-08 | Stop reason: HOSPADM

## 2025-04-07 RX ORDER — ONDANSETRON HYDROCHLORIDE 2 MG/ML
4 INJECTION, SOLUTION INTRAVENOUS
Status: COMPLETED | OUTPATIENT
Start: 2025-04-07 | End: 2025-04-07

## 2025-04-07 RX ORDER — MIDAZOLAM HYDROCHLORIDE 1 MG/ML
INJECTION INTRAMUSCULAR; INTRAVENOUS
Status: DISCONTINUED | OUTPATIENT
Start: 2025-04-07 | End: 2025-04-07

## 2025-04-07 RX ORDER — KETOROLAC TROMETHAMINE 30 MG/ML
INJECTION, SOLUTION INTRAMUSCULAR; INTRAVENOUS
Status: DISCONTINUED | OUTPATIENT
Start: 2025-04-07 | End: 2025-04-07

## 2025-04-07 RX ORDER — SUCCINYLCHOLINE CHLORIDE 20 MG/ML
INJECTION INTRAMUSCULAR; INTRAVENOUS
Status: DISCONTINUED | OUTPATIENT
Start: 2025-04-07 | End: 2025-04-07

## 2025-04-07 RX ORDER — MORPHINE SULFATE 4 MG/ML
4 INJECTION, SOLUTION INTRAMUSCULAR; INTRAVENOUS
Refills: 0 | Status: COMPLETED | OUTPATIENT
Start: 2025-04-07 | End: 2025-04-07

## 2025-04-07 RX ORDER — ROCURONIUM BROMIDE 10 MG/ML
INJECTION, SOLUTION INTRAVENOUS
Status: DISCONTINUED | OUTPATIENT
Start: 2025-04-07 | End: 2025-04-07

## 2025-04-07 RX ORDER — FENTANYL CITRATE 50 UG/ML
INJECTION, SOLUTION INTRAMUSCULAR; INTRAVENOUS
Status: DISCONTINUED | OUTPATIENT
Start: 2025-04-07 | End: 2025-04-07

## 2025-04-07 RX ORDER — FENTANYL CITRATE 50 UG/ML
25 INJECTION, SOLUTION INTRAMUSCULAR; INTRAVENOUS EVERY 5 MIN PRN
Status: DISCONTINUED | OUTPATIENT
Start: 2025-04-07 | End: 2025-04-07

## 2025-04-07 RX ORDER — OXYCODONE HYDROCHLORIDE 10 MG/1
10 TABLET ORAL EVERY 4 HOURS PRN
Refills: 0 | Status: DISCONTINUED | OUTPATIENT
Start: 2025-04-07 | End: 2025-04-08 | Stop reason: HOSPADM

## 2025-04-07 RX ORDER — LIDOCAINE HYDROCHLORIDE 20 MG/ML
INJECTION INTRAVENOUS
Status: DISCONTINUED | OUTPATIENT
Start: 2025-04-07 | End: 2025-04-07

## 2025-04-07 RX ORDER — SODIUM CHLORIDE 0.9 % (FLUSH) 0.9 %
10 SYRINGE (ML) INJECTION
Status: DISCONTINUED | OUTPATIENT
Start: 2025-04-07 | End: 2025-04-08 | Stop reason: HOSPADM

## 2025-04-07 RX ORDER — DIPHENHYDRAMINE HYDROCHLORIDE 50 MG/ML
25 INJECTION, SOLUTION INTRAMUSCULAR; INTRAVENOUS
Status: COMPLETED | OUTPATIENT
Start: 2025-04-07 | End: 2025-04-07

## 2025-04-07 RX ORDER — HYDROMORPHONE HYDROCHLORIDE 2 MG/ML
0.5 INJECTION, SOLUTION INTRAMUSCULAR; INTRAVENOUS; SUBCUTANEOUS
Refills: 0 | Status: COMPLETED | OUTPATIENT
Start: 2025-04-07 | End: 2025-04-07

## 2025-04-07 RX ORDER — ONDANSETRON 8 MG/1
8 TABLET, ORALLY DISINTEGRATING ORAL EVERY 8 HOURS PRN
Status: DISCONTINUED | OUTPATIENT
Start: 2025-04-07 | End: 2025-04-08 | Stop reason: HOSPADM

## 2025-04-07 RX ORDER — PROPOFOL 10 MG/ML
VIAL (ML) INTRAVENOUS
Status: DISCONTINUED | OUTPATIENT
Start: 2025-04-07 | End: 2025-04-07

## 2025-04-07 RX ORDER — SODIUM CHLORIDE, SODIUM LACTATE, POTASSIUM CHLORIDE, CALCIUM CHLORIDE 600; 310; 30; 20 MG/100ML; MG/100ML; MG/100ML; MG/100ML
INJECTION, SOLUTION INTRAVENOUS CONTINUOUS
Status: DISCONTINUED | OUTPATIENT
Start: 2025-04-07 | End: 2025-04-08

## 2025-04-07 RX ORDER — ONDANSETRON HYDROCHLORIDE 2 MG/ML
INJECTION, SOLUTION INTRAVENOUS
Status: DISCONTINUED | OUTPATIENT
Start: 2025-04-07 | End: 2025-04-07

## 2025-04-07 RX ORDER — TALC
6 POWDER (GRAM) TOPICAL NIGHTLY PRN
Status: DISCONTINUED | OUTPATIENT
Start: 2025-04-07 | End: 2025-04-08 | Stop reason: HOSPADM

## 2025-04-07 RX ORDER — DEXAMETHASONE SODIUM PHOSPHATE 4 MG/ML
INJECTION, SOLUTION INTRA-ARTICULAR; INTRALESIONAL; INTRAMUSCULAR; INTRAVENOUS; SOFT TISSUE
Status: DISCONTINUED | OUTPATIENT
Start: 2025-04-07 | End: 2025-04-07

## 2025-04-07 RX ORDER — ACETAMINOPHEN 325 MG/1
650 TABLET ORAL EVERY 6 HOURS
Status: DISCONTINUED | OUTPATIENT
Start: 2025-04-07 | End: 2025-04-08

## 2025-04-07 RX ORDER — SODIUM CHLORIDE 0.9 % (FLUSH) 0.9 %
3 SYRINGE (ML) INJECTION
Status: DISCONTINUED | OUTPATIENT
Start: 2025-04-07 | End: 2025-04-08 | Stop reason: HOSPADM

## 2025-04-07 RX ORDER — DEXMEDETOMIDINE HYDROCHLORIDE 100 UG/ML
INJECTION, SOLUTION INTRAVENOUS
Status: DISCONTINUED | OUTPATIENT
Start: 2025-04-07 | End: 2025-04-07

## 2025-04-07 RX ORDER — HALOPERIDOL LACTATE 5 MG/ML
0.5 INJECTION, SOLUTION INTRAMUSCULAR EVERY 10 MIN PRN
Status: DISCONTINUED | OUTPATIENT
Start: 2025-04-07 | End: 2025-04-07

## 2025-04-07 RX ORDER — PHENYLEPHRINE HYDROCHLORIDE 10 MG/ML
INJECTION INTRAVENOUS
Status: DISCONTINUED | OUTPATIENT
Start: 2025-04-07 | End: 2025-04-07

## 2025-04-07 RX ORDER — HYDROMORPHONE HYDROCHLORIDE 2 MG/ML
0.5 INJECTION, SOLUTION INTRAMUSCULAR; INTRAVENOUS; SUBCUTANEOUS
Status: COMPLETED | OUTPATIENT
Start: 2025-04-07 | End: 2025-04-07

## 2025-04-07 RX ORDER — GLUCAGON 1 MG
1 KIT INJECTION
Status: DISCONTINUED | OUTPATIENT
Start: 2025-04-07 | End: 2025-04-08 | Stop reason: HOSPADM

## 2025-04-07 RX ORDER — KETAMINE HCL IN 0.9 % NACL 50 MG/5 ML
SYRINGE (ML) INTRAVENOUS
Status: DISCONTINUED | OUTPATIENT
Start: 2025-04-07 | End: 2025-04-07

## 2025-04-07 RX ORDER — BUPIVACAINE HYDROCHLORIDE 5 MG/ML
INJECTION, SOLUTION EPIDURAL; INTRACAUDAL; PERINEURAL
Status: DISCONTINUED | OUTPATIENT
Start: 2025-04-07 | End: 2025-04-07 | Stop reason: HOSPADM

## 2025-04-07 RX ORDER — OXYCODONE HYDROCHLORIDE 5 MG/1
5 TABLET ORAL EVERY 4 HOURS PRN
Refills: 0 | Status: DISCONTINUED | OUTPATIENT
Start: 2025-04-07 | End: 2025-04-08 | Stop reason: HOSPADM

## 2025-04-07 RX ORDER — LIDOCAINE HYDROCHLORIDE 10 MG/ML
1 INJECTION, SOLUTION EPIDURAL; INFILTRATION; INTRACAUDAL; PERINEURAL ONCE AS NEEDED
Status: DISCONTINUED | OUTPATIENT
Start: 2025-04-07 | End: 2025-04-08 | Stop reason: HOSPADM

## 2025-04-07 RX ADMIN — SUCCINYLCHOLINE 120 MG: 20 INJECTION, SOLUTION INTRAMUSCULAR; INTRAVENOUS at 04:04

## 2025-04-07 RX ADMIN — OXYCODONE HYDROCHLORIDE 10 MG: 10 TABLET ORAL at 08:04

## 2025-04-07 RX ADMIN — DEXAMETHASONE SODIUM PHOSPHATE 4 MG: 4 INJECTION INTRA-ARTICULAR; INTRALESIONAL; INTRAMUSCULAR; INTRAVENOUS; SOFT TISSUE at 04:04

## 2025-04-07 RX ADMIN — SODIUM CHLORIDE: 9 INJECTION, SOLUTION INTRAVENOUS at 03:04

## 2025-04-07 RX ADMIN — FENTANYL CITRATE 25 MCG: 50 INJECTION, SOLUTION INTRAMUSCULAR; INTRAVENOUS at 06:04

## 2025-04-07 RX ADMIN — PIPERACILLIN SODIUM AND TAZOBACTAM SODIUM 4.5 G: 4; .5 INJECTION, POWDER, FOR SOLUTION INTRAVENOUS at 12:04

## 2025-04-07 RX ADMIN — PHENYLEPHRINE HYDROCHLORIDE 200 MCG: 10 INJECTION INTRAVENOUS at 04:04

## 2025-04-07 RX ADMIN — MORPHINE SULFATE 4 MG: 4 INJECTION INTRAVENOUS at 03:04

## 2025-04-07 RX ADMIN — MIDAZOLAM HYDROCHLORIDE 2 MG: 2 INJECTION, SOLUTION INTRAMUSCULAR; INTRAVENOUS at 03:04

## 2025-04-07 RX ADMIN — SODIUM CHLORIDE, POTASSIUM CHLORIDE, SODIUM LACTATE AND CALCIUM CHLORIDE: 600; 310; 30; 20 INJECTION, SOLUTION INTRAVENOUS at 09:04

## 2025-04-07 RX ADMIN — DIPHENHYDRAMINE HYDROCHLORIDE 25 MG: 50 INJECTION, SOLUTION INTRAMUSCULAR; INTRAVENOUS at 09:04

## 2025-04-07 RX ADMIN — PIPERACILLIN SODIUM AND TAZOBACTAM SODIUM 4.5 G: 4; .5 INJECTION, POWDER, FOR SOLUTION INTRAVENOUS at 04:04

## 2025-04-07 RX ADMIN — SODIUM CHLORIDE 1000 ML: 9 INJECTION, SOLUTION INTRAVENOUS at 03:04

## 2025-04-07 RX ADMIN — LIDOCAINE HYDROCHLORIDE 100 MG: 20 INJECTION INTRAVENOUS at 04:04

## 2025-04-07 RX ADMIN — SUGAMMADEX 200 MG: 100 INJECTION, SOLUTION INTRAVENOUS at 05:04

## 2025-04-07 RX ADMIN — ROCURONIUM BROMIDE 35 MG: 10 INJECTION, SOLUTION INTRAVENOUS at 04:04

## 2025-04-07 RX ADMIN — DIPHENHYDRAMINE HYDROCHLORIDE 25 MG: 50 INJECTION, SOLUTION INTRAMUSCULAR; INTRAVENOUS at 03:04

## 2025-04-07 RX ADMIN — ROCURONIUM BROMIDE 5 MG: 10 INJECTION, SOLUTION INTRAVENOUS at 04:04

## 2025-04-07 RX ADMIN — ACETAMINOPHEN 650 MG: 325 TABLET ORAL at 11:04

## 2025-04-07 RX ADMIN — DEXMEDETOMIDINE 12 MCG: 100 INJECTION, SOLUTION, CONCENTRATE INTRAVENOUS at 04:04

## 2025-04-07 RX ADMIN — ONDANSETRON 4 MG: 2 INJECTION INTRAMUSCULAR; INTRAVENOUS at 05:04

## 2025-04-07 RX ADMIN — DEXMEDETOMIDINE 12 MCG: 100 INJECTION, SOLUTION, CONCENTRATE INTRAVENOUS at 05:04

## 2025-04-07 RX ADMIN — PROPOFOL 30 MG: 10 INJECTION, EMULSION INTRAVENOUS at 05:04

## 2025-04-07 RX ADMIN — ONDANSETRON 4 MG: 2 INJECTION INTRAMUSCULAR; INTRAVENOUS at 01:04

## 2025-04-07 RX ADMIN — PROPOFOL 150 MG: 10 INJECTION, EMULSION INTRAVENOUS at 04:04

## 2025-04-07 RX ADMIN — PIPERACILLIN SODIUM AND TAZOBACTAM SODIUM 4.5 G: 4; .5 INJECTION, POWDER, FOR SOLUTION INTRAVENOUS at 08:04

## 2025-04-07 RX ADMIN — HYDROMORPHONE HYDROCHLORIDE 0.5 MG: 2 INJECTION, SOLUTION INTRAMUSCULAR; INTRAVENOUS; SUBCUTANEOUS at 03:04

## 2025-04-07 RX ADMIN — FENTANYL CITRATE 25 MCG: 50 INJECTION, SOLUTION INTRAMUSCULAR; INTRAVENOUS at 07:04

## 2025-04-07 RX ADMIN — KETOROLAC TROMETHAMINE 15 MG: 30 INJECTION, SOLUTION INTRAMUSCULAR; INTRAVENOUS at 05:04

## 2025-04-07 RX ADMIN — HYDROMORPHONE HYDROCHLORIDE 0.5 MG: 2 INJECTION, SOLUTION INTRAMUSCULAR; INTRAVENOUS; SUBCUTANEOUS at 09:04

## 2025-04-07 RX ADMIN — FENTANYL CITRATE 50 MCG: 50 INJECTION, SOLUTION INTRAMUSCULAR; INTRAVENOUS at 04:04

## 2025-04-07 RX ADMIN — Medication 10 MG: at 05:04

## 2025-04-07 RX ADMIN — MORPHINE SULFATE 4 MG: 4 INJECTION INTRAVENOUS at 01:04

## 2025-04-07 RX ADMIN — MORPHINE SULFATE 4 MG: 4 INJECTION INTRAVENOUS at 02:04

## 2025-04-07 RX ADMIN — IOHEXOL 75 ML: 350 INJECTION, SOLUTION INTRAVENOUS at 02:04

## 2025-04-07 RX ADMIN — PHENYLEPHRINE HYDROCHLORIDE 200 MCG: 10 INJECTION INTRAVENOUS at 05:04

## 2025-04-07 RX ADMIN — Medication 20 MG: at 04:04

## 2025-04-07 RX ADMIN — ROCURONIUM BROMIDE 10 MG: 10 INJECTION, SOLUTION INTRAVENOUS at 04:04

## 2025-04-07 RX ADMIN — ACETAMINOPHEN 650 MG: 325 TABLET ORAL at 06:04

## 2025-04-07 RX ADMIN — FENTANYL CITRATE 50 MCG: 50 INJECTION, SOLUTION INTRAMUSCULAR; INTRAVENOUS at 03:04

## 2025-04-07 RX ADMIN — SODIUM CHLORIDE, POTASSIUM CHLORIDE, SODIUM LACTATE AND CALCIUM CHLORIDE: 600; 310; 30; 20 INJECTION, SOLUTION INTRAVENOUS at 07:04

## 2025-04-07 RX ADMIN — SODIUM CHLORIDE 1000 ML: 0.9 INJECTION, SOLUTION INTRAVENOUS at 02:04

## 2025-04-07 NOTE — Clinical Note
Diagnosis: Abdominal pain [108917]   Reason for IP Medical Treatment  (Clinical interventions that can only be accomplished in the IP setting? ) :: Appendicitis   Plans for Post-Acute care--if anticipated (pick the single best option):: A. No post acute care anticipated at this time

## 2025-04-07 NOTE — PLAN OF CARE
[facility] - Surgery  Initial Discharge Assessment       Primary Care Provider: Dunia Hunter MD    Admission Diagnosis: Appendicitis [K37]  Abdominal pain [R10.9]    Admission Date: 4/7/2025  Expected Discharge Date:     Pt stated she is independent with her ambulation and ADL's and does not require assistance or equipment.      Pt to d/c home with no needs when ready    Transition of Care Barriers: (P) None    Payor: BLUE [employer]OSS [employer]EFIT / Plan: [facility] [place] [place] / Product Type: Self Funded /     Extended Emergency Contact Information  Primary Emergency Contact: Vikki Avalos  Address: [address] [place] [address] [facility]  Home Phone: [phone]  Relation: Mother  Secondary Emergency Contact: shine Serrano  Mobile Phone: [phone]  Relation: Significant other    Discharge Plan A: (P) Home  Discharge Plan B: (P) Home      [facility]  [address] [facility] [address]  Phone: [phone] Fax: [phone]      Initial Assessment (most recent)       Adult Discharge Assessment - 04/07/25 1047          Discharge Assessment    Assessment Type Discharge Planning Assessment (P)      Confirmed/corrected address, phone number and insurance Yes (P)      Confirmed Demographics Correct on Facesheet (P)      Source of Information patient (P)      People in Home child(aidan), dependent (P)      Facility Arrived From: home (P)      Do you expect to return to your current living situation? Yes (P)      Do you have help at home or someone to help you manage your care at home? No (P)      Prior to hospitilization cognitive status: Alert/Oriented;No Deficits (P)      Current cognitive status: No Deficits;Alert/Oriented (P)      Walking or Climbing Stairs Difficulty no (P)      Dressing/Bathing Difficulty no (P)      Home Accessibility stairs within home (P)      Number of Stairs, Within Home, Primary ten (P)      Home Layout Able to live on 1st floor (P)       Equipment Currently Used at Home none (P)      Patient currently being followed by outpatient case management? No (P)      Do you currently have service(s) that help you manage your care at home? No (P)      Do you have any problems affording any of your prescribed medications? No (P)      Is the patient taking medications as prescribed? yes (P)      Who is going to help you get home at discharge? family/friends (P)      How do you get to doctors appointments? car, drives self (P)      Are you on dialysis? No (P)      Do you take coumadin? No (P)      Discharge Plan A Home (P)      Discharge Plan B Home (P)      DME Needed Upon Discharge  none (P)      Discharge Plan discussed with: Patient (P)      Transition of Care Barriers None (P)         Physical Activity    On average, how many days per week do you engage in moderate to strenuous exercise (like a brisk walk)? 0 days (P)      On average, how many minutes do you engage in exercise at this level? 0 min (P)         Financial Resource Strain    How hard is it for you to pay for the very basics like food, housing, medical care, and heating? Not very hard (P)         Housing Stability    In the last 12 months, was there a time when you were not able to pay the mortgage or rent on time? No (P)      At any time in the past 12 months, were you homeless or living in a shelter (including now)? No (P)         Transportation Needs    In the past 12 months, has lack of transportation kept you from medical appointments or from getting medications? No (P)      In the past 12 months, has lack of transportation kept you from meetings, work, or from getting things needed for daily living? No (P)         Food Insecurity    Within the past 12 months, you worried that your food would run out before you got the money to buy more. Never true (P)      Within the past 12 months, the food you bought just didn't last and you didn't have money to get more. Never true (P)          Stress    Do you feel stress - tense, restless, nervous, or anxious, or unable to sleep at night because your mind is troubled all the time - these days? Not at all (P)         Social Isolation    How often do you feel lonely or isolated from those around you?  Never (P)         Alcohol Use    Q1: How often do you have a drink containing alcohol? Never (P)      Q2: How many drinks containing alcohol do you have on a typical day when you are drinking? Patient does not drink (P)      Q3: How often do you have six or more drinks on one occasion? Never (P)         Utilities    In the past 12 months has the electric, gas, oil, or water company threatened to shut off services in your home? No (P)         Health Literacy    How often do you need to have someone help you when you read instructions, pamphlets, or other written material from your doctor or pharmacy? Never (P)         OTHER    Name(s) of People in Home dependent children (P)                    Xaun Robison CD, MSW, LMSW, RSW   Case Management  [facility]  Email: [email or web address]

## 2025-04-07 NOTE — ANESTHESIA PROCEDURE NOTES
Intubation    Date/Time: 4/7/2025 4:05 PM    Performed by: Laly Dueñas CRNA  Authorized by: Carlos Alberto Cox MD    Intubation:     Induction:  Rapid sequence induction    Intubated:  Postinduction    Mask Ventilation:  Not attempted    Attempts:  1    Attempted By:  CRNA    Method of Intubation:  Video laryngoscopy    Blade:  Bloom 3    Laryngeal View Grade: Grade I - full view of cords      Difficult Airway Encountered?: No      Complications:  None    Airway Device:  Oral endotracheal tube    Airway Device Size:  7.0    Style/Cuff Inflation:  Cuffed (inflated to minimal occlusive pressure)    Tube secured:  22    Secured at:  The teeth    Placement Verified By:  Capnometry    Complicating Factors:  None    Findings Post-Intubation:  BS equal bilateral and atraumatic/condition of teeth unchanged

## 2025-04-07 NOTE — PROVIDER PROGRESS NOTES - EMERGENCY DEPT.
Encounter Date: 4/7/2025    ED Physician Progress Notes            0250    CT reviewed.  Preliminary reading shows acute uncomplicated appendicitis.  Consulted General surgery.    Patient is a 45-year-old female with past medical history complicated abdominal history status post bypass, internal hernias that is presenting for abdominal pain.  The patient was handed off to me by previous physician pending CT results.    Physical exam: Well-appearing 45-year-old female, no distress however severe tenderness to palpation over epigastric, midline abdomen.  Otherwise no distress.  Benign cardiac, respiratory exam.    Plan: CT shows uncomplicated appendicitis.  Consulted General surgery.  They agree with treatment plan.  Patient understands and agrees.

## 2025-04-07 NOTE — ED TRIAGE NOTES
Sarah Padron, a 45 y.o. female presents to the ED w/ complaint of epigastric pain, nausea, vomiting, diarrhea since earlier today. Pt has history of gastric bypass    Triage note:  Chief Complaint   Patient presents with    Abdominal Pain     N/v/d and headache since 10PM. Epigastric pain. Hx gastric bypass.      Review of patient's allergies indicates:   Allergen Reactions    Dilaudid [hydromorphone] Itching    Meperidine Itching and Nausea And Vomiting     Other reaction(s): Other (See Comments)     Past Medical History:   Diagnosis Date    Migraine without aura 09/19/2024

## 2025-04-07 NOTE — ED NOTES
Requested tele box from [facility], they do not have any tele boxes or bedside monitoring per . Notified charge nurse.

## 2025-04-07 NOTE — ANESTHESIA PREPROCEDURE EVALUATION
04/07/2025  [facility]  Anesthesia Pre-Operative Evaluation         Patient Name: Sarah Padron  YOB: 1980  MRN: [id number]    SUBJECTIVE:     Pre-operative evaluation for Procedure(s) (LRB):  APPENDECTOMY, LAPAROSCOPIC ADD ON #5 (N/A)     04/07/2025    Sarah Padron is a 45 y.o. female     Full medical history listed below      LDA: None documented.    Prev airway: None documented.     GTTs: None documented.        Problem List[1]    Review of patient's allergies indicates:   Allergen Reactions    Dilaudid [hydromorphone] Itching    Meperidine Itching and Nausea And Vomiting     Other reaction(s): Other (See Comments)       Current Inpatient Medications:   acetaminophen  650 mg Oral Q6H    piperacillin-tazobactam (Zosyn) IV (PEDS and ADULTS) (extended infusion is not appropriate)  4.5 g Intravenous Q8H       Medications Ordered Prior to Encounter[2]    Past Surgical History:   Procedure Laterality Date    ABDOMINAL SURGERY      AUGMENTATION OF BREAST Bilateral 2009    silicone implants and a lift    BREAST SURGERY      lift    CHOLECYSTECTOMY      DIAGNOSTIC LAPAROSCOPY N/A 05/18/2023    Procedure: LAPAROSCOPY, DIAGNOSTIC;  Surgeon: Davy Barnes MD;  Location: [address];  Service: General;  Laterality: N/A;  REPAIR INTERNAL HERNIA    DIAGNOSTIC LAPAROSCOPY N/A 07/05/2023    Procedure: LAPAROSCOPY, DIAGNOSTIC with revision of JJ anastomosis;  Surgeon: Davy Barnes MD;  Location: [address];  Service: General;  Laterality: N/A;    DIAGNOSTIC LAPAROSCOPY N/A 09/24/2023    Procedure: LAPAROSCOPY, DIAGNOSTIC;  Surgeon: Adal Pineda Jr., MD;  Location: [address];  Service: General;  Laterality: N/A;    EYE SURGERY Right     cataract    GASTRIC BYPASS      HERNIA REPAIR N/A 01/01/2016    internal    HYSTEROSCOPY  "WITH DILATION AND CURETTAGE OF UTERUS N/A 10/30/2024    Procedure: HYSTEROSCOPY, WITH DILATION AND CURETTAGE OF UTERUS;  Surgeon: Juan Wood MD;  Location: [facility];  Service: OB/GYN;  Laterality: N/A;    INTRAUTERINE DEVICE INSERTION N/A 10/30/2024    Procedure: INSERTION, INTRAUTERINE DEVICE;  Surgeon: Juan Wood MD;  Location: [facility];  Service: OB/GYN;  Laterality: N/A;    lap band removal  07/09/2014    LAPAROSCOPIC GASTRIC BANDING      LAPAROSCOPIC GASTRIC BANDING  01/01/2008    revision    LAPAROSCOPIC REPAIR OF INCISIONAL HERNIA  09/24/2023    Procedure: REPAIR, HERNIA, INCISIONAL, LAPAROSCOPIC;  Surgeon: Adal Pineda Jr., MD;  Location: [facility] [address] [place] [address];  Service: General;;    POLYPECTOMY  10/30/2024    Procedure: POLYPECTOMY;  Surgeon: Juan Wood MD;  Location: [facility];  Service: OB/GYN;;       Social History[3]    OBJECTIVE:     Vital Signs Range (Last 24H):  Temp:  [36.8 °C (98.2 °F)-37.3 °C (99.2 °F)]   Pulse:  []   Resp:  [13-22]   BP: ()/(54-85)   SpO2:  [94 %-98 %]       CBC:   Recent Labs     04/07/25  0115   WBC 13.19*   RBC 4.59   HGB 15.1   HCT 46.0      *   MCH 32.9*   MCHC 32.8       CMP:   Recent Labs     04/07/25  0115      K 4.1      CO2 18*   BUN 21*   CREATININE 0.7   CALCIUM 8.6*   ALBUMIN 4.5   ALKPHOS 62   ALT 57*   AST 67*   BILITOT 1.0       INR:  No results for input(s): "PT", "INR", "PROTIME", "APTT" in the last 72 hours.    Diagnostic Studies: No relevant studies.    EKG:   Results for orders placed or performed during the hospital encounter of 04/07/25   EKG 12-lead    Collection Time: 04/07/25  1:05 AM   Result Value Ref Range    QRS Duration 58 ms    [facility] QTC Calculation 408 ms    Narrative    Test Reason : R10.9,    Vent. Rate : 107 BPM     Atrial Rate : 107 BPM     P-R Int : 120 ms          QRS Dur :  58 ms      QT Int : 306 ms       P-R-T Axes :  41  86  58 degrees    QTcB Int : 408 ms    Sinus " tachycardia  Low voltage QRS  Low septal forces  Abnormal ECG  When compared with ECG of 28-Aug-2024 15:46,  No significant change was found  Confirmed by Aiden Talavera ([id number]) on 4/7/2025 10:09:00 AM    Referred By: CAITEREFERRAL SELF           Confirmed By: Aiden Talavera        2D ECHO:   No results found for this or any previous visit.         ASSESSMENT/PLAN:           Pre-op Assessment    I have reviewed the Patient Summary Reports.     I have reviewed the Nursing Notes. I have reviewed the NPO Status.   I have reviewed the Medications.     Review of Systems  Anesthesia Hx:   History of prior surgery of interest to airway management or planning:          Denies Family Hx of Anesthesia complications.    Denies Personal Hx of Anesthesia complications.                        Physical Exam  General: Well nourished, Cooperative, Alert and Oriented    Airway:  Mallampati: II   Mouth Opening: Normal  TM Distance: Normal  Tongue: Normal  Neck ROM: Normal ROM    Dental:  Intact    Chest/Lungs:  Clear to auscultation, Normal Respiratory Rate    Heart:  Rate: Normal  Rhythm: Regular Rhythm        Anesthesia Plan  Type of Anesthesia, risks & benefits discussed:    Anesthesia Type: Gen ETT  Intra-op Monitoring Plan: Standard ASA Monitors  Post Op Pain Control Plan: multimodal analgesia and IV/PO Opioids PRN  Induction:  IV  Airway Plan: Video  Informed Consent: Informed consent signed with the Patient and all parties understand the risks and agree with anesthesia plan.  All questions answered.   ASA Score: 2  Day of Surgery Review of History & Physical: H&P Update referred to the surgeon/provider.    Ready For Surgery From Anesthesia Perspective.     .           [1]   Patient Active Problem List  Diagnosis    Vitamin D deficiency    History of Yogesh-en-Y gastric bypass    Facet arthropathy, cervical    DDD (degenerative disc disease), lumbar    Lumbar spondylosis with myelopathy    Internal hernia    BMI 26.0-26.9,adult    Congenital  toxoplasmosis    PCOS (polycystic ovarian syndrome)    Herpes simplex vulvovaginitis    Anxiety    Moderate episode of recurrent major depressive disorder    Primary insomnia    Prediabetes    Iron deficiency anemia due to chronic blood loss    Status post hysteroscopy, polpectomy, and IUD insertion    ADHD (attention deficit hyperactivity disorder), inattentive type    Severe alcohol use disorder, in sustained remission   [2]   No current facility-administered medications on file prior to encounter.     Current Outpatient Medications on File Prior to Encounter   Medication Sig Dispense Refill    acyclovir (ZOVIRAX) 400 MG tablet Take 1 tablet (400 mg total) by mouth 2 (two) times daily. 60 tablet 11    acyclovir 5% (ZOVIRAX) 5 % ointment Apply topically 5 (five) times daily. 30 g 0    atomoxetine (STRATTERA) 18 MG capsule Take 2 capsules (36 mg total) by mouth once daily. 120 capsule 0    atomoxetine (STRATTERA) 18 MG capsule 2 capsules.      atomoxetine (STRATTERA) 60 MG capsule 1 capsule in the morning Orally Once a day for 90 days      atomoxetine (STRATTERA) 60 MG capsule 1 capsule in the morning Orally Once a day 90 days 90 capsule 1    baclofen (LIORESAL) 10 MG tablet Take 1 tablet (10 mg total) by mouth 3 (three) times daily as needed (back pain). 90 tablet 1    buPROPion (WELLBUTRIN XL) 150 MG TB24 tablet Take 1 tablet (150 mg total) by mouth once daily. 90 tablet 3    semaglutide (OZEMPIC) 0.25 mg or 0.5 mg (2 mg/3 mL) pen injector Inject 0.5 mg into the skin every 7 days. Last dose was 10/16/24      traZODone (DESYREL) 100 MG tablet Take 1 tablet (100 mg total) by mouth every evening. 90 tablet 3    vitamin D (VITAMIN D3) 1000 units Tab Take 1 tablet (1,000 Units total) by mouth once daily.     [3]   Social History  Socioeconomic History    Marital status:    Tobacco Use    Smoking status: Never    Smokeless tobacco: Never   Substance and Sexual Activity    Alcohol use: Yes     Comment: ocassional;  wine    Drug use: No    Sexual activity: Yes     Partners: Male     Social Drivers of Health     Financial Resource Strain: Low Risk  (4/7/2025)    Overall Financial Resource Strain (CARDIA)     Difficulty of Paying Living Expenses: Not very hard   Food Insecurity: No Food Insecurity (4/7/2025)    Hunger Vital Sign     Worried About Running Out of Food in the Last Year: Never true     Ran Out of Food in the Last Year: Never true   Transportation Needs: No Transportation Needs (4/7/2025)    PRAPARE - Transportation     Lack of Transportation (Medical): No     Lack of Transportation (Non-Medical): No   Physical Activity: Inactive (4/7/2025)    Exercise Vital Sign     Days of Exercise per Week: 0 days     Minutes of Exercise per Session: 0 min   Stress: No Stress Concern Present (4/7/2025)    [employer] - Occupational Stress Questionnaire     Feeling of Stress : Not at all   Recent Concern: Stress - Stress Concern Present (3/18/2025)    [employer] - Occupational Stress Questionnaire     Feeling of Stress : To some extent   Housing Stability: Low Risk  (4/7/2025)    Housing Stability Vital Sign     Unable to Pay for Housing in the Last Year: No     Number of Times Moved in the Last Year: 0     Homeless in the Last Year: No

## 2025-04-07 NOTE — ED PROVIDER NOTES
Encounter Date: 4/7/2025       History     Chief Complaint   Patient presents with    Abdominal Pain     N/v/d and headache since 10PM. Epigastric pain. Hx gastric bypass.      Pt is a 46 yo F with PMH of cholecystectomy,obesity s/p lap band in 2008, lap band removal with conversion to bypass in 2014, reduction of internal hernia in 2016, diagnostic lap with reduction of internal hernia 5/2023, and diagnostic lap with JJ anastomosis revision for intussusception in 7/2023,  Ex lap 09/24- had an internal hernia through Galeana's space who presents with severe upper abdominal pain that started 2 days ago and improved then returned today after eating pizza. She notes pain is severe and associated with nausea and vomiting. She feels like something is wrong with her intestines.    Patient reports she has been on a GLP 1 but she has not taken it in a week and a half    The history is provided by the patient and medical records.     Review of patient's allergies indicates:   Allergen Reactions    Dilaudid [hydromorphone] Itching    Meperidine Itching and Nausea And Vomiting     Other reaction(s): Other (See Comments)     Past Medical History:   Diagnosis Date    Migraine without aura 09/19/2024     Past Surgical History:   Procedure Laterality Date    ABDOMINAL SURGERY      AUGMENTATION OF BREAST Bilateral 2009    silicone implants and a lift    BREAST SURGERY      lift    CHOLECYSTECTOMY      DIAGNOSTIC LAPAROSCOPY N/A 05/18/2023    Procedure: LAPAROSCOPY, DIAGNOSTIC;  Surgeon: Davy Barnes MD;  Location: [facility] OR [address] [place] [address];  Service: General;  Laterality: N/A;  REPAIR INTERNAL HERNIA    DIAGNOSTIC LAPAROSCOPY N/A 07/05/2023    Procedure: LAPAROSCOPY, DIAGNOSTIC with revision of JJ anastomosis;  Surgeon: Davy Barnes MD;  Location: [address];  Service: General;  Laterality: N/A;    DIAGNOSTIC LAPAROSCOPY N/A 09/24/2023    Procedure: LAPAROSCOPY, DIAGNOSTIC;  Surgeon: Adal Pineda Jr., MD;   Location: [facility] OR 2ND FLR;  Service: General;  Laterality: N/A;    EYE SURGERY Right     cataract    GASTRIC BYPASS      HERNIA REPAIR N/A 01/01/2016    internal    HYSTEROSCOPY WITH DILATION AND CURETTAGE OF UTERUS N/A 10/30/2024    Procedure: HYSTEROSCOPY, WITH DILATION AND CURETTAGE OF UTERUS;  Surgeon: Juan Wood MD;  Location: [facility] OR;  Service: OB/GYN;  Laterality: N/A;    INTRAUTERINE DEVICE INSERTION N/A 10/30/2024    Procedure: INSERTION, INTRAUTERINE DEVICE;  Surgeon: Juan Wood MD;  Location: [facility] OR;  Service: OB/GYN;  Laterality: N/A;    lap band removal  07/09/2014    LAPAROSCOPIC GASTRIC BANDING      LAPAROSCOPIC GASTRIC BANDING  01/01/2008    revision    LAPAROSCOPIC REPAIR OF INCISIONAL HERNIA  09/24/2023    Procedure: REPAIR, HERNIA, INCISIONAL, LAPAROSCOPIC;  Surgeon: Adal Pineda Jr., MD;  Location: [facility] OR 2ND FLR;  Service: General;;    POLYPECTOMY  10/30/2024    Procedure: POLYPECTOMY;  Surgeon: Juan Wood MD;  Location: [facility] OR;  Service: OB/GYN;;     Family History   Problem Relation Name Age of Onset    Hypertension Mother      Hypertension Father      Obesity Father       Social History[1]      Physical Exam     Initial Vitals [04/07/25 0059]   BP Pulse Resp Temp SpO2   (!) 107/58 109 (!) 22 98.2 °F (36.8 °C) 97 %      MAP       --         Physical Exam    Nursing note and vitals reviewed.  Constitutional: She appears well-nourished.   Appears very uncomfortable   HENT:   Head: Normocephalic and atraumatic.   Eyes: EOM are normal.   Neck: Neck supple.   Cardiovascular:  Regular rhythm.           Tachycardic   Pulmonary/Chest: No respiratory distress.   Abdominal: There is abdominal tenderness.   Musculoskeletal:         General: Normal range of motion.      Cervical back: Neck supple.     Neurological: She is alert and oriented to person, place, and time. GCS score is 15. GCS eye subscore is 4. GCS verbal subscore is 5. GCS motor subscore is 6.    Skin: Skin is warm.   Psychiatric: She has a normal mood and affect. Her behavior is normal. Judgment and thought content normal.         ED Course   Procedures  Labs Reviewed   COMPREHENSIVE METABOLIC PANEL - Abnormal       Result Value    Sodium 136      Potassium 4.1      Chloride 108      CO2 18 (*)     Glucose 123 (*)     BUN 21 (*)     Creatinine 0.7      Calcium 8.6 (*)     Protein Total 8.3      Albumin 4.5      Bilirubin Total 1.0      ALP 62      AST 67 (*)     ALT 57 (*)     Anion Gap 10      eGFR >60     URINALYSIS, REFLEX TO URINE CULTURE - Abnormal    Color, UA Yellow      Appearance, UA Clear      pH, UA 6.0      Spec Grav UA 1.030      Protein, UA Negative      Glucose, UA Negative      Ketones, UA Trace (*)     Bilirubin, UA Negative      Blood, UA Negative      Nitrites, UA Negative      Urobilinogen, UA Negative      Leukocyte Esterase, UA Negative     CBC WITH DIFFERENTIAL - Abnormal    WBC 13.19 (*)     RBC 4.59      HGB 15.1      HCT 46.0       (*)     MCH 32.9 (*)     MCHC 32.8      RDW 12.9      Platelet Count 274      MPV 9.6      Nucleated RBC 0      Neut % 91.7 (*)     Lymph % 2.0 (*)     Mono % 4.6      Eos % 0.8      Basophil % 0.5      Imm Grans % 0.4      Neut # 12.11 (*)     Lymph # 0.26 (*)     Mono # 0.61      Eos # 0.10      Baso # 0.06      Imm Grans # 0.05 (*)    HEPATITIS C ANTIBODY - Normal    Hep C Ab Interp Non-Reactive     HIV 1 / 2 ANTIBODY - Normal    HIV 1/2 Ag/Ab Non-Reactive     LIPASE - Normal    Lipase Level 18     LACTIC ACID, PLASMA - Normal    Lactic Acid Level 0.9      Narrative:     Falsely low lactic acid results can be found in samples containing >=13.0 mg/dL total bilirubin and/or >=3.5 mg/dL direct bilirubin.    CBC W/ AUTO DIFFERENTIAL    Narrative:     The following orders were created for panel order CBC auto differential.  Procedure                               Abnormality         Status                     ---------                                -----------         ------                     CBC with Differential[[id number]]       Abnormal            Final result                 Please view results for these tests on the individual orders.   POCT URINE PREGNANCY    POC Preg Test, Ur Negative       Acceptable Yes       EKG Readings: (Independently Interpreted)   Initial Reading: No STEMI.   EKG done at 1:05 a.m. sinus tachycardia rate of 107 normal axis     ECG Results              EKG 12-lead (Final result)        Collection Time Result Time QRS Duration OHS QTC Calculation    04/07/25 01:05:20 04/07/25 10:09:02 58 408                     Final result by Interface, Lab In [facility] (04/07/25 10:09:07)                   Narrative:    Test Reason : R10.9,    Vent. Rate : 107 BPM     Atrial Rate : 107 BPM     P-R Int : 120 ms          QRS Dur :  58 ms      QT Int : 306 ms       P-R-T Axes :  41  86  58 degrees    QTcB Int : 408 ms    Sinus tachycardia  Low voltage QRS  Low septal forces  Abnormal ECG  When compared with ECG of 28-Aug-2024 15:46,  No significant change was found  Confirmed by Aiden Talavera ([id number]) on 4/7/2025 10:09:00 AM    Referred By: AAAREFERRAL SELF           Confirmed By: Aiden Talavera                                  Imaging Results               CT Abdomen Pelvis With IV Contrast NO Oral Contrast (Final result)  Result time 04/07/25 03:42:16      Final result by Francisco Turner MD (04/07/25 03:42:16)                   Impression:      Abdomen CT and CT:    Imaging findings compatible with acute uncomplicated retrocecal/retrocolic appendicitis.  Correlate clinically.    No associated abscess or extraluminal gas.    Other intestinal findings are compatible with a diarrheal illness, possibly with superimposed right lower quadrant ileus secondary to appendicitis.    Status post cholecystectomy.  Mild biliary ductal dilatation, most likely representing post cholecystectomy reservoir effect in the setting of normal LFTs.    Prior  Yogesh-en-Y gastric bypass with suspected gastritis involving both the gastric pouch and the excluded portion of the stomach.  The differential diagnosis for the wall thickening seen in the gastric pouch may also include manifestations of an otherwise CT-occult marginal ulcer of the gastrojejunostomy.  Correlate clinically.    Intra vaginal foreign body, probably a tampon.    IUD projects in the region of the fundal endometrium.    Target like enhancement pattern of the uterine cervix; in some patients, this appearance may be associated with cervicitis.  Correlate clinically.    Additional findings as described in the body of the report.    This report was flagged in Epic as abnormal.    Electronically signed by resident: Davin Ley  Date:    04/07/2025  Time:    02:22    Electronically signed by: Francisco Turner  Date:    04/07/2025  Time:    03:42               Narrative:    EXAMINATION:  CT ABDOMEN PELVIS WITH IV CONTRAST    CLINICAL HISTORY:  Epigastric pain additional history, from the current ED provider note: Multiple prior abdominal surgeries, now presenting with nausea, vomiting, diarrhea, epigastric pain, and headache    TECHNIQUE:  Axial images of the abdomen and pelvis were acquired after the use of 75 mL [id number] IV contrast. No oral contrast was administered.  Coronal and sagittal reconstructions were also obtained    COMPARISON:  CT abdomen pelvis 10/06/2023 and 09/23/2023    FINDINGS:  Heart: Normal in size. No pericardial effusion. No significant calcific coronary atherosclerosis.    Lungs: Minimal subsegmental atelectasis.  No focal consolidation, pleural effusion or pneumothorax.    Hepatobiliary: Liver is normal size.  Normal contour.  Stable 1.0 cm in the right hepatic lobe.  Few additional unchanged scattered subcentimeter hypodensities, too small to adequately characterize.  Gallbladder is surgically absent.  Common bile duct is measures 1.1 cm.  Mild intrahepatic biliary ductal dilatation,  similar to priors.    Spleen: Normal size.    Pancreas: No mass or peripancreatic fat stranding.    Adrenals: Unremarkable.    Kidneys/Ureters: Normal in size, location and enhancement. No hydronephrosis or nephrolithiasis. The ureters are difficult to trace along their entire course.  No proximal ureteral dilatation.    Bladder: Decompressed and not well evaluated.    Reproductive organs: IUD present within the endometrial canal.  On axial images, the region of the uterine cervix demonstrates a target like enhancement pattern.  Although this enhancement pattern can be physiologic, it may also be associated with cervicitis.    Gas within the vaginal lumen.  There is also an intra vaginal oblong radiolucent foreign body, presumably a tampon, that is located within the upper vagina and transversely oriented.    Peritoneum: No free air or free fluid.    Retroperitoneum: No pathologically enlarged abdominopelvic lymph nodes.    Bowel/Mesentery: Postsurgical change of gastric bypass.  The gastric pouch is better distended than on 10/06/2023, but nevertheless demonstrates some mural thickening and hyperenhancement, suspicious for gastric pouch gastritis.    At and near the gastrojejunostomy, the Yogesh loop is poorly distended, making it difficult to confirm or exclude some wall thickening.    The excluded portion of the stomach is again moderately distended by intraluminal fluid and a small amount of intraluminal gas, with some mild mucosal hyperenhancement.  These findings raises suspicion for gastritis of the excluded portion the stomach, possibly on the basis of enterogastric reflux.    Bowel is diffusely fluid-filled, with lack of formed stool throughout the entire length of the colon. Few upper limits of normal to mildly dilated small bowel loops in the right lower quadrant without focal transition point to decompressed small bowel.    The retrocecal/retrocolic appendix is dilated measuring up to 1.0 cm, with some  minimal periappendiceal stranding (2-99 and 601-81).  Portions of the appendix also demonstrate mural thickening and hyperenhancement.    Abdominal wall:  Bilateral breast implants, partially imaged.    Vasculature: No aneurysm. No significant calcific atherosclerosis.  Major abdominal aortic branch vessels are patent. Portal vasculature is patent.    Bones: Degenerative changes of the spine.  No acute fracture or suspicious osseous lesions.                        Preliminary result by Davin Ley MD (04/07/25 02:36:22)                   Impression:      Acute uncomplicated appendicitis.  No adjacent abscess or evidence of perforation.    Liquid stool throughout the colon and few upper limits of normal to mildly dilated right lower quadrant small bowel loops.  No focal transition point to suggest high-grade mechanical obstruction.  Findings suggestive of diarrheal illness, possibly with superimposed right lower quadrant ileus secondary to appendicitis.    Status post cholecystectomy.  Mild biliary ductal dilatation, most likely representing post cholecystectomy reservoir effect in the setting of normal LFTs.    Additional findings as described in the body of the report.    This report was flagged in Epic as abnormal.    Electronically signed by resident: Davin Ley  Date:    04/07/2025  Time:    02:22                 Narrative:    EXAMINATION:  CT ABDOMEN PELVIS WITH IV CONTRAST    CLINICAL HISTORY:  Epigastric pain;    TECHNIQUE:  Axial images of the abdomen and pelvis were acquired after the use of 75 cc [id number] IV contrast. No oral contrast was administered.  Coronal and sagittal reconstructions were also obtained    COMPARISON:  CT abdomen pelvis 10/06/2023 and 09/23/2023    FINDINGS:  Heart: Normal in size. No pericardial effusion. No significant calcific coronary atherosclerosis.    Lungs: Minimal subsegmental atelectasis.  No focal consolidation, pleural effusion or pneumothorax.    Hepatobiliary:  Liver is normal size.  Normal contour.  Stable 1.0 cm in the right hepatic lobe.  Few additional unchanged scattered subcentimeter hypodensities, too small to adequately characterize.  Gallbladder is surgically absent.  Common bile duct is measures 1.1 cm.  Mild intrahepatic biliary ductal dilatation, similar to priors.    Spleen: Normal size.    Pancreas: No mass or peripancreatic fat stranding.    Adrenals: Unremarkable.    Kidneys/Ureters: Normal in size, location and enhancement. No hydronephrosis or nephrolithiasis. The ureters are difficult to trace along their entire course.  No proximal ureteral dilatation.    Bladder: Decompressed and not well evaluated.    Reproductive organs: IUD present within the endometrial canal.  Air within the vaginal lumen, as well as a radiolucent foreign body, presumably tampon.    Peritoneum: No free air or free fluid.    Retroperitoneum: No pathologically enlarged abdominopelvic lymph nodes.    Bowel/Mesentery: Postsurgical change of gastric bypass.  The appendix is dilated measuring up to 1.0 cm with periappendiceal inflammatory change (2-99 and 601-81).  Bowel is diffusely fluid-filled with lack of formed stool throughout the entire length of the colon.  Few upper limits of normal to mildly dilated small bowel loops in the right lower quadrant without focal transition point to decompressed small bowel.    Abdominal wall:  Bilateral breast implants, partially imaged.    Vasculature: No aneurysm. No significant calcific atherosclerosis.  Major abdominal aortic branch vessels are patent. Portal vasculature is patent.    Bones: Degenerative changes of the spine.  No acute fracture or suspicious osseous lesions.                                       Medications   LIDOcaine (PF) 10 mg/ml (1%) injection 10 mg (has no administration in time range)   sodium chloride 0.9% flush 10 mL (has no administration in time range)   ondansetron disintegrating tablet 8 mg (has no administration  in time range)   melatonin tablet 6 mg (has no administration in time range)   lactated ringers infusion ( Intravenous New Bag 4/7/25 0939)   acetaminophen tablet 650 mg (650 mg Oral Given 4/7/25 1119)   piperacillin-tazobactam (ZOSYN) 4.5 g in D5W 100 mL IVPB (MB+) (4.5 g Intravenous New Bag 4/7/25 1215)   morphine injection 4 mg (4 mg Intravenous Given 4/7/25 0121)   ondansetron injection 4 mg (4 mg Intravenous Given 4/7/25 0120)   sodium chloride 0.9% bolus 1,000 mL 1,000 mL (0 mLs Intravenous Stopped 4/7/25 0306)   iohexoL (OMNIPAQUE 350) injection 75 mL (75 mLs Intravenous Given 4/7/25 0212)   morphine injection 4 mg (4 mg Intravenous Given 4/7/25 0232)   morphine injection 4 mg (4 mg Intravenous Given 4/7/25 0306)   diphenhydrAMINE injection 25 mg (25 mg Intravenous Given 4/7/25 0341)   HYDROmorphone (PF) injection 0.5 mg (0.5 mg Intravenous Given 4/7/25 0341)   sodium chloride 0.9% bolus 1,000 mL 1,000 mL (0 mLs Intravenous Stopped 4/7/25 0526)   piperacillin-tazobactam (ZOSYN) 4.5 g in D5W 100 mL IVPB (MB+) (0 g Intravenous Stopped 4/7/25 0526)   HYDROmorphone (PF) injection 0.5 mg (0.5 mg Intravenous Given 4/7/25 0933)   diphenhydrAMINE injection 25 mg (25 mg Intravenous Given 4/7/25 0933)     Medical Decision Making  DDX: internal hernia, hernia, colitis, SBO, retained gallstone, appendicitis, UTI, pyelonephritis    Pt appears very uncomfortable with nausea and pain, zofran and morphine given  Gen surgery aware of patient in the ED pending CT given her complicated surgical history  Care transferred to Dr. Sanchez pending CT results        Amount and/or Complexity of Data Reviewed  Labs: ordered.  Radiology: ordered.  ECG/medicine tests: ordered and independent interpretation performed. Decision-making details documented in ED Course.    Risk  Prescription drug management.                                      Clinical Impression:  Final diagnoses:  [R10.9] Abdominal pain  [K37] Appendicitis          ED  Disposition Condition    Observation                     [1]   Social History  Tobacco Use    Smoking status: Never    Smokeless tobacco: Never   Substance Use Topics    Alcohol use: Yes     Comment: ocassional; wine    Drug use: No        Elissa Motley MD  04/07/25 5722

## 2025-04-07 NOTE — BRIEF OP NOTE
[facility] - [facility]  Brief Operative Note    SUMMARY     Surgery Date: 4/7/2025     Surgeons and Role:     * Nilay Montesinos MD - Primary     * Davy Pruitt MD - Resident - Assisting     * Sana Luna MD - Resident - Assisting     * Brandon Morrison MD        Pre-op Diagnosis:  Abdominal pain [R10.9]    Post-op Diagnosis:  Post-Op Diagnosis Codes:     * Abdominal pain [R10.9]    Procedure(s) (LRB):  APPENDECTOMY, LAPAROSCOPIC ADD ON #5 (N/A)    Anesthesia: General    Implants:  * No implants in log *    Operative Findings: Laparoscopic appendectomy performed. Entire small bowel run without evidence of internal hernia    Estimated Blood Loss: * No values recorded between 4/7/2025  3:56 PM and 4/7/2025  5:57 PM *    Estimated Blood Loss has not been documented. EBL = minimal.         Specimens:   Specimen (24h ago, onward)       Start     Ordered    04/07/25 1738  Specimen to Pathology General Surgery  RELEASE UPON ORDERING        References:    Click here for ordering Quick Tip   Question:  Release to patient  Answer:  Immediate    04/07/25 1738                  ID Type Source Tests Collected by Time Destination   1 : 1) Appendix- perm Tissue Appendix SPECIMEN TO PATHOLOGY Nilay Montesinos MD 4/7/2025 1738        [id number]

## 2025-04-07 NOTE — TRANSFER OF CARE
"Anesthesia Transfer of Care Note    Patient: Sarah Padron    Procedure(s) Performed: Procedure(s) (LRB):  APPENDECTOMY, LAPAROSCOPIC ADD ON #5 (N/A)    Patient location: PACU    Anesthesia Type: general    Transport from OR: Transported from OR on 6-10 L/min O2 by face mask with adequate spontaneous ventilation    Post pain: adequate analgesia    Post assessment: no apparent anesthetic complications and tolerated procedure well    Post vital signs: stable    Level of consciousness: awake    Nausea/Vomiting: no nausea/vomiting    Complications: none    Transfer of care protocol was followed    Last vitals: Visit Vitals  /60   Pulse 69   Temp 36.9 °C (98.4 °F) (Oral)   Resp 18   Ht 5' 3" (1.6 m)   Wt 63.5 kg (140 lb)   LMP 03/08/2025   SpO2 98%   Breastfeeding No   BMI 24.80 kg/m²     "

## 2025-04-07 NOTE — ED NOTES
Addended by: DONNY PITTS on: 10/24/2024 10:00 AM     Modules accepted: Orders     Requested tele box from [facility]

## 2025-04-07 NOTE — NURSING TRANSFER
Nursing Transfer Note      4/7/2025   6:59 PM    Nurse giving handoff: Vega GARCIA PACU  Nurse receiving handoff:Marilyn GARCIA POST OP    Reason patient is being transferred: recovered from anesthesia    Transfer To: [id number]    Transfer via bed    Transfer with IV pump/fluid    Transported by PCT x2    Transfer Vital Signs:  Please see flow sheet    Telemetry: Box Number [id number], Rate 69, Rhythm sinus tech Chasim  Order for Tele Monitor? yes    Additional Lines: none    Medicines sent: none    Any special needs or follow-up needed: routine    Patient belongings transferred with patient: No    Chart send with patient: Yes    Notified: family via surgical texting system     Patient reassessed at: 4/7/2025 at  1930  1  Upon arrival to floor: cardiac monitor applied, patient oriented to room, and bed in lowest position

## 2025-04-07 NOTE — CONSULTS
[facility] Dept  General Surgery  Consult Note    Inpatient consult to General Surgery  Consult performed by: Sofiya Johnson MD  Consult ordered by: Dane Sanchez MD      Subjective:     Chief Complaint/Reason for Admission: appendicitis    History of Present Illness: Patient is a 45y F w/ hx of gastric band, RYGB c/b 4 internal hernia repairs (1 open, 3 laparoscopic) who presents with abdominal pain, nausea, vomiting and diarrhea. She endorses worsening abdominal pain and nausea over the past few days, prompting her to come into the ED as she was concerned she may have an internal hernia again. Has been having regular bowel function and passing gas prior to this. Some diarrhea over the past day. Labs remarkable for WBC of 13, tachycardia. Imaging with dilated appendix at 1 cm and some surrounding inflammatory changes.     No current facility-administered medications on file prior to encounter.     Current Outpatient Medications on File Prior to Encounter   Medication Sig    acyclovir (ZOVIRAX) 400 MG tablet Take 1 tablet (400 mg total) by mouth 2 (two) times daily.    acyclovir 5% (ZOVIRAX) 5 % ointment Apply topically 5 (five) times daily.    atomoxetine (STRATTERA) 18 MG capsule Take 2 capsules (36 mg total) by mouth once daily.    atomoxetine (STRATTERA) 18 MG capsule 2 capsules.    atomoxetine (STRATTERA) 60 MG capsule 1 capsule in the morning Orally Once a day for 90 days    atomoxetine (STRATTERA) 60 MG capsule 1 capsule in the morning Orally Once a day 90 days    baclofen (LIORESAL) 10 MG tablet Take 1 tablet (10 mg total) by mouth 3 (three) times daily as needed (back pain).    buPROPion (WELLBUTRIN XL) 150 MG TB24 tablet Take 1 tablet (150 mg total) by mouth once daily.    semaglutide (OZEMPIC) 0.25 mg or 0.5 mg (2 mg/3 mL) pen injector Inject 0.5 mg into the skin every 7 days. Last dose was 10/16/24    traZODone (DESYREL) 100 MG tablet Take 1 tablet (100 mg total) by mouth every evening.     vitamin D (VITAMIN D3) 1000 units Tab Take 1 tablet (1,000 Units total) by mouth once daily.       Review of patient's allergies indicates:   Allergen Reactions    Dilaudid [hydromorphone] Itching    Meperidine Itching and Nausea And Vomiting     Other reaction(s): Other (See Comments)       Past Medical History:   Diagnosis Date    Migraine without aura 09/19/2024     Past Surgical History:   Procedure Laterality Date    ABDOMINAL SURGERY      AUGMENTATION OF BREAST Bilateral 2009    silicone implants and a lift    BREAST SURGERY      lift    CHOLECYSTECTOMY      DIAGNOSTIC LAPAROSCOPY N/A 05/18/2023    Procedure: LAPAROSCOPY, DIAGNOSTIC;  Surgeon: Davy Barnes MD;  Location: [address];  Service: General;  Laterality: N/A;  REPAIR INTERNAL HERNIA    DIAGNOSTIC LAPAROSCOPY N/A 07/05/2023    Procedure: LAPAROSCOPY, DIAGNOSTIC with revision of JJ anastomosis;  Surgeon: Davy Barnes MD;  Location: [address];  Service: General;  Laterality: N/A;    DIAGNOSTIC LAPAROSCOPY N/A 09/24/2023    Procedure: LAPAROSCOPY, DIAGNOSTIC;  Surgeon: Adal Pineda Jr., MD;  Location: [address];  Service: General;  Laterality: N/A;    EYE SURGERY Right     cataract    GASTRIC BYPASS      HERNIA REPAIR N/A 01/01/2016    internal    HYSTEROSCOPY WITH DILATION AND CURETTAGE OF UTERUS N/A 10/30/2024    Procedure: HYSTEROSCOPY, WITH DILATION AND CURETTAGE OF UTERUS;  Surgeon: Juan Wood MD;  Location: [facility];  Service: OB/GYN;  Laterality: N/A;    INTRAUTERINE DEVICE INSERTION N/A 10/30/2024    Procedure: INSERTION, INTRAUTERINE DEVICE;  Surgeon: Juan Wood MD;  Location: [facility];  Service: OB/GYN;  Laterality: N/A;    lap band removal  07/09/2014    LAPAROSCOPIC GASTRIC BANDING      LAPAROSCOPIC GASTRIC BANDING  01/01/2008    revision    LAPAROSCOPIC REPAIR OF INCISIONAL HERNIA  09/24/2023    Procedure: REPAIR, HERNIA, INCISIONAL, LAPAROSCOPIC;  Surgeon: Adal Pineda  OSMIN Cruz MD;  Location: [address];  Service: General;;    POLYPECTOMY  10/30/2024    Procedure: POLYPECTOMY;  Surgeon: Juan Wood MD;  Location: [facility];  Service: OB/GYN;;     Family History       Problem Relation (Age of Onset)    Hypertension Mother, Father    Obesity Father          Tobacco Use    Smoking status: Never    Smokeless tobacco: Never   Substance and Sexual Activity    Alcohol use: Yes     Comment: ocassional; wine    Drug use: No    Sexual activity: Yes     Partners: Male     Review of Systems   Constitutional:  Negative for fatigue and unexpected weight change.   HENT:  Negative for facial swelling.    Eyes:  Negative for redness.   Respiratory:  Negative for chest tightness and shortness of breath.    Cardiovascular:  Negative for chest pain and leg swelling.   Gastrointestinal:  Positive for abdominal pain. Negative for blood in stool, constipation, diarrhea and vomiting.   Neurological:  Negative for dizziness and headaches.     Objective:     Vital Signs (Most Recent):  Temp: 98.2 °F (36.8 °C) (04/07/25 0059)  Pulse: (!) 114 (04/07/25 0245)  Resp: 18 (04/07/25 0341)  BP: 125/85 (04/07/25 0245)  SpO2: 97 % (04/07/25 0245) Vital Signs (24h Range):  Temp:  [98.2 °F (36.8 °C)] 98.2 °F (36.8 °C)  Pulse:  [109-114] 114  Resp:  [16-22] 18  SpO2:  [97 %] 97 %  BP: (107-125)/(58-85) 125/85     Weight: 68 kg (150 lb)  Body mass index is 26.57 kg/m².      Intake/Output Summary (Last 24 hours) at 4/7/2025 0533  Last data filed at 4/7/2025 0526  Gross per 24 hour   Intake 2100 ml   Output --   Net 2100 ml       Physical Exam  Constitutional:       General: She is not in acute distress.     Appearance: Normal appearance.   HENT:      Head: Normocephalic and atraumatic.      Mouth/Throat:      Mouth: Mucous membranes are moist.   Eyes:      Pupils: Pupils are equal, round, and reactive to light.   Cardiovascular:      Rate and Rhythm: Normal rate.   Pulmonary:      Effort: Pulmonary effort is  normal. No respiratory distress.   Abdominal:      General: Abdomen is flat. There is no distension.      Palpations: Abdomen is soft.      Tenderness: There is abdominal tenderness. There is guarding.      Comments: Tenderness worst at midline, not peritonitic. Voluntary guarding.    Musculoskeletal:         General: Normal range of motion.      Cervical back: Normal range of motion.   Skin:     General: Skin is warm and dry.   Neurological:      General: No focal deficit present.      Mental Status: She is alert and oriented to person, place, and time.   Psychiatric:         Mood and Affect: Mood normal.         Behavior: Behavior normal.         Significant Labs:  All pertinent labs from the last 24 hours have been reviewed.    Significant Diagnostics:  I have reviewed all pertinent imaging results/findings within the past 24 hours.    Assessment/Plan:     Patient is a 45y F w/ extensive surgical history following bariatric surgery who presents with nausea, vomiting, diarrhea and elevated WBC -  found to have inflammatory changes of the appendix.    Will plan to admit for appendectomy  NPO  mIVF  IV abx  PRN multimodal pain  PRN nausea  DVT ppx  Ambulate    Dispo: home after appendectomy likely     Thank you for your consult. I will follow-up with patient. Please contact us if you have any additional questions.    Sofiya Johnson MD  General Surgery  [facility]

## 2025-04-08 VITALS
WEIGHT: 140 LBS | BODY MASS INDEX: 24.8 KG/M2 | HEART RATE: 67 BPM | HEIGHT: 63 IN | DIASTOLIC BLOOD PRESSURE: 58 MMHG | TEMPERATURE: 98 F | SYSTOLIC BLOOD PRESSURE: 99 MMHG | OXYGEN SATURATION: 99 % | RESPIRATION RATE: 18 BRPM

## 2025-04-08 PROBLEM — K37 APPENDICITIS: Status: ACTIVE | Noted: 2025-04-08

## 2025-04-08 LAB
ABSOLUTE EOSINOPHIL (OHS): 0.18 K/UL
ABSOLUTE MONOCYTE (OHS): 0.36 K/UL (ref 0.3–1)
ABSOLUTE NEUTROPHIL COUNT (OHS): 4.86 K/UL (ref 1.8–7.7)
ALBUMIN SERPL BCP-MCNC: 2.8 G/DL (ref 3.5–5.2)
ALP SERPL-CCNC: 53 UNIT/L (ref 40–150)
ALT SERPL W/O P-5'-P-CCNC: 74 UNIT/L (ref 10–44)
ANION GAP (OHS): 8 MMOL/L (ref 8–16)
AST SERPL-CCNC: 56 UNIT/L (ref 11–45)
BASOPHILS # BLD AUTO: 0.02 K/UL
BASOPHILS NFR BLD AUTO: 0.3 %
BILIRUB SERPL-MCNC: 0.6 MG/DL (ref 0.1–1)
BUN SERPL-MCNC: 8 MG/DL (ref 6–20)
CALCIUM SERPL-MCNC: 7 MG/DL (ref 8.7–10.5)
CHLORIDE SERPL-SCNC: 111 MMOL/L (ref 95–110)
CO2 SERPL-SCNC: 19 MMOL/L (ref 23–29)
CREAT SERPL-MCNC: 0.7 MG/DL (ref 0.5–1.4)
ERYTHROCYTE [DISTWIDTH] IN BLOOD BY AUTOMATED COUNT: 12.8 % (ref 11.5–14.5)
GFR SERPLBLD CREATININE-BSD FMLA CKD-EPI: >60 ML/MIN/1.73/M2
GLUCOSE SERPL-MCNC: 112 MG/DL (ref 70–110)
HCT VFR BLD AUTO: 38.6 % (ref 37–48.5)
HGB BLD-MCNC: 12.3 GM/DL (ref 12–16)
IMM GRANULOCYTES # BLD AUTO: 0.04 K/UL (ref 0–0.04)
IMM GRANULOCYTES NFR BLD AUTO: 0.7 % (ref 0–0.5)
LYMPHOCYTES # BLD AUTO: 0.47 K/UL (ref 1–4.8)
MAGNESIUM SERPL-MCNC: 1.6 MG/DL (ref 1.6–2.6)
MCH RBC QN AUTO: 32.3 PG (ref 27–31)
MCHC RBC AUTO-ENTMCNC: 31.9 G/DL (ref 32–36)
MCV RBC AUTO: 101 FL (ref 82–98)
NUCLEATED RBC (/100WBC) (OHS): 0 /100 WBC
PHOSPHATE SERPL-MCNC: 2.8 MG/DL (ref 2.7–4.5)
PLATELET # BLD AUTO: 186 K/UL (ref 150–450)
PMV BLD AUTO: 10.9 FL (ref 9.2–12.9)
POTASSIUM SERPL-SCNC: 3.9 MMOL/L (ref 3.5–5.1)
PROT SERPL-MCNC: 5.2 GM/DL (ref 6–8.4)
RBC # BLD AUTO: 3.81 M/UL (ref 4–5.4)
RELATIVE EOSINOPHIL (OHS): 3 %
RELATIVE LYMPHOCYTE (OHS): 7.9 % (ref 18–48)
RELATIVE MONOCYTE (OHS): 6.1 % (ref 4–15)
RELATIVE NEUTROPHIL (OHS): 82 % (ref 38–73)
SODIUM SERPL-SCNC: 138 MMOL/L (ref 136–145)
WBC # BLD AUTO: 5.93 K/UL (ref 3.9–12.7)

## 2025-04-08 PROCEDURE — 83735 ASSAY OF MAGNESIUM: CPT

## 2025-04-08 PROCEDURE — 25000003 PHARM REV CODE 250

## 2025-04-08 PROCEDURE — 36415 COLL VENOUS BLD VENIPUNCTURE: CPT

## 2025-04-08 PROCEDURE — 63600175 PHARM REV CODE 636 W HCPCS

## 2025-04-08 PROCEDURE — 85025 COMPLETE CBC W/AUTO DIFF WBC: CPT

## 2025-04-08 PROCEDURE — 80053 COMPREHEN METABOLIC PANEL: CPT

## 2025-04-08 PROCEDURE — 84100 ASSAY OF PHOSPHORUS: CPT

## 2025-04-08 PROCEDURE — G0378 HOSPITAL OBSERVATION PER HR: HCPCS

## 2025-04-08 RX ORDER — DIPHENHYDRAMINE HCL 25 MG
25 CAPSULE ORAL EVERY 6 HOURS PRN
COMMUNITY
Start: 2025-04-08

## 2025-04-08 RX ORDER — OXYCODONE HYDROCHLORIDE 5 MG/1
5 TABLET ORAL EVERY 6 HOURS PRN
Qty: 10 TABLET | Refills: 0 | Status: SHIPPED | OUTPATIENT
Start: 2025-04-08

## 2025-04-08 RX ORDER — ACETAMINOPHEN 500 MG
1000 TABLET ORAL EVERY 8 HOURS
Status: DISCONTINUED | OUTPATIENT
Start: 2025-04-08 | End: 2025-04-08 | Stop reason: HOSPADM

## 2025-04-08 RX ORDER — DEXTROMETHORPHAN HYDROBROMIDE, GUAIFENESIN 5; 100 MG/5ML; MG/5ML
650 LIQUID ORAL EVERY 8 HOURS
COMMUNITY
Start: 2025-04-08

## 2025-04-08 RX ADMIN — PIPERACILLIN SODIUM AND TAZOBACTAM SODIUM 4.5 G: 4; .5 INJECTION, POWDER, FOR SOLUTION INTRAVENOUS at 05:04

## 2025-04-08 RX ADMIN — OXYCODONE HYDROCHLORIDE 10 MG: 10 TABLET ORAL at 10:04

## 2025-04-08 RX ADMIN — OXYCODONE HYDROCHLORIDE 10 MG: 10 TABLET ORAL at 06:04

## 2025-04-08 RX ADMIN — ACETAMINOPHEN 650 MG: 325 TABLET ORAL at 05:04

## 2025-04-08 NOTE — PLAN OF CARE
Problem: Adult Inpatient Plan of Care  Goal: Plan of Care Review  Outcome: Progressing  Goal: Patient-Specific Goal (Individualized)  Outcome: Progressing  Goal: Absence of Hospital-Acquired Illness or Injury  Outcome: Progressing  Goal: Optimal Comfort and Wellbeing  Outcome: Progressing  Goal: Readiness for Transition of Care  Outcome: Progressing     Problem: Pain Acute  Goal: Optimal Pain Control and Function  Outcome: Progressing     Problem: Infection  Goal: Absence of Infection Signs and Symptoms  Outcome: Progressing     Problem: Wound  Goal: Optimal Coping  Outcome: Progressing  Goal: Optimal Functional Ability  Outcome: Progressing  Goal: Absence of Infection Signs and Symptoms  Outcome: Progressing  Goal: Improved Oral Intake  Outcome: Progressing  Goal: Optimal Pain Control and Function  Outcome: Progressing  Goal: Skin Health and Integrity  Outcome: Progressing  Goal: Optimal Wound Healing  Outcome: Progressing     Problem: Fall Injury Risk  Goal: Absence of Fall and Fall-Related Injury  Outcome: Progressing

## 2025-04-08 NOTE — DISCHARGE INSTRUCTIONS
WOUND CARE  You have skin glue over your incision(s); this will slowly flake away over the next few weeks.  -- Ok to shower; however, no baths or submerging in water (I.e. swimming, submerging in water) for at least two weeks.  -- Please keep the incision clean with soap and water, pat your incision dry, do not scrub hard over your incisions.    MEDICATIONS AND PAIN CONTROL  -- Please resume all home medications as instructed and take any newly prescribed medications.  -- Most people find that over-the-counter pain medications (Tylenol combined with Ibuprofen) will be sufficient for most pain control.  -- If you're taking prescription narcotics, do not drive or operate heavy machinery. Do not drive if your pain is not controlled enough for you to react quickly safely.  -- Take a stool softener with narcotics medications to prevent constipation.    OTHER INSTRUCTIONS  -- Monitor for temp > 101 F, bleeding, redness, purulent drainage, or any sudden, new extreme pain. If any occur, please call our clinic or go to the emergency department if after normal business hours.  -- You may resume your regular diet as tolerated.   -- No heavy lifting (anything >10 lbs or = to a gallon of milk) or strenuous exercise until cleared by a physician.  -- Follow up with Dr. Montesinos in 2 weeks in clinic for a post-op check. If no appointment is made within the week, please call the clinic to schedule.

## 2025-04-08 NOTE — PLAN OF CARE
04/08/25 1346   Final Note   Assessment Type Final Discharge Note   Anticipated Discharge Disposition Home     Patient discharged Home for self- care on 04/08/2025.     Mary Galeas LCSW  Case Management   [facility]   Ext. [phone]

## 2025-04-08 NOTE — DISCHARGE SUMMARY
[facility]  General Surgery  Discharge Summary      Patient Name: Sarah Padron  MRN: [id number]  Admission Date: 4/7/2025  Hospital Length of Stay: 1 days  Discharge Date and Time: 04/08/2025 8:03 AM  Attending Physician: Nilay Montesinos MD   Discharging Provider: Trina Daigle MD  Primary Care Provider: Dunia Hunter MD    HPI:   Patient is a 45y F w/ hx of gastric band, RYGB c/b 4 internal hernia repairs (1 open, 3 laparoscopic) who presents with abdominal pain, nausea, vomiting and diarrhea. She endorses worsening abdominal pain and nausea over the past few days, prompting her to come into the ED as she was concerned she may have an internal hernia again. Has been having regular bowel function and passing gas prior to this. Some diarrhea over the past day. Labs remarkable for WBC of 13, tachycardia. Imaging with dilated appendix at 1 cm and some surrounding inflammatory changes.     Procedure(s) (LRB):  APPENDECTOMY, LAPAROSCOPIC ADD ON #5 (N/A)      Indwelling Lines/Drains at time of discharge:   Lines/Drains/Airways       None                 Hospital Course:   Sp lap appy on 4/7  Uncomplicated surgery  Tolerating diet, having bowel movements, pain controlled on oral pain meds, ambulating at baseline  Fu in 2 weeks in clinic  Meets all criteria for discharge     Goals of Care Treatment Preferences:  Code Status: Full Code      Consults:   Consults (From admission, onward)          Status Ordering Provider     Inpatient consult to General Surgery  Once        Provider:  (Not yet assigned)    Completed YE HERRON          Physical Exam  Constitutional:       General: She is not in acute distress.     Appearance: Normal appearance.   HENT:      Head: Normocephalic and atraumatic.      Mouth/Throat:      Mouth: Mucous membranes are moist.   Eyes:      Pupils: Pupils are equal, round, and reactive to light.   Cardiovascular:      Rate and Rhythm: Normal rate.   Pulmonary:       Effort: Pulmonary effort is normal. No respiratory distress.   Abdominal:      General: Abdomen is flat. There is no distension.      Palpations: Abdomen is soft.      Tenderness: approp tenderness to palpation w cdi incisions under dermabond  Musculoskeletal:         General: Normal range of motion.      Cervical back: Normal range of motion.   Skin:     General: Skin is warm and dry.   Neurological:      General: No focal deficit present.      Mental Status: She is alert and oriented to person, place, and time.   Psychiatric:         Mood and Affect: Mood normal.         Behavior: Behavior normal.     Significant Diagnostic Studies: Labs: CMP   Recent Labs   Lab 04/07/25  0115 04/08/25 0413    138   K 4.1 3.9    111*   CO2 18* 19*   BUN 21* 8   CREATININE 0.7 0.7   CALCIUM 8.6* 7.0*   ALBUMIN 4.5 2.8*   BILITOT 1.0 0.6   ALKPHOS 62 53   AST 67* 56*   ALT 57* 74*   ANIONGAP 10 8    and CBC   Recent Labs   Lab 04/07/25  0115 04/08/25 0413   WBC 13.19* 5.93   HGB 15.1 12.3   HCT 46.0 38.6    186       Pending Diagnostic Studies:       Procedure Component Value Units Date/Time    Specimen to Pathology General Surgery [[id number]] Collected: 04/07/25 1738    Order Status: Sent Lab Status: In process Updated: 04/08/25 0743    Specimen: Tissue from Appendix           Final Active Diagnoses:    Diagnosis Date Noted POA    PRINCIPAL PROBLEM:  Appendicitis [K37] 04/08/2025 Yes      Problems Resolved During this Admission:      Discharged Condition: good    Disposition: Home or Self Care    Follow Up:   Follow-up Information       Nilay Montesinos MD Follow up in 2 week(s).    Specialty: General Surgery  Contact information:  [facility] [address]  [phone]                           Patient Instructions:      Diet Adult Regular     Lifting restrictions   Order Comments: No lifting greater than 10 pounds for 6 weeks from day of surgery.  No pushing/pulling such as vacuuming or  raking.  No straining, avoid constipation and take stool softeners as described and laxatives as needed.  No driving while on narcotics and until you can react quickly without pain.     No dressing needed   Order Comments: WOUND CARE  You have skin glue over your incision(s)  This will slowly flake away in about 10 days  It is okay to shower starting the day after surgery  Can pat your incision dry  Please do not scrub hard over your incisions  Please do not pick the glue off or it will reopen the wound     Notify your health care provider if you experience any of the following:  temperature >100.4     Notify your health care provider if you experience any of the following:  severe uncontrolled pain     Notify your health care provider if you experience any of the following:  redness, tenderness, or signs of infection (pain, swelling, redness, odor or green/yellow discharge around incision site)     Medications:  Reconciled Home Medications:      Medication List        START taking these medications      acetaminophen 650 MG Tbsr  Commonly known as: TYLENOL  Take 1 tablet (650 mg total) by mouth every 8 (eight) hours.     diphenhydrAMINE 25 mg capsule  Commonly known as: BenadryL  Take 1 capsule (25 mg total) by mouth every 6 (six) hours as needed for Itching.     oxyCODONE 5 MG immediate release tablet  Commonly known as: ROXICODONE  Take 1 tablet (5 mg total) by mouth every 6 (six) hours as needed.            CONTINUE taking these medications      * acyclovir 5% 5 % ointment  Commonly known as: ZOVIRAX  Apply topically 5 (five) times daily.     * acyclovir 400 MG tablet  Commonly known as: ZOVIRAX  Take 1 tablet (400 mg total) by mouth 2 (two) times daily.     * atomoxetine 18 MG capsule  Commonly known as: STRATTERA  Take 2 capsules (36 mg total) by mouth once daily.     * atomoxetine 18 MG capsule  Commonly known as: STRATTERA  2 capsules.     * atomoxetine 60 MG capsule  Commonly known as: STRATTERA  1 capsule  in the morning Orally Once a day for 90 days     * atomoxetine 60 MG capsule  Commonly known as: STRATTERA  1 capsule in the morning Orally Once a day 90 days     baclofen 10 MG tablet  Commonly known as: LIORESAL  Take 1 tablet (10 mg total) by mouth 3 (three) times daily as needed (back pain).     buPROPion 150 MG TB24 tablet  Commonly known as: WELLBUTRIN XL  Take 1 tablet (150 mg total) by mouth once daily.     semaglutide 0.25 mg or 0.5 mg (2 mg/3 mL) pen injector  Commonly known as: OZEMPIC  Inject 0.5 mg into the skin every 7 days. Last dose was 10/16/24     traZODone 100 MG tablet  Commonly known as: DESYREL  Take 1 tablet (100 mg total) by mouth every evening.     vitamin D 1000 units Tab  Commonly known as: VITAMIN D3  Take 1 tablet (1,000 Units total) by mouth once daily.           * This list has 6 medication(s) that are the same as other medications prescribed for you. Read the directions carefully, and ask your doctor or other care provider to review them with you.                Time spent on the discharge of patient: 15 minutes    Trina Daigle MD  General Surgery  [facility] - Surgery

## 2025-04-09 ENCOUNTER — PATIENT OUTREACH (OUTPATIENT)
Dept: ADMINISTRATIVE | Facility: CLINIC | Age: 45
End: 2025-04-09
Payer: COMMERCIAL

## 2025-04-09 LAB
ESTROGEN SERPL-MCNC: NORMAL PG/ML
INSULIN SERPL-ACNC: NORMAL U[IU]/ML
LAB AP CLINICAL INFORMATION: NORMAL
LAB AP GROSS DESCRIPTION: NORMAL
LAB AP PERFORMING LOCATION(S): NORMAL
LAB AP REPORT FOOTNOTES: NORMAL

## 2025-04-09 NOTE — OP NOTE
DATE OF PROCEDURE: 04/07/2025.     PREOPERATIVE DIAGNOSIS: Acute appendicitis.     POSTOPERATIVE DIAGNOSIS: Acute appendicitis.     PROCEDURE PERFORMED: Laparoscopic appendectomy.     ATTENDING SURGEON: Nilay Montesinos MD.     HOUSESTAFF SURGEON: Sana Luna MD (RES).     : Smith Pruitt MD (RES).     ANESTHESIA: General endotracheal.     INDICATIONS: Sarah Padron is a 45 y.o.female who presented to the Emergency Department with lower abdominal pain. The history and exam were consistent with acute appendicitis, which was confirmed by laboratory studies and a CT scan. We recommended laparoscopic appendectomy and the patient agreed to proceed. The patient signed informed consent and expressed understanding of the risks and benefits of surgery.     PROCEDURE IN DETAIL: The patient was taken to the operating room and placed supine. After induction of general endotracheal tube anesthesia, the abdomen was prepped and draped in the standard fashion. Timeout was performed. Abdomen was accessed with a Veress needle in the standard fashion in the LUQ and CO2 insufflation was achieved to a pressure of 15-mm Hg. 5-mm trocar was placed using Optiview technique in the RUQ and the abdomen was inspected. There was no evidence of injury related to entry. Under direct vision, a 5-mm trocar was placed in the lower midline and a 12-mm trocar was placed in the left lower quadrant. The right lower quadrant was inspected.  The appendix was identified and noted to have minimal  inflammatory change without evidence of perforation. Given her history of multiple internal hernias and dilated bowel, the decision was made to run the entire small bowel from terminal ileum to JJ and both the andre and BP limbs were ran proximally. We then turned our attention to the appendix. The appendix was elevated. A mesenteric defect was made at the base of the appendix using the [place] dissector. The appendix was  divided at the base with a tan load Endo-MILY stapler. The appendix was placed into an Endocatch bag, was removed, and sent to Pathology. The staple lines on the mesoappendix and cecum were examined and were well sealed, viable, and without bleeding or leak. Hemostasis was confirmed. The 12-mm port fascia was closed under direct vision using 0 Vicryl and a Davin-Ibrahima. Remaining ports were removed under direct vision and no bleeding was noted. Pneumoperitoneum was evaculated. Skin was closed with subcuticular 4-0 Monocryl and Dermabond was applied. The patient was awakened from anesthesia and transferred to the PACU in good condition. All needle and sponge counts were correct at the conclusion of the case. I was present for the case in its entirety.    ESTIMATED BLOOD LOSS: 10 mL.     FINDINGS: Acute non-perforated appendicitis.    SPECIMEN: Appendix.

## 2025-04-09 NOTE — PROGRESS NOTES
C3 nurse spoke with Sarah Padron  for a TCC post hospital discharge follow up call. The patient has a scheduled HOSFU appointment with Dunia Hunter MD  on 4/11/25 @ 1300.

## 2025-04-09 NOTE — ANESTHESIA POSTPROCEDURE EVALUATION
Anesthesia Post Evaluation    Patient: Sarah Padron    Procedure(s) Performed: Procedure(s) (LRB):  APPENDECTOMY, LAPAROSCOPIC ADD ON #5 (N/A)    Final Anesthesia Type: general      Patient location during evaluation: PACU  Patient participation: Yes- Able to Participate  Level of consciousness: awake and alert and oriented  Post-procedure vital signs: reviewed and stable  Pain management: adequate  Airway patency: patent  MARK mitigation strategies: Intraoperative administration of CPAP, nasopharyngeal airway, or oral appliance during sedation, Multimodal analgesia, Extubation while patient is awake, Verification of full reversal of neuromuscular block and Extubation and recovery carried out in lateral, semiupright, or other nonsupine position  PONV status at discharge: No PONV  Anesthetic complications: no      Cardiovascular status: hemodynamically stable  Respiratory status: unassisted  Hydration status: euvolemic  Follow-up not needed.              Vitals Value Taken Time   BP 99/58 04/08/25 07:58   Temp 36.8 °C (98.3 °F) 04/08/25 07:58   Pulse 67 04/08/25 07:58   Resp 18 04/08/25 10:21   SpO2 99 % 04/08/25 07:58         Event Time   Out of Recovery 18:15:00         Pain/Wesley Score: Pain Rating Prior to Med Admin: 7 (4/8/2025 10:21 AM)  Pain Rating Post Med Admin: 3 (4/8/2025  6:02 AM)

## 2025-04-11 ENCOUNTER — OFFICE VISIT (OUTPATIENT)
Dept: FAMILY MEDICINE | Facility: CLINIC | Age: 45
End: 2025-04-11
Payer: COMMERCIAL

## 2025-04-11 VITALS
DIASTOLIC BLOOD PRESSURE: 72 MMHG | SYSTOLIC BLOOD PRESSURE: 104 MMHG | HEART RATE: 86 BPM | WEIGHT: 151.69 LBS | OXYGEN SATURATION: 98 % | BODY MASS INDEX: 26.88 KG/M2 | HEIGHT: 63 IN

## 2025-04-11 DIAGNOSIS — R10.9 ABDOMINAL PAIN, UNSPECIFIED ABDOMINAL LOCATION: ICD-10-CM

## 2025-04-11 DIAGNOSIS — K37 APPENDICITIS, UNSPECIFIED APPENDICITIS TYPE: Primary | ICD-10-CM

## 2025-04-11 PROCEDURE — 99999 PR PBB SHADOW E&M-EST. PATIENT-LVL IV: CPT | Mod: PBBFAC,,, | Performed by: FAMILY MEDICINE

## 2025-04-11 NOTE — PROGRESS NOTES
Subjective     Patient ID: Sarah Padron is a 45 y.o. female.    Chief Complaint: Hospital Follow Up    HPI  History of Present Illness    HPI:  Sarah presents for a follow-up visit after an emergency appendectomy and intestinal exam performed 3 days ago.   Sarah initially had abdominal pain, nausea, vomiting, and diarrhea starting 4 days prior on April 3rd, leading to an emergency room visit. She had intermittent abdominal discomfort for several days prior, resolving within an hour and allowing sleep. On April 7th, her discomfort escalated over 3 hours with vomiting and diarrhea, prompting hospital visit.    At the hospital, she reported pain level as 7/10. Morphine was ineffective for pain control. During CT, she had severe, uncontrolled pain, which she attributed to stomach sliding above the diaphragm, similar to a 2008 event. Doctors diagnosed appendicitis and administered Dilaudid for pain and antibiotics.    She underwent an appendectomy and thorough intestinal exam due to complex surgical history. Surgeons noted inflamed intestines, potentially on the verge of displacement. Post-surgery, she had significant pain, initially undertreated with only Tylenol, causing distress and inability to sleep. This was later addressed with Percocet.    Currently, 3 days post-operation, she reports feeling bloated with changes in bowel movements. Yesterday's stools were liquid, but today they are more solid. She can eat and keep food down without nausea or vomiting. She mentions increased body awareness, likely due to post-surgical inflammation and laparoscopic procedure effects.    She has not resumed semaglutide since surgery, which she had been taking for weight management. Current glucose levels are in the 150s, which she acknowledges as elevated but acceptable given her post-surgical state.    She denies current nausea, vomiting, or difficulty keeping food down.    MEDICATIONS:  Sarah is on a GLP-1  receptor agonist (likely semaglutide), 10 units (0.25 mg) weekly injection for weight management. She is also taking Percocet (oxycodone/acetaminophen) and Tylenol (acetaminophen) as needed for post-surgical pain. The GLP-1 receptor agonist was discontinued for 10 days prior to surgery.    MEDICAL HISTORY:  Sarah has a history of RYGB prior to 2008. She was diagnosed with appendicitis on April 7th of the current year. Recent CT also mentioned suspected gastritis.    SURGICAL HISTORY:  Sarah underwent an appendectomy on April 7th of the current year for appendicitis. She experienced severe post-operative pain initially undertreated with Tylenol. The appendix was removed, and pathology was unremarkable. Two years ago, she had intestinal surgery where her small intestine had moved up and was pulled back down. Six weeks later, she underwent a repeat intestinal surgery for the same issue. Another six weeks after the second surgery, she had an intussusception repair at the anastomosis site.    TEST RESULTS:  Sarah's A1C is normal. Her recent glucose reading was in the 150s.    ALLERGIES:  Sarah is allergic to Demerol, which causes vomiting. She also experiences itching around adhesions with D  ilaudid.    SOCIAL HISTORY:  Marital status:     ROS:  General: -fever, -chills, -fatigue, -weight gain, -weight loss  Eyes: -vision changes, -redness, -discharge  ENT: -ear pain, -nasal congestion, -sore throat  Cardiovascular: -chest pain, -palpitations, -lower extremity edema  Respiratory: -cough, -shortness of breath  Gastrointestinal: +abdominal pain, -nausea, -vomiting, +diarrhea, -constipation, -blood in stool, +bloating  Genitourinary: -dysuria, -hematuria, -frequency  Musculoskeletal: -joint pain, -muscle pain  Skin: -rash, -lesion  Neurological: -headache, -dizziness, -numbness, -tingling  Psychiatric: -anxiety, -depression, -sleep difficulty       Most recent lab results reviewed with patient.         Objective     Vitals:    04/11/25 1250   BP: 104/72   Pulse: 86        Current Medications[1]     Physical Exam    General: No acute distress. Well-developed. Well-nourished.  Eyes: EOMI. Sclerae anicteric.  HENT: Normocephalic. Atraumatic. Nares patent. Moist oral mucosa.  Ears: Bilateral TMs clear. Bilateral EACs clear.  Cardiovascular: Regular rate. Regular rhythm. No murmurs. No rubs. No gallops. Normal S1, S2.  Respiratory: Normal respiratory effort. Clear to auscultation bilaterally. No rales. No rhonchi. No wheezing.  Abdomen: Soft. Non-tender. Non-distended. Normoactive bowel sounds.  Musculoskeletal: No  obvious deformity.  Extremities: No lower extremity edema.  Neurological: Alert & oriented x3. No slurred speech. Normal gait.  Psychiatric: Normal mood. Normal affect. Good insight. Good judgment.  Skin: Warm. Dry. No rash.  IMAGING:  A CT Abdomen on April 7th revealed appendicitis, suspected gastritis of both gastric pouch and native stomach, possible interstitial changes or diastasis, signs compatible with diarrheal illness, and possible right lower quadrant ileitis secondary to appendicitis.            Assessment and Plan     Appendicitis, unspecified appendicitis type s/p appendectomy  - Recovering well without complications.  Follow up with surgery as scheduled.  Continue to monitor for complications.    Abdominal pain, unspecified abdominal location  - Due to Appendicitis s/p surgery.  Expectant management and follow up with surgery.     Assessment & Plan    K35.80 Unspecified acute appendicitis  G89.18 Other acute postprocedural pain  Z98.84 Bariatric surgery status    IMPRESSION:  - Reviewed recent appendectomy and associated complications, noting procedure involved exam of intestines due to complex surgical history.  - Assessed current recovery status, noting improvement in bowel function with stools becoming more solid.  - Evaluated ongoing symptoms of bloating, attributing to post-op  inflammation and residual effects of laparoscopic procedure and anesthesia effects.  - Discussed expected timeline for diarrhea resolution following appendectomy and intestinal manipulation.    PLAN SUMMARY:  - CT performed, revealing appendicitis and suspected gastritis  - Appendectomy and intestinal exam conducted  - Antibiotics prescribed post-surgery  - Pain medication prescribed: Percocet, morphine, Dilaudid  - Benadryl administered with Dilaudid to manage itching  - GLP-1 agonist (semaglutide) administration paused due to recent surgery  - Demerol avoided due to patient's adverse reaction  - Follow-up appointment scheduled in 5-6 months  - Contact office if issues arise before scheduled follow-up    ACUTE APPENDICITIS:  - Evaluated the patient presenting with abdominal pain, nausea, vomiting, and diarrhea.  - Pain initially resolved within an hour but later intensified to 7/10.  - Performed CT which revealed appendicitis.  - Conducted appendectomy and intestinal exam.  - Reviewed pathology report showing unremarkable findings with fecal material.  - Assessed possible right lower quadrant iliitis secondary to appendicitis based on surgical history and intestinal findings.  - Prescribed antibiotics and pain medication (Percocet, morphine, Dilaudid) post-surgery.    ACUTE POSTPROCEDURAL PAIN:  - Assessed significant post-operative pain, initially uncontrolled with morphine.  - Acknowledged patient's pain level and extensive surgery beyond appendectomy.  - Determined stronger pain medication was required due to the extent of the surgery.  - Prescribed Percocet for pain management.  - Administered Dilaudid with Benadryl in hospital.    GASTRITIS:  - Noted patient's report of feeling bloated and having diarrhea post-surgery.  - Identified suspected gastritis in both the gastric pouch and native stomach on CT.  - Acknowledged post-surgical bloating and gradual improvement in stool consistency.  - Continued monitoring  of symptoms and recovery.    ABNORMAL GLUCOSE:  - Noted patient's glucose levels in the 150s post-surgery.  - Recorded patient's BMI as 26.  - Acknowledged elevated glucose levels are expected post-surgery.  - Discussed use of GLP-1 agonist (semaglutide) for weight management.  - Noted current pause in administration of GLP-1 agonist due to recent surgery.    ADVERSE EFFECT OF ANTIALLERGIC AND ANTIEMETIC DRUGS:  - Noted patient's report of immediate vomiting with Demerol.  - Avoided administration of Demerol due to patient's adverse reaction.    ADVERSE EFFECT OF OPIOIDS:  - Recorded patient's report of itching around adhesions with Dilaudid.  - Administered Benadryl with Dilaudid to manage itching side effect.    B  ARIATRIC SURGERY STATUS:  - Noted patient's history of bariatric surgery, resulting in a small stomach.  - Paused administration of GLP-1 agonist (semaglutide) due to recent surgery.    INTESTINAL BYPASS AND ANASTOMOSIS STATUS:  - Noted patient's history of intestinal bypass surgery and multiple intestinal complications.  - Examined entire intestines during appendectomy, which appeared inflamed and potentially mobile.  - Acknowledged patient's complex surgical history and potential for intestinal complications.  - Ran the intestines during appendectomy to address potential complications.    FOLLOW-UP:  - Follow up in 5-6 months as scheduled.  - Contact the office if any issues arise before the scheduled follow-up visit.        Follow up in about 6 months (around 10/11/2025) for Prediabetes and chronic illnesses with labs week prior to visit.    This note was generated with the assistance of ambient listening technology. Verbal consent was obtained by the patient and accompanying visitor(s) for the recording of patient appointment to facilitate this note. I attest to having reviewed and edited the generated note for accuracy, though some syntax or spelling errors may persist. Please contact the author of  this note for any clarification.         [1]   Current Outpatient Medications   Medication Sig Dispense Refill    acetaminophen (TYLENOL) 650 MG TbSR Take 1 tablet (650 mg total) by mouth every 8 (eight) hours.      acyclovir (ZOVIRAX) 400 MG tablet Take 1 tablet (400 mg total) by mouth 2 (two) times daily. 60 tablet 11    atomoxetine (STRATTERA) 18 MG capsule Take 2 capsules (36 mg total) by mouth once daily. 120 capsule 0    atomoxetine (STRATTERA) 60 MG capsule 1 capsule in the morning Orally Once a day for 90 days      baclofen (LIORESAL) 10 MG tablet Take 1 tablet (10 mg total) by mouth 3 (three) times daily as needed (back pain). 90 tablet 1    buPROPion (WELLBUTRIN XL) 150 MG TB24 tablet Take 1 tablet (150 mg total) by mouth once daily. 90 tablet 3    diphenhydrAMINE (BENADRYL) 25 mg capsule Take 1 capsule (25 mg total) by mouth every 6 (six) hours as needed for Itching.      oxyCODONE (ROXICODONE) 5 MG immediate release tablet Take 1 tablet (5 mg total) by mouth every 6 (six) hours as needed. 10 tablet 0    semaglutide (OZEMPIC) 0.25 mg or 0.5 mg (2 mg/3 mL) pen injector Inject 0.5 mg into the skin every 7 days. Last dose was 10/16/24      traZODone (DESYREL) 100 MG tablet Take 1 tablet (100 mg total) by mouth every evening. 90 tablet 3    vitamin D (VITAMIN D3) 1000 units Tab Take 1 tablet (1,000 Units total) by mouth once daily.       No current facility-administered medications for this visit.

## 2025-04-22 ENCOUNTER — OFFICE VISIT (OUTPATIENT)
Dept: SURGERY | Facility: CLINIC | Age: 45
End: 2025-04-22
Payer: COMMERCIAL

## 2025-04-22 VITALS
HEART RATE: 81 BPM | WEIGHT: 151.13 LBS | SYSTOLIC BLOOD PRESSURE: 103 MMHG | DIASTOLIC BLOOD PRESSURE: 72 MMHG | OXYGEN SATURATION: 99 % | HEIGHT: 63 IN | BODY MASS INDEX: 26.78 KG/M2

## 2025-04-22 DIAGNOSIS — Z90.49 S/P LAPAROSCOPIC APPENDECTOMY: Primary | ICD-10-CM

## 2025-04-22 PROCEDURE — 99999 PR PBB SHADOW E&M-EST. PATIENT-LVL III: CPT | Mod: PBBFAC,,, | Performed by: SURGERY

## 2025-04-22 PROCEDURE — 3078F DIAST BP <80 MM HG: CPT | Mod: CPTII,S$GLB,, | Performed by: SURGERY

## 2025-04-22 PROCEDURE — 1159F MED LIST DOCD IN RCRD: CPT | Mod: CPTII,S$GLB,, | Performed by: SURGERY

## 2025-04-22 PROCEDURE — 99024 POSTOP FOLLOW-UP VISIT: CPT | Mod: S$GLB,,, | Performed by: SURGERY

## 2025-04-22 PROCEDURE — 3074F SYST BP LT 130 MM HG: CPT | Mod: CPTII,S$GLB,, | Performed by: SURGERY

## 2025-04-22 NOTE — PROGRESS NOTES
[facility]  Post Op Visit    SUBJECTIVE:  Sarah Padron is a 45 y.o. y/o female who presents to clinic today for post op follow up after lap appendectomy on 4/7/25. She denies pain, fevers, chills, nausea, vomiting, diarrhea, constipation, or hematochezia. Returning to usual activity level. Tolerating diet with normal appetite and bowel function. Denies redness around or drainage from incisions.    OBJECTIVE:  Vitals:    04/22/25 1524   BP: 103/72   Pulse: 81       General: female in NAD. Not toxic appearing.   HENT: EOM intact.   Pulmonary: No respiratory distress. Effort normal.  Cardiovascular: Regular rate.   Abdomen: soft, non-tender, non-distended  Skin: Incisions are healing well without signs of infection. No erythema, drainage, or increased warmth noted.       PATHOLOGY:   Appendix, appendectomy:   Acute appendicitis    ASSESSMENT/PLAN:    Sarah Padron is a 45 y.o. y/o female who presents to clinic today for f/u s/p lap appendectomy on 4/7/25. Clinically improving and meeting all post op milestones     Patient is advised to avoid heavy lifting or strenuous activity 6 weeks post op   Path reviewed  Follow-up PRN.   Continue any current medications   Call clinic with any questions or concerns  ED precautions given.   All questions answered; patient is comfortable with the above follow-up plan and verbalized understanding.    Patient and POC discussed with general surgery staff, Dr. Montesinos. They are in agreement with current POC.       Eric Renae PA-C  General Surgery   [facility]

## 2025-05-28 DIAGNOSIS — Z12.31 OTHER SCREENING MAMMOGRAM: ICD-10-CM

## 2025-06-16 ENCOUNTER — PATIENT MESSAGE (OUTPATIENT)
Dept: RESEARCH | Facility: HOSPITAL | Age: 45
End: 2025-06-16
Payer: COMMERCIAL

## 2025-06-16 NOTE — PROGRESS NOTES
PATIENT: Sarah Padron  MRN: [id number]  DATE: 6/23/2025    Diagnosis:   1. Iron deficiency anemia due to chronic blood loss    2. History of Yogesh-en-Y gastric bypass    3. Menorrhagia with regular cycle        Chief Complaint: Anemia      Oncologic History:      Oncologic History     Oncologic Treatment     Pathology       Telemedicine visit:  The patient location is: home  The chief complaint leading to consultation is: iron deficiency anemia    Visit type: audiovisual    Face to Face time with patient: 10 minutes  15 minutes of total time spent on the encounter, which includes face to face time and non-face to face time preparing to see the patient (eg, review of tests), Obtaining and/or reviewing separately obtained history, Documenting clinical information in the electronic or other health record, Independently interpreting results (not separately reported) and communicating results to the patient/family/caregiver, or Care coordination (not separately reported).     Each patient to whom he or she provides medical services by telemedicine is:  (1) informed of the relationship between the physician and patient and the respective role of any other health care provider with respect to management of the patient; and (2) notified that he or she may decline to receive medical services by telemedicine and may withdraw from such care at any time.    Notes: see below    Subjective:    History of Present Illness: Ms. Padron is a 45 y.o. female who presented in September 2024 for evaluation and management of iron deficiency anemia. She was referred by nurse practitioner Jeni Villagomez.    - labs since 2016 reveal a mild-to-moderate microcytic anemia  - iron studies (9/9/24) revealed a decreased ferritin/iron/saturated iron with elevated total iron binding capacity.  - she had gastric bypass in 2014.  - she endorses menorrhagia over the past 6 months.    - she received ferric carboxymaltose x 2 doses in October  2024.      Interval history:  - she presents for a follow-up appointment for her iron deficiency anemia.  - today, she is doing great! She denies fatigue, shortness of breath, chest pain, nausea, vomiting, diarrhea, constipation.  - her menorrhagia has improved/lightened since having an IUD.      Past medical, surgical, family, and social histories have been reviewed and updated below.    Past Medical History:   Past Medical History:   Diagnosis Date    Migraine without aura 09/19/2024       Past Surgical History:   Past Surgical History:   Procedure Laterality Date    AUGMENTATION OF BREAST Bilateral 2009    silicone implants and a lift    BREAST SURGERY  09/30/2024    lift    CHOLECYSTECTOMY      DIAGNOSTIC LAPAROSCOPY N/A 05/18/2023    Procedure: LAPAROSCOPY, DIAGNOSTIC;  Surgeon: Davy Barnes MD;  Location: [facility] OR [address] [place] [address];  Service: General;  Laterality: N/A;  REPAIR INTERNAL HERNIA    DIAGNOSTIC LAPAROSCOPY N/A 07/05/2023    Procedure: LAPAROSCOPY, DIAGNOSTIC with revision of JJ anastomosis;  Surgeon: Davy Barnes MD;  Location: [facility] OR [address] [place] [address];  Service: General;  Laterality: N/A;    DIAGNOSTIC LAPAROSCOPY N/A 09/24/2023    Procedure: LAPAROSCOPY, DIAGNOSTIC;  Surgeon: Adal Pineda Jr., MD;  Location: [facility] OR [address] [place] [address];  Service: General;  Laterality: N/A;    EYE SURGERY Right 2008    cataract    GASTRIC BYPASS  2014    HERNIA REPAIR N/A 01/01/2016    internal    HYSTEROSCOPY WITH DILATION AND CURETTAGE OF UTERUS N/A 10/30/2024    Procedure: HYSTEROSCOPY, WITH DILATION AND CURETTAGE OF UTERUS;  Surgeon: Juan Wood MD;  Location: [facility];  Service: OB/GYN;  Laterality: N/A;    INTRAUTERINE DEVICE INSERTION N/A 10/30/2024    Procedure: INSERTION, INTRAUTERINE DEVICE;  Surgeon: Juan Wood MD;  Location: [facility] OR;  Service: OB/GYN;  Laterality: N/A;    lap band removal  07/09/2014    LAPAROSCOPIC APPENDECTOMY N/A 04/07/2025    Procedure: APPENDECTOMY, LAPAROSCOPIC  ADD ON #5;  Surgeon: Nilay Montesinos MD;  Location: [facility] OR 2ND FLR;  Service: General;  Laterality: N/A;    LAPAROSCOPIC GASTRIC BANDING  01/01/2008    revision    LAPAROSCOPIC REPAIR OF INCISIONAL HERNIA  09/24/2023    Procedure: REPAIR, HERNIA, INCISIONAL, LAPAROSCOPIC;  Surgeon: Adal Pineda Jr., MD;  Location: [facility] OR 2ND FLR;  Service: General;;    POLYPECTOMY  10/30/2024    Procedure: POLYPECTOMY;  Surgeon: Juan Wood MD;  Location: [facility];  Service: OB/GYN;;       Family History:   Family History   Problem Relation Name Age of Onset    Hypertension Mother      Hypertension Father      Obesity Father         Social History:  reports that she has never smoked. She has never used smokeless tobacco. She reports current alcohol use. She reports that she does not use drugs.    Allergies:  Review of patient's allergies indicates:   Allergen Reactions    Dilaudid [hydromorphone] Itching    Meperidine Itching and Nausea And Vomiting     Other reaction(s): Other (See Comments)       Medications:  Current Outpatient Medications   Medication Sig Dispense Refill    acetaminophen (TYLENOL) 650 MG TbSR Take 1 tablet (650 mg total) by mouth every 8 (eight) hours. (Patient not taking: Reported on 4/22/2025)      acyclovir (ZOVIRAX) 400 MG tablet Take 1 tablet (400 mg total) by mouth 2 (two) times daily. 60 tablet 11    atomoxetine (STRATTERA) 18 MG capsule Take 2 capsules (36 mg total) by mouth once daily. 120 capsule 0    atomoxetine (STRATTERA) 60 MG capsule 1 capsule in the morning Orally Once a day for 90 days      baclofen (LIORESAL) 10 MG tablet Take 1 tablet (10 mg total) by mouth 3 (three) times daily as needed (back pain). 90 tablet 1    buPROPion (WELLBUTRIN XL) 150 MG TB24 tablet Take 1 tablet (150 mg total) by mouth once daily. 90 tablet 3    diphenhydrAMINE (BENADRYL) 25 mg capsule Take 1 capsule (25 mg total) by mouth every 6 (six) hours as needed for Itching.      oxyCODONE (ROXICODONE)  5 MG immediate release tablet Take 1 tablet (5 mg total) by mouth every 6 (six) hours as needed. (Patient not taking: Reported on 4/22/2025) 10 tablet 0    semaglutide (OZEMPIC) 0.25 mg or 0.5 mg (2 mg/3 mL) pen injector Inject 0.5 mg into the skin every 7 days. Last dose was 10/16/24      traZODone (DESYREL) 100 MG tablet Take 1 tablet (100 mg total) by mouth every evening. 90 tablet 3    vitamin D (VITAMIN D3) 1000 units Tab Take 1 tablet (1,000 Units total) by mouth once daily.       No current facility-administered medications for this visit.       Review of Systems   Constitutional:  Negative for fatigue.   HENT:  Negative for sore throat.    Eyes:  Negative for visual disturbance.   Respiratory:  Negative for cough and shortness of breath.    Cardiovascular:  Negative for chest pain.   Gastrointestinal:  Negative for abdominal pain, constipation, diarrhea, nausea and vomiting.   Genitourinary:  Negative for dysuria.   Musculoskeletal:  Negative for back pain.   Skin:  Negative for rash.   Neurological:  Negative for headaches.   Hematological:  Negative for adenopathy.   Psychiatric/Behavioral:  The patient is not nervous/anxious.        ECOG Performance Status:   ECOG SCORE    0 - Fully active-able to carry on all pre-disease performance without restriction         Objective:      Vitals: There were no vitals filed for this visit.  BMI: There is no height or weight on file to calculate BMI.  Deferred due to telemedicine visit.    Physical Exam  Deferred due to telemedicine visit.    Laboratory Data:  Labs have been reviewed.    Lab Results   Component Value Date    WBC 7.14 06/23/2025    HGB 14.1 06/23/2025    HCT 43.8 06/23/2025     (H) 06/23/2025     06/23/2025     Lab Results   Component Value Date    FERRITIN 47 12/24/2024     Lab Results   Component Value Date    IRON 120 12/24/2024    TRANSFERRIN 239 12/24/2024    TIBC 354 12/24/2024    FESATURATED 34 12/24/2024            Imaging:     Assessment:       1. Iron deficiency anemia due to chronic blood loss    2. History of Yogesh-en-Y gastric bypass    3. Menorrhagia with regular cycle           Plan:     Iron deficiency anemia / menorrhagia / history of gastric bypass  - I have reviewed her chart  - labs since 2016 reveal a mild-to-moderate microcytic anemia  - iron studies (9/9/24) revealed a decreased ferritin/iron/saturated iron with elevated total iron binding capacity.  - she endorses menorrhagia over the past 6 months.  - she had gastric bypass in 2014. So, she may have decreased absorption of iron as well.  - she is taking oral iron.  - she received ferric carboxymaltose x 2 doses in October 2024.  - Labs have been reviewed. Hemoglobin is 14.1 g/dL. No iron studies were drawn.  - I asked about subsequent frequency of labs. She would like to get labs in one year.      - return to clinic in 12 months with repeat labs.      Rishi Mendoza M.D.  Hematology/Oncology  [facility] - [address], [address]  [place] [address]  Phone: [phone]  Fax: [phone]

## 2025-06-23 ENCOUNTER — OFFICE VISIT (OUTPATIENT)
Dept: HEMATOLOGY/ONCOLOGY | Facility: CLINIC | Age: 45
End: 2025-06-23
Payer: COMMERCIAL

## 2025-06-23 ENCOUNTER — LAB VISIT (OUTPATIENT)
Dept: LAB | Facility: HOSPITAL | Age: 45
End: 2025-06-23
Payer: COMMERCIAL

## 2025-06-23 DIAGNOSIS — Z98.84 HISTORY OF ROUX-EN-Y GASTRIC BYPASS: ICD-10-CM

## 2025-06-23 DIAGNOSIS — D50.0 IRON DEFICIENCY ANEMIA DUE TO CHRONIC BLOOD LOSS: ICD-10-CM

## 2025-06-23 DIAGNOSIS — N92.0 MENORRHAGIA WITH REGULAR CYCLE: ICD-10-CM

## 2025-06-23 DIAGNOSIS — D50.0 IRON DEFICIENCY ANEMIA DUE TO CHRONIC BLOOD LOSS: Primary | ICD-10-CM

## 2025-06-23 LAB
ABSOLUTE EOSINOPHIL (OHS): 0.12 K/UL
ABSOLUTE MONOCYTE (OHS): 0.65 K/UL (ref 0.3–1)
ABSOLUTE NEUTROPHIL COUNT (OHS): 4.73 K/UL (ref 1.8–7.7)
BASOPHILS # BLD AUTO: 0.08 K/UL
BASOPHILS NFR BLD AUTO: 1.1 %
ERYTHROCYTE [DISTWIDTH] IN BLOOD BY AUTOMATED COUNT: 12.4 % (ref 11.5–14.5)
HCT VFR BLD AUTO: 43.8 % (ref 37–48.5)
HGB BLD-MCNC: 14.1 GM/DL (ref 12–16)
IMM GRANULOCYTES # BLD AUTO: 0.03 K/UL (ref 0–0.04)
IMM GRANULOCYTES NFR BLD AUTO: 0.4 % (ref 0–0.5)
LYMPHOCYTES # BLD AUTO: 1.53 K/UL (ref 1–4.8)
MCH RBC QN AUTO: 32.3 PG (ref 27–31)
MCHC RBC AUTO-ENTMCNC: 32.2 G/DL (ref 32–36)
MCV RBC AUTO: 101 FL (ref 82–98)
NUCLEATED RBC (/100WBC) (OHS): 0 /100 WBC
PLATELET # BLD AUTO: 290 K/UL (ref 150–450)
PMV BLD AUTO: 10.8 FL (ref 9.2–12.9)
RBC # BLD AUTO: 4.36 M/UL (ref 4–5.4)
RELATIVE EOSINOPHIL (OHS): 1.7 %
RELATIVE LYMPHOCYTE (OHS): 21.4 % (ref 18–48)
RELATIVE MONOCYTE (OHS): 9.1 % (ref 4–15)
RELATIVE NEUTROPHIL (OHS): 66.3 % (ref 38–73)
WBC # BLD AUTO: 7.14 K/UL (ref 3.9–12.7)

## 2025-06-23 PROCEDURE — 1160F RVW MEDS BY RX/DR IN RCRD: CPT | Mod: CPTII,95,, | Performed by: INTERNAL MEDICINE

## 2025-06-23 PROCEDURE — 36415 COLL VENOUS BLD VENIPUNCTURE: CPT

## 2025-06-23 PROCEDURE — 98005 SYNCH AUDIO-VIDEO EST LOW 20: CPT | Mod: 95,,, | Performed by: INTERNAL MEDICINE

## 2025-06-23 PROCEDURE — 1159F MED LIST DOCD IN RCRD: CPT | Mod: CPTII,95,, | Performed by: INTERNAL MEDICINE

## 2025-06-23 PROCEDURE — 85025 COMPLETE CBC W/AUTO DIFF WBC: CPT

## 2025-07-15 ENCOUNTER — PATIENT MESSAGE (OUTPATIENT)
Dept: FAMILY MEDICINE | Facility: CLINIC | Age: 45
End: 2025-07-15
Payer: COMMERCIAL

## 2025-07-30 ENCOUNTER — OFFICE VISIT (OUTPATIENT)
Dept: FAMILY MEDICINE | Facility: CLINIC | Age: 45
End: 2025-07-30
Payer: COMMERCIAL

## 2025-07-30 ENCOUNTER — PATIENT MESSAGE (OUTPATIENT)
Dept: FAMILY MEDICINE | Facility: CLINIC | Age: 45
End: 2025-07-30

## 2025-07-30 VITALS
HEART RATE: 76 BPM | HEIGHT: 63 IN | WEIGHT: 155.19 LBS | BODY MASS INDEX: 27.5 KG/M2 | SYSTOLIC BLOOD PRESSURE: 104 MMHG | OXYGEN SATURATION: 99 % | DIASTOLIC BLOOD PRESSURE: 68 MMHG

## 2025-07-30 DIAGNOSIS — F90.0 ADHD (ATTENTION DEFICIT HYPERACTIVITY DISORDER), INATTENTIVE TYPE: Primary | ICD-10-CM

## 2025-07-30 DIAGNOSIS — F41.9 ANXIETY: ICD-10-CM

## 2025-07-30 DIAGNOSIS — F33.1 MODERATE EPISODE OF RECURRENT MAJOR DEPRESSIVE DISORDER: ICD-10-CM

## 2025-07-30 PROCEDURE — 1159F MED LIST DOCD IN RCRD: CPT | Mod: CPTII,S$GLB,,

## 2025-07-30 PROCEDURE — 1160F RVW MEDS BY RX/DR IN RCRD: CPT | Mod: CPTII,S$GLB,,

## 2025-07-30 PROCEDURE — 3078F DIAST BP <80 MM HG: CPT | Mod: CPTII,S$GLB,,

## 2025-07-30 PROCEDURE — 99999 PR PBB SHADOW E&M-EST. PATIENT-LVL III: CPT | Mod: PBBFAC,,,

## 2025-07-30 PROCEDURE — 99214 OFFICE O/P EST MOD 30 MIN: CPT | Mod: S$GLB,,,

## 2025-07-30 PROCEDURE — 3074F SYST BP LT 130 MM HG: CPT | Mod: CPTII,S$GLB,,

## 2025-07-30 PROCEDURE — 3008F BODY MASS INDEX DOCD: CPT | Mod: CPTII,S$GLB,,

## 2025-07-30 PROCEDURE — G2211 COMPLEX E/M VISIT ADD ON: HCPCS | Mod: S$GLB,,,

## 2025-07-30 NOTE — PROGRESS NOTES
"Subjective     Patient ID: Sarah Padron is a 45 y.o. female.    Chief Complaint: Follow-up      HPI  44 y/o female with Anxiety, ADHD, and MDD   She was started on Strattera a few months ago.   States that initially this was effective, over the past month she states that this is not as effective.  States that people she works with have noticed this     She is not able to focus  She is not able to complete task  She is having trouble finishing conversations  She is having memory issues    No sleep issues. She takes trazodone for this.   Denies CP, palpitations, SOB  Appetite normal to stress eating  She is drinking 3 glasses wine a night now      She takes 60 mg  in AM then 2 -18 mg a few hours later   States that this is not helping  She has been doing this for many months now      Tried concerta, adderall: She cannot remember how well these worked     She is also taking Wellbutrin 150 mg daily     Review of Systems   Constitutional:  Negative for fatigue and fever.   Respiratory:  Negative for cough, shortness of breath and wheezing.    Cardiovascular:  Negative for chest pain, palpitations and leg swelling.   Gastrointestinal:  Negative for diarrhea, nausea and vomiting.   Integumentary:  Negative for rash.   Neurological:  Negative for dizziness, light-headedness and headaches.          Objective     Vitals:    07/30/25 0758   BP: 104/68   Pulse: 76   SpO2: 99%   Weight: 70.4 kg (155 lb 3.3 oz)   Height: 5' 3" (1.6 m)   PainSc: 0-No pain      Physical Exam  Vitals reviewed.   Constitutional:       General: She is not in acute distress.     Appearance: She is not toxic-appearing.   HENT:      Head: Normocephalic and atraumatic.   Eyes:      General:         Right eye: No discharge.         Left eye: No discharge.      Conjunctiva/sclera: Conjunctivae normal.   Cardiovascular:      Rate and Rhythm: Normal rate and regular rhythm.   Pulmonary:      Effort: No respiratory distress.      Breath sounds: " No wheezing.   Musculoskeletal:      Right lower leg: No edema.      Left lower leg: No edema.   Lymphadenopathy:      Cervical: No cervical adenopathy.   Skin:     Findings: No rash.   Neurological:      Mental Status: She is alert and oriented to person, place, and time.              Assessment and Plan     1. ADHD (attention deficit hyperactivity disorder), inattentive type    2. Anxiety    3. Moderate episode of recurrent major depressive disorder      Pt states that her ADHD medication is no longer affective  She is a pt of Dr. Hunter  Medication managed by psychiatry  Told pt that I will discuss this with Dr. Hunter since I do not manage these medications for her .  Also advised to contact psychiatry regarding medications changes     Will message pt after case discussed with her PCP   Follow up with PCP as needed     30 minutes of total time spent on the encounter, which includes face to face time and non-face to face time preparing to see the patient (eg, review of tests), Obtaining and/or reviewing separately obtained history, Documenting clinical information in the electronic or other health record, Independently interpreting results (not separately reported) and communicating results to the patient/family/caregiver, or Care coordination (not separately reported).      Gely Cummings PA-C  Family Practice/Internal Medicine   Office Phone: [phone]

## 2025-08-08 ENCOUNTER — OFFICE VISIT (OUTPATIENT)
Facility: CLINIC | Age: 45
End: 2025-08-08
Payer: COMMERCIAL

## 2025-08-08 VITALS
WEIGHT: 151.25 LBS | SYSTOLIC BLOOD PRESSURE: 104 MMHG | DIASTOLIC BLOOD PRESSURE: 72 MMHG | HEIGHT: 63 IN | HEART RATE: 73 BPM | BODY MASS INDEX: 26.8 KG/M2 | OXYGEN SATURATION: 97 % | RESPIRATION RATE: 18 BRPM | TEMPERATURE: 98 F

## 2025-08-08 DIAGNOSIS — G89.29 CHRONIC HIP PAIN, LEFT: ICD-10-CM

## 2025-08-08 DIAGNOSIS — M54.9 DORSALGIA, UNSPECIFIED: Primary | ICD-10-CM

## 2025-08-08 DIAGNOSIS — M47.816 LUMBAR SPONDYLOSIS: ICD-10-CM

## 2025-08-08 DIAGNOSIS — M25.552 CHRONIC HIP PAIN, LEFT: ICD-10-CM

## 2025-08-08 PROCEDURE — 99999 PR PBB SHADOW E&M-EST. PATIENT-LVL IV: CPT | Mod: PBBFAC,,, | Performed by: STUDENT IN AN ORGANIZED HEALTH CARE EDUCATION/TRAINING PROGRAM

## 2025-08-08 RX ORDER — MELOXICAM 15 MG/1
15 TABLET ORAL DAILY PRN
Qty: 30 TABLET | Refills: 0 | Status: SHIPPED | OUTPATIENT
Start: 2025-08-08 | End: 2025-09-07

## 2025-08-08 NOTE — PROGRESS NOTES
[facility] - established Patient Evaluation    Referred by: Any Squires   Reason for referral: Lumbar radiculopathy, chronic     CC:   Chief Complaint   Patient presents with    Low-back Pain         12/19/2023     8:55 AM 12/19/2023     8:46 AM 6/2/2015     1:21 PM   Last 3 PDI Scores   Pain Disability Index (PDI) 43 43 9   Interval History 8/8/2025:   Sarah Padron is a 45 y.o. female presents to the clinic for follow up.  Patient was last seen 2023.  Since last visit the pain has significantly worsened.     The pain is located in the back area and radiates to the left hip.  Pain occasionally radiates down to the knee, but never past the knee.  The pain is described as aching and sharp.  She also reports muscle spasms.  Taking baclofen which does help.  She previously completed the healthy back program in his compliant with a home exercise regimen.  Did not note much relief with the healthy back program, unfortunately.    At BEST  2/10   At WORST  7/10 on the WORST day.    On average pain is rated as 4/10.   Today the pain is rated as 4/10  Symptoms interfere with daily activity, sleeping, and work.   Exacerbating factors: working all day with little breaks    Mitigating factors rest.      Current pain medications:  Meloxicam, baclofen       Subjective 12/19/23  Sarah Padron is a 43 y.o. female who presents complaining of lower back pain.  Pain onset was gradual.  No history of trauma or injury.  No history of back surgeries.  Pain does not radiate.  Pain is worse with forward flexion and lifting.  She was referred to physical therapy and starts next week on 12/26/2023.  Lumbar MRI was ordered by Neurosurgery, has not yet been completed.  Degenerative changes were noted in the spine on CT abdomen and pelvis from 10/2023.    Location: lower back   Onset: 1.5 years  Current Pain Score: 4/10  Daily Pain of Range: 5-6/10  Quality: Grabbing and Sharp  Radiation:  N/A  Worsened by: extension, flexion, and lifting  Improved by: rest    Patient denies night fever/night sweats, urinary incontinence, bowel incontinence, significant weight loss, significant motor weakness, and loss of sensations.    Previous Interventions:  - None    Previous Therapies:  PT/OT: yes - completed the healthy back program  Chiropractor:   HEP:  Yes  Relevant Surgery: no   Previous Medications:   - NSAIDS:  Meloxicam  - Muscle Relaxants:  baclofen  - TCAs:   - SNRIs:   - Topicals:   - Anticonvulsants:    - Opioids:  Tramadol  - Adjuvants:     Current Pain Medications:   Baclofen PRN  Meloxicam PRN  Tramadol PRN (from her Ob for PCOS)    Review of Systems:  ROS    GENERAL:  No weight loss, malaise or fevers.  HEENT:   No recent changes in vision or hearing  NECK:  No difficulty with swallowing. No stridor.   RESPIRATORY:  Negative for cough, wheezing or shortness of breath, patient denies any recent URI.  CARDIOVASCULAR:  Negative for chest pain, leg swelling or palpitations.  GI:  Negative for abdominal discomfort, blood in stools or black stools or change in bowel habits. + history of gastric bypass surgery.  MUSCULOSKELETAL:  See HPI.  SKIN:  Negative for lesions, rash, and itching.  PSYCH:  No mood disorder or recent psychosocial stressors.    HEMATOLOGY/LYMPHOLOGY:  Negative for prolonged bleeding, bruising easily or swollen nodes.  Patient is not currently taking any anti-coagulants  NEURO:   No history of headaches, syncope, paralysis, seizures or tremors.  All other reviewed and negative other than HPI.    History:  Current medications, allergies, medical history, surgical history,   family history, and social history were reviewed in the chart as marked.    Full Medication List:    Current Outpatient Medications:     acyclovir 5% (ZOVIRAX) 5 % ointment, Apply topically every 3 (three) hours, Disp: 15 g, Rfl: 0    buPROPion (WELLBUTRIN SR) 150 MG TBSR 12 hr tablet, , Disp: , Rfl:     buPROPion  "(WELLBUTRIN XL) 150 MG TB24 tablet, Take 1 tablet (150 mg total) by mouth 2 (two) times a day., Disp: 180 tablet, Rfl: 0    tranexamic acid (LYSTEDA) 650 mg tablet, Take 2 tablets by mouth 3 (three) times daily, Disp: 30 tablet, Rfl: 3    traZODone (DESYREL) 100 MG tablet, Take 100 mg by mouth nightly as needed., Disp: , Rfl:     traZODone (DESYREL) 100 MG tablet, Take 1 tablet (100 mg total) by mouth nightly as needed for insomnia, Disp: 30 tablet, Rfl: 0    valACYclovir (VALTREX) 1000 MG tablet, Take 1,000 mg by mouth 2 (two) times daily., Disp: , Rfl:     valACYclovir (VALTREX) 1000 MG tablet, Take 1 tablet by mouth 2 (two) times daily, Disp: 60 tablet, Rfl: 0    meloxicam (MOBIC) 15 MG tablet, Take 1 tablet (15 mg total) by mouth once daily., Disp: 30 tablet, Rfl: 0     Allergies:  Dilaudid [hydromorphone] and Meperidine     Medical History:   has no past medical history on file.    Surgical History:   has a past surgical history that includes Laparoscopic gastric banding; Laparoscopic gastric banding (2008); Cholecystectomy; Abdominal surgery; Eye surgery (Right); Breast surgery; Gastric bypass; lap band removal (7/9/14); Hernia repair (N/A, 2016); Diagnostic laparoscopy (N/A, 5/18/2023); Diagnostic laparoscopy (N/A, 7/5/2023); Diagnostic laparoscopy (N/A, 9/24/2023); and Laparoscopic repair of incisional hernia (9/24/2023).    Family History:  family history includes Hypertension in her father and mother; Obesity in her father.    Social History:   reports that she has never smoked. She has never used smokeless tobacco. She reports current alcohol use. She reports that she does not use drugs.    Physical Exam:  Vitals:    12/19/23 0845   BP: 92/63   Pulse: 86   Weight: 65.4 kg (144 lb 1.1 oz)   Height: 5' 3" (1.6 m)   PainSc:   4   PainLoc: Back       GENERAL: Well appearing, in no acute distress, alert and oriented x3.  PSYCH:  Mood and affect appropriate.  SKIN: Skin color, texture, turgor normal, no rashes " or lesions.  HEAD/FACE:  Normocephalic, atraumatic. Cranial nerves grossly intact.  NECK: Normal ROM. Supple.   CV: RRR with palpation of the radial artery.  PULM: No evidence of respiratory difficulty, symmetric chest rise.  GI:  Soft and non-distended.  MSK:  Milgram's negative. Decreased range of motion of the lumbar spine with forward flexion secondary to pain.  Straight leg raising is negative to radicular pain. +Tenderness to palpation over the lower midline lumbar spine.  No pain with lumbar facet loading. No pain over the SI joints. Sacral Thrust is negative.  DANILO positive for left hip pain.  FADIR negative. No pain over GTB.   Peripheral joint ROM is full and pain free without obvious instability or laxity in all four extremities. No obvious deformities, edema, or skin discoloration.  No atrophy or tone abnormalities are noted.   NEURO: Bilateral upper and lower extremity coordination and strength is symmetric.  No loss of sensation is noted.  MENTAL STATUS: A x O x 3, good concentration, speech is fluent and goal directed  MOTOR: 5/5 in all muscle groups  GAIT: Normal. Ambulates unassisted.    Imaging:  CT ABDOMEN PELVIS WITH CONTRAST     CLINICAL HISTORY:  Abdominal pain, acute, nonlocalized;  Generalized abdominal pain     TECHNIQUE:  Low dose axial images, sagittal and coronal reformations were obtained from the lung bases to the pubic symphysis following the IV administration of 75 mL of Omnipaque 350 intravenous contrast. Oral contrast was administered.     COMPARISON:  CT abdomen pelvis 09/23/2023     FINDINGS:  Lungs: Stable 0.3 nodule in the right lower lobe.  Stable bandlike scarring versus subsegmental atelectasis of the right lower lobe.  Bibasilar dependent atelectasis     Heart: Normal size. No pericardial effusion.     Liver: Stable cyst within hepatic segment 4A.  Additional subcentimeter hypodensities, too small to characterize.     Gallbladder: Surgically absent.     Bile ducts: Stable  mild intrahepatic ductal dilatation.  Continued prominence of the extrahepatic biliary duct.     Spleen: Normal size.     Pancreas: No mass or ductal dilatation.  No peripancreatic fat stranding.     Adrenals: No significant abnormality     Renal/Ureters: The kidneys enhance symmetrically.  No hydroureteronephrosis. The urinary bladder is distended with smooth wall contours.     Reproductive: Uterus is without significant abnormality. No adnexal mass.     Stomach/Bowel: Postop changes of gastric bypass.  No evidence of obstruction or inflammatory changes.  The appendix is distended without periappendiceal inflammatory changes.     Peritoneum: Improvement previous seen mesenteric edema.  Small volume fluid within the pelvis.  No intraperitoneal free air.     Lymph Nodes: No pathologic ha enlargement in the abdomen or pelvis.     Vasculature: Abdominal aorta tapers normally.  Portal vasculature, SMV, and splenic vein are patent.     Bones: Degenerative changes of the spine.  No acute fractures or osseous destructive lesions.     Soft Tissues: Partially imaged bilateral breast implants.  Mild generalized body wall edema.     Impression:     No evidence for abnormality to explain acute abdominopelvic pain.     Postoperative changes of gastric bypass.  No obstruction.     Postoperative changes of cholecystectomy with stable mild bile duct dilatation.     Electronically signed by resident: Brian Hernandes  Date:                                            10/06/2023    LUMBAR X-RAY  Results for orders placed during the hospital encounter of 12/19/23    X-Ray Lumbar Spine 5 View    Narrative  EXAMINATION:  XR LUMBAR SPINE COMPLETE 5 VIEW    CLINICAL HISTORY:  Other intervertebral disc degeneration, lumbar region    TECHNIQUE:  AP, lateral, spot and bilateral oblique views of the lumbar spine were performed.    COMPARISON:  None    FINDINGS:  Lumbar vertebral body heights and alignment are preserved.  There is L4-5 disc space  narrowing and degenerative endplate sclerosis and early marginal osteophyte formation.  The remainder of the disc spaces are preserved.  There may be minimal lower lumbar facet arthropathy.  Left upper quadrant surgical suture line noted and cholecystectomy clips present.    Impression  L4-5 spondylosis      Electronically signed by: Christopher Zavala Jr  Date:    12/19/2023  Time:    11:47      Labs:  BMP  Lab Results   Component Value Date     09/25/2023    K 3.8 09/25/2023     (H) 09/25/2023    CO2 24 09/25/2023    BUN 3 (L) 09/25/2023    CREATININE 0.6 09/25/2023    CALCIUM 8.2 (L) 09/25/2023    ANIONGAP 6 (L) 09/25/2023    EGFRNORACEVR >60.0 09/25/2023     Lab Results   Component Value Date    ALT 21 09/25/2023    AST 23 09/25/2023    ALKPHOS 51 (L) 09/25/2023    BILITOT 0.4 09/25/2023     Lab Results   Component Value Date    WBC 7.61 09/25/2023    HGB 8.7 (L) 09/25/2023    HCT 29.6 (L) 09/25/2023    MCV 70 (L) 09/25/2023     09/25/2023         Assessment:  Problem List Items Addressed This Visit    None  Visit Diagnoses       DDD (degenerative disc disease), lumbar    -  Primary    Relevant Orders    [facility]    X-Ray Lumbar Spine 5 View    Lumbar radiculopathy, chronic        Dorsalgia        Relevant Orders    X-Ray Lumbar Spine 5 View          12/19/2023 - Sarah Padron is a 43 y.o. female who  has no past medical history on file.  By history and examination this patient has chronic low back pain without radiculopathy.  The underlying cause cause is degenerative disc disease.  Pathology is confirmed by imaging.  We discussed the underlying diagnoses and multiple treatment options including non-opioid medications, interventional procedures, physical therapy, home exercise, and core muscle enhancement.  The risks and benefits of each treatment option were discussed and all questions were answered.      08/08/2025 - patient returns to  clinic to reestablish care with complaints of low back and left hip pain.  Obtain an MRI of the lumbar spine as well as an x-ray of her hip.  May benefit from an epidural steroid injection +/-  left hip injection.  Pending review of imaging studies, may consider orthopedic referral for hip      Treatment Plan:   Procedures:    May benefit from a left hip injection pending review of imaging.  May benefit from a lumbar epidural steroid injection if no improvement with physical therapy.   PT/OT/HEP: I have stressed the importance of physical activity and a home exercise plan to help with pain and improve health.  Continue with home exercise as tolerated.  Previously completed healthy back program.  Medications:    - meloxicam 15 mg q.d. p.r.n.   -  Reviewed and consistent with medication use as prescribed.  Imaging:  Hip x-rays ordered.  Lumbar MRI ordered.  Follow Up:  Virtual visit after imaging studies complete to review results and discuss interventional options.    Melinda Sanchez,    Interventional Pain Medicine / Anesthesiology    Disclaimer: This note was partly generated using dictation software which may occasionally result in transcription errors.

## 2025-08-11 ENCOUNTER — HOSPITAL ENCOUNTER (OUTPATIENT)
Dept: RADIOLOGY | Facility: HOSPITAL | Age: 45
Discharge: HOME | End: 2025-08-11
Attending: STUDENT IN AN ORGANIZED HEALTH CARE EDUCATION/TRAINING PROGRAM
Payer: COMMERCIAL

## 2025-08-11 DIAGNOSIS — M25.552 CHRONIC HIP PAIN, LEFT: ICD-10-CM

## 2025-08-11 DIAGNOSIS — M54.9 DORSALGIA, UNSPECIFIED: ICD-10-CM

## 2025-08-11 DIAGNOSIS — G89.29 CHRONIC HIP PAIN, LEFT: ICD-10-CM

## 2025-08-11 PROCEDURE — 73521 X-RAY EXAM HIPS BI 2 VIEWS: CPT | Mod: 26,,, | Performed by: RADIOLOGY

## 2025-08-11 PROCEDURE — 72148 MRI LUMBAR SPINE W/O DYE: CPT | Mod: 26,,, | Performed by: RADIOLOGY

## 2025-08-11 PROCEDURE — 72148 MRI LUMBAR SPINE W/O DYE: CPT | Mod: TC

## 2025-08-11 PROCEDURE — 73521 X-RAY EXAM HIPS BI 2 VIEWS: CPT | Mod: TC

## 2025-08-15 ENCOUNTER — OFFICE VISIT (OUTPATIENT)
Facility: CLINIC | Age: 45
End: 2025-08-15
Payer: COMMERCIAL

## 2025-08-15 ENCOUNTER — TELEPHONE (OUTPATIENT)
Dept: PAIN MEDICINE | Facility: CLINIC | Age: 45
End: 2025-08-15
Payer: COMMERCIAL

## 2025-08-15 VITALS — HEIGHT: 63 IN | BODY MASS INDEX: 26.79 KG/M2

## 2025-08-15 DIAGNOSIS — M51.362 DEGENERATION OF INTERVERTEBRAL DISC OF LUMBAR REGION WITH DISCOGENIC BACK PAIN AND LOWER EXTREMITY PAIN: ICD-10-CM

## 2025-08-15 DIAGNOSIS — M25.552 CHRONIC HIP PAIN, LEFT: ICD-10-CM

## 2025-08-15 DIAGNOSIS — M54.16 LUMBAR RADICULOPATHY, CHRONIC: Primary | ICD-10-CM

## 2025-08-15 DIAGNOSIS — G89.29 CHRONIC HIP PAIN, LEFT: ICD-10-CM

## 2025-08-26 ENCOUNTER — TELEPHONE (OUTPATIENT)
Dept: PAIN MEDICINE | Facility: CLINIC | Age: 45
End: 2025-08-26
Payer: COMMERCIAL

## (undated) DEVICE — TROCAR ENDOPATH XCEL 5X75MM

## (undated) DEVICE — SUT GUT PL. 4-0 27 FS-2

## (undated) DEVICE — PAD CURAD NONADH 3X4IN

## (undated) DEVICE — ENDOSTITCH INSTRUMENT

## (undated) DEVICE — TRAY MINOR GEN SURG OMC

## (undated) DEVICE — NDL INSUF ULTRA VERESS 120MM

## (undated) DEVICE — SUT SURGIDAC ENDO STITCH2-0

## (undated) DEVICE — CLIP LIGATION MEDIUM CLIPS 1

## (undated) DEVICE — KIT ANTIFOG W/SPONG & FLUID

## (undated) DEVICE — GOWN POLY REINF BRTH SLV XL

## (undated) DEVICE — TUBING HF INSUFFLATION W/ FLTR

## (undated) DEVICE — BLADE SURG CARBON STEEL SZ11

## (undated) DEVICE — DRAPE HALF SURGICAL 40X58IN

## (undated) DEVICE — STAPLE TRI-STAPLE 2.0 CRV TIP

## (undated) DEVICE — SEAL LENS SCOPE MYOSURE

## (undated) DEVICE — TROCAR ENDOPATH XCEL 12MM 10CM

## (undated) DEVICE — SOL POVIDONE PREP IODINE 4 OZ

## (undated) DEVICE — GOWN SURGICAL X-LARGE

## (undated) DEVICE — ENDOSTITCH POLYSORB 2-0 ES-9

## (undated) DEVICE — SEE MEDLINE ITEM 156902

## (undated) DEVICE — APPLICATOR CHLORAPREP ORN 26ML

## (undated) DEVICE — TROCAR ENDOPATH XCEL 12X100MM

## (undated) DEVICE — DRAPE CORETEMP FLD WRM 56X62IN

## (undated) DEVICE — SYR 10CC LUER LOCK

## (undated) DEVICE — SUT ETHILON 3/0 18IN PS-1

## (undated) DEVICE — ELECTRODE MEGADYNE RETURN DUAL

## (undated) DEVICE — ELECTRODE REM PLYHSV RETURN 9

## (undated) DEVICE — SYR 30CC LUER LOCK

## (undated) DEVICE — DRAPE ABDOMINAL TIBURON 14X11

## (undated) DEVICE — CANNULA ENDOPATH XCEL 5X100MM

## (undated) DEVICE — TROCAR ENDOPATH XCEL 5MM 7.5CM

## (undated) DEVICE — SYR ONLY LUER LOCK 20CC

## (undated) DEVICE — Device

## (undated) DEVICE — BAG TISS RETRV MONARCH 10MM

## (undated) DEVICE — TRAY CATH 1-LYR URIMTR 16FR

## (undated) DEVICE — TROCAR ENDOPATH XCEL 5X100MM

## (undated) DEVICE — SET BASIN 48X48IN 6000ML RING

## (undated) DEVICE — KIT PROCEDURE STER INLET CLOSU

## (undated) DEVICE — GLOVE GAMMEX SURG LF PI SZ 7.5

## (undated) DEVICE — SOL POVIDONE SCRUB IODINE 4 OZ

## (undated) DEVICE — STAPLER GIA HANDLE STD

## (undated) DEVICE — NDL HYPO REG 25G X 1 1/2

## (undated) DEVICE — ADHESIVE DERMABOND ADVANCED

## (undated) DEVICE — SOL NACL 0.9% IV INJ 1000ML

## (undated) DEVICE — SOL IRR SOD CHL .9% POUR

## (undated) DEVICE — GLOVE SENSICARE PI GRN 6.5

## (undated) DEVICE — TOWEL OR DISP STRL BLUE 4/PK

## (undated) DEVICE — SOL NACL IRR 3000ML

## (undated) DEVICE — NDL 22GA X1 1/2 REG BEVEL

## (undated) DEVICE — PENCIL ROCKER SWITCH 10FT CORD

## (undated) DEVICE — GLOVE SENSICARE PI SURG 6.5

## (undated) DEVICE — STAPLER ECHELON FLEX 60MM 44CM

## (undated) DEVICE — DRESSING TRANS 2X2 TEGADERM

## (undated) DEVICE — SCISSOR 5MMX35CM DIRECT DRIVE

## (undated) DEVICE — SUT MCRYL PLUS 4-0 PS2 27IN

## (undated) DEVICE — SUT 0 VICRYL / UR6 (J603)

## (undated) DEVICE — SUT PDS II 1 TP-1 VIL

## (undated) DEVICE — TRAY CATH FOL SIL URIMTR 16FR

## (undated) DEVICE — DRAPE INCISE IOBAN 2 23X17IN

## (undated) DEVICE — GOWN SMART IMP BREATHABLE XXLG

## (undated) DEVICE — COVER LIGHT HANDLE 80/CA

## (undated) DEVICE — STAPLER HANDLE XL 26 CM

## (undated) DEVICE — PACK FLUENT DISPOSABLE

## (undated) DEVICE — SUT VICRYL+ 27 UR-6 VIOL

## (undated) DEVICE — SYS SMOKE EVACUATION LAP